# Patient Record
Sex: MALE | Race: ASIAN | NOT HISPANIC OR LATINO | URBAN - METROPOLITAN AREA
[De-identification: names, ages, dates, MRNs, and addresses within clinical notes are randomized per-mention and may not be internally consistent; named-entity substitution may affect disease eponyms.]

---

## 2019-03-08 ENCOUNTER — EMERGENCY (EMERGENCY)
Facility: HOSPITAL | Age: 64
LOS: 1 days | Discharge: HOME | End: 2019-03-08
Attending: EMERGENCY MEDICINE
Payer: COMMERCIAL

## 2019-03-08 VITALS
HEART RATE: 66 BPM | DIASTOLIC BLOOD PRESSURE: 78 MMHG | TEMPERATURE: 98 F | SYSTOLIC BLOOD PRESSURE: 131 MMHG | RESPIRATION RATE: 18 BRPM | OXYGEN SATURATION: 99 %

## 2019-03-08 VITALS
SYSTOLIC BLOOD PRESSURE: 128 MMHG | DIASTOLIC BLOOD PRESSURE: 84 MMHG | HEART RATE: 61 BPM | WEIGHT: 132.06 LBS | RESPIRATION RATE: 20 BRPM | OXYGEN SATURATION: 99 % | HEIGHT: 68 IN | TEMPERATURE: 98 F

## 2019-03-08 PROCEDURE — 99284 EMERGENCY DEPT VISIT MOD MDM: CPT | Mod: 25

## 2019-03-08 PROCEDURE — 99283 EMERGENCY DEPT VISIT LOW MDM: CPT

## 2019-03-08 PROCEDURE — 71046 X-RAY EXAM CHEST 2 VIEWS: CPT | Mod: 26

## 2019-03-08 PROCEDURE — 71046 X-RAY EXAM CHEST 2 VIEWS: CPT

## 2019-03-08 RX ORDER — LIDOCAINE 4 G/100G
1 CREAM TOPICAL ONCE
Qty: 0 | Refills: 0 | Status: COMPLETED | OUTPATIENT
Start: 2019-03-08 | End: 2019-03-08

## 2019-03-08 RX ORDER — ACETAMINOPHEN 500 MG
975 TABLET ORAL ONCE
Qty: 0 | Refills: 0 | Status: COMPLETED | OUTPATIENT
Start: 2019-03-08 | End: 2019-03-08

## 2019-03-08 RX ORDER — IBUPROFEN 200 MG
600 TABLET ORAL ONCE
Qty: 0 | Refills: 0 | Status: COMPLETED | OUTPATIENT
Start: 2019-03-08 | End: 2019-03-08

## 2019-03-08 RX ADMIN — Medication 600 MILLIGRAM(S): at 11:20

## 2019-03-08 RX ADMIN — Medication 600 MILLIGRAM(S): at 11:19

## 2019-03-08 RX ADMIN — LIDOCAINE 1 PATCH: 4 CREAM TOPICAL at 11:19

## 2019-03-08 RX ADMIN — Medication 975 MILLIGRAM(S): at 11:19

## 2019-03-08 RX ADMIN — Medication 975 MILLIGRAM(S): at 11:20

## 2019-03-08 NOTE — ED ADULT NURSE REASSESSMENT NOTE - NS ED NURSE REASSESS COMMENT FT1
Pt received from JAMAL Huddleston in [place]. Pt AOx3 breathing even unlabored spontaneously, NAD ambulating without difficulty.

## 2019-03-08 NOTE — ED PROVIDER NOTE - OBJECTIVE STATEMENT
Seen in FT5, accompanied by son, declined telephone      Attending MD Valencia: 63M with PMH/PSH including HTN presents to the ED with   back pain after falling last night.  Rosemarie was home last night around 8pm.  Rosemarie was walking down steps carrying a pen and paper because was going to record the gas meter.  Rosemarie was wearing slippers, slipped and missed the last three steps.  Rosemarie slipped and fell hitting the R side of his chest on the steps.  Denies preceding dizziness, weakness, sensory changes.  Denies LOC, hitting head.  Denies chest pain, shortness of breath, palpitations. Denies abdominal pain, nausea, vomiting, diarrhea, blood in stools. Denies dysuria, hematuria.  Reports last night took 2 Tylenol, not improved.  On exam, NAD, head NCAT, PERRL, FROM at neck, no tenderness to midline palpation, no stepoffs along length of spine, lungs CTAB with good inspiratory effort, +tenderness to palpation at around T6 extending from R midaxillary line to the lateral aspect of the back, no overlying ecchymosis, no crepitus, no breaks in skin, +S1S2, no m/r/g, abdomen soft with +BS, NT, ND, no CVAT, moving all extremities with 5/5 strength bilateral upper and lower extremities, good and equal  strength bilaterally; A/P: 63M with R back pain s/p fall, will give pain control, CXR to rule out fractures

## 2019-03-08 NOTE — ED PROVIDER NOTE - IMAGING STUDIES ADDITIONAL INFORMATION FREE TEXT
Discussed with first year resident, understand they cannot give prelim read, on my prelim, no acute pathology

## 2019-03-08 NOTE — ED PROVIDER NOTE - NSFOLLOWUPINSTRUCTIONS_ED_ALL_ED_FT
- You were given 975mg of Tylenol and 600mg of Motrin in the ED as well as a lidocaine patch applied.  You may continue to take over the counter pain medication as per package instructions.  Do not take more than 3000mg of Tylenol in a 24hr period.  - Return to your primary care doctor in 1-3 days.  - Return to the ED for new or worsening symptoms.  - Your chest xray was preliminarily negative, if there is a finding when it is read by  an attending radiology physician, they will contact you at the number you provided.

## 2019-03-08 NOTE — ED PROVIDER NOTE - PROGRESS NOTE DETAILS
Attending MD Valencia: Patient re-evaluated and no acute issues at  this time.  Radiology test reviewed with patient and family.  Patient stable for discharge. Follow up instructions given, importance of follow up emphasized, return to ED parameters reviewed and patient verbalized understanding.  All questions answered, all concerns addressed.

## 2024-09-19 ENCOUNTER — INPATIENT (INPATIENT)
Facility: HOSPITAL | Age: 69
LOS: 3 days | Discharge: NURSING, REHAB OR LONG-TERM CARE | DRG: 66 | End: 2024-09-23
Attending: PSYCHIATRY & NEUROLOGY | Admitting: PSYCHIATRY & NEUROLOGY
Payer: MEDICARE

## 2024-09-19 VITALS
DIASTOLIC BLOOD PRESSURE: 80 MMHG | HEART RATE: 57 BPM | WEIGHT: 131.18 LBS | SYSTOLIC BLOOD PRESSURE: 160 MMHG | OXYGEN SATURATION: 97 % | RESPIRATION RATE: 20 BRPM

## 2024-09-19 DIAGNOSIS — I63.9 CEREBRAL INFARCTION, UNSPECIFIED: ICD-10-CM

## 2024-09-19 LAB
ALBUMIN SERPL ELPH-MCNC: 4.3 G/DL — SIGNIFICANT CHANGE UP (ref 3.3–5)
ALP SERPL-CCNC: 95 U/L — SIGNIFICANT CHANGE UP (ref 40–120)
ALT FLD-CCNC: 75 U/L — HIGH (ref 10–45)
ANION GAP SERPL CALC-SCNC: 18 MMOL/L — HIGH (ref 5–17)
APTT BLD: 35.4 SEC — SIGNIFICANT CHANGE UP (ref 24.5–35.6)
AST SERPL-CCNC: 38 U/L — SIGNIFICANT CHANGE UP (ref 10–40)
BASOPHILS # BLD AUTO: 0.04 K/UL — SIGNIFICANT CHANGE UP (ref 0–0.2)
BASOPHILS NFR BLD AUTO: 0.7 % — SIGNIFICANT CHANGE UP (ref 0–2)
BILIRUB SERPL-MCNC: 0.7 MG/DL — SIGNIFICANT CHANGE UP (ref 0.2–1.2)
BUN SERPL-MCNC: 9 MG/DL — SIGNIFICANT CHANGE UP (ref 7–23)
CALCIUM SERPL-MCNC: 8.9 MG/DL — SIGNIFICANT CHANGE UP (ref 8.4–10.5)
CHLORIDE SERPL-SCNC: 104 MMOL/L — SIGNIFICANT CHANGE UP (ref 96–108)
CO2 SERPL-SCNC: 21 MMOL/L — LOW (ref 22–31)
CREAT SERPL-MCNC: 0.87 MG/DL — SIGNIFICANT CHANGE UP (ref 0.5–1.3)
EGFR: 92 ML/MIN/1.73M2 — SIGNIFICANT CHANGE UP
EOSINOPHIL # BLD AUTO: 0.04 K/UL — SIGNIFICANT CHANGE UP (ref 0–0.5)
EOSINOPHIL NFR BLD AUTO: 0.7 % — SIGNIFICANT CHANGE UP (ref 0–6)
GLUCOSE SERPL-MCNC: 127 MG/DL — HIGH (ref 70–99)
HCT VFR BLD CALC: 49 % — SIGNIFICANT CHANGE UP (ref 39–50)
HGB BLD-MCNC: 15.4 G/DL — SIGNIFICANT CHANGE UP (ref 13–17)
IMM GRANULOCYTES NFR BLD AUTO: 0.2 % — SIGNIFICANT CHANGE UP (ref 0–0.9)
INR BLD: 1.04 RATIO — SIGNIFICANT CHANGE UP (ref 0.85–1.18)
LYMPHOCYTES # BLD AUTO: 1.61 K/UL — SIGNIFICANT CHANGE UP (ref 1–3.3)
LYMPHOCYTES # BLD AUTO: 27.3 % — SIGNIFICANT CHANGE UP (ref 13–44)
MCHC RBC-ENTMCNC: 28.3 PG — SIGNIFICANT CHANGE UP (ref 27–34)
MCHC RBC-ENTMCNC: 31.4 GM/DL — LOW (ref 32–36)
MCV RBC AUTO: 89.9 FL — SIGNIFICANT CHANGE UP (ref 80–100)
MONOCYTES # BLD AUTO: 0.33 K/UL — SIGNIFICANT CHANGE UP (ref 0–0.9)
MONOCYTES NFR BLD AUTO: 5.6 % — SIGNIFICANT CHANGE UP (ref 2–14)
NEUTROPHILS # BLD AUTO: 3.86 K/UL — SIGNIFICANT CHANGE UP (ref 1.8–7.4)
NEUTROPHILS NFR BLD AUTO: 65.5 % — SIGNIFICANT CHANGE UP (ref 43–77)
NRBC # BLD: 0 /100 WBCS — SIGNIFICANT CHANGE UP (ref 0–0)
PLATELET # BLD AUTO: 235 K/UL — SIGNIFICANT CHANGE UP (ref 150–400)
POTASSIUM SERPL-MCNC: 3.7 MMOL/L — SIGNIFICANT CHANGE UP (ref 3.5–5.3)
POTASSIUM SERPL-SCNC: 3.7 MMOL/L — SIGNIFICANT CHANGE UP (ref 3.5–5.3)
PROT SERPL-MCNC: 7.8 G/DL — SIGNIFICANT CHANGE UP (ref 6–8.3)
PROTHROM AB SERPL-ACNC: 11.4 SEC — SIGNIFICANT CHANGE UP (ref 9.5–13)
RBC # BLD: 5.45 M/UL — SIGNIFICANT CHANGE UP (ref 4.2–5.8)
RBC # FLD: 13.2 % — SIGNIFICANT CHANGE UP (ref 10.3–14.5)
SODIUM SERPL-SCNC: 143 MMOL/L — SIGNIFICANT CHANGE UP (ref 135–145)
TROPONIN T, HIGH SENSITIVITY RESULT: 6 NG/L — SIGNIFICANT CHANGE UP (ref 0–51)
WBC # BLD: 5.89 K/UL — SIGNIFICANT CHANGE UP (ref 3.8–10.5)
WBC # FLD AUTO: 5.89 K/UL — SIGNIFICANT CHANGE UP (ref 3.8–10.5)

## 2024-09-19 PROCEDURE — 99285 EMERGENCY DEPT VISIT HI MDM: CPT | Mod: GC

## 2024-09-19 PROCEDURE — 70498 CT ANGIOGRAPHY NECK: CPT | Mod: 26,MC

## 2024-09-19 PROCEDURE — 0042T: CPT | Mod: MC

## 2024-09-19 PROCEDURE — 70496 CT ANGIOGRAPHY HEAD: CPT | Mod: 26,MC

## 2024-09-19 PROCEDURE — 93010 ELECTROCARDIOGRAM REPORT: CPT

## 2024-09-19 RX ORDER — LOSARTAN POTASSIUM 100 MG/1
100 TABLET, FILM COATED ORAL DAILY
Refills: 0 | Status: DISCONTINUED | OUTPATIENT
Start: 2024-09-21 | End: 2024-09-23

## 2024-09-19 RX ORDER — ENOXAPARIN SODIUM 150 MG/ML
40 INJECTION SUBCUTANEOUS EVERY 24 HOURS
Refills: 0 | Status: DISCONTINUED | OUTPATIENT
Start: 2024-09-19 | End: 2024-09-23

## 2024-09-19 RX ORDER — ASPIRIN 325 MG
300 TABLET ORAL DAILY
Refills: 0 | Status: DISCONTINUED | OUTPATIENT
Start: 2024-09-19 | End: 2024-09-20

## 2024-09-19 RX ORDER — LOSARTAN POTASSIUM 100 MG/1
100 TABLET, FILM COATED ORAL
Refills: 0 | Status: DISCONTINUED | OUTPATIENT
Start: 2024-09-19 | End: 2024-09-19

## 2024-09-19 RX ORDER — PANTOPRAZOLE SODIUM 40 MG/1
40 TABLET, DELAYED RELEASE ORAL
Refills: 0 | Status: DISCONTINUED | OUTPATIENT
Start: 2024-09-19 | End: 2024-09-23

## 2024-09-19 RX ORDER — ATORVASTATIN CALCIUM 10 MG/1
80 TABLET, FILM COATED ORAL AT BEDTIME
Refills: 0 | Status: DISCONTINUED | OUTPATIENT
Start: 2024-09-19 | End: 2024-09-23

## 2024-09-19 RX ADMIN — ATORVASTATIN CALCIUM 80 MILLIGRAM(S): 10 TABLET, FILM COATED ORAL at 21:38

## 2024-09-19 NOTE — H&P ADULT - HISTORY OF PRESENT ILLNESS
Neurology - Consult Note    Spectra: [phone] ([facility]), [phone] ([facility])    HPI: 70 y/o male R handed with a PMH of HTN, HLD presents with acute onset gait difficulties, slurred speech, diplopia. LKN is 0700 am 9/18. Per patients family he was at his baseline yesterday when he dropped his daughter wo the train station in the morning but then later around noon time when he went to get l;unch with his wife he was experiencing some gait difficulties. Patient also reports intermittent dizziness withot       Review of Systems:  INCOMPLETE   CONSTITUTIONAL: No fevers or chills  EYES AND ENT: No visual changes or no throat pain   NECK: No pain or stiffness  RESPIRATORY: No shortness of breath  CARDIOVASCULAR: No chest pain or palpitations  GASTROINTESTINAL: No nausea or vomiting   GENITOURINARY: No dysuria  NEUROLOGICAL: +As stated in HPI above  SKIN: No itching, burning, rashes, or lesions   All other review of systems is negative unless indicated above.    Allergies:  dust (Sneezing)  No Known Drug Allergies  grass (Sneezing)      PMHx/PSHx/Family Hx: As above, otherwise see below   Hypertriglyceridemia    HTN (hypertension)    Fracture        Social Hx:  Never smoker; no current use of tobacco, alcohol, or illicit drugs  Lives with ***; occupation ***, baseline functional status is ***    Medications:  MEDICATIONS  (STANDING):    MEDICATIONS  (PRN):      Vitals:  T(C): 37.4 (09-19-24 @ 14:55), Max: 37.4 (09-19-24 @ 14:55)  HR: 64 (09-19-24 @ 14:55) (57 - 64)  BP: 163/86 (09-19-24 @ 14:55) (160/80 - 163/86)  RR: 18 (09-19-24 @ 14:55) (18 - 20)  SpO2: 99% (09-19-24 @ 14:55) (97% - 99%)    Physical Examination: INCOMPLETE  General - non-toxic appearing male/female in no acute distress  Cardiovascular - peripheral pulses palpable, no edema  Respiratory - breathing comfortably with no increased work of breathing    Neurologic Exam:  Mental status - Awake, Alert, Oriented to person, place, and time. Speech fluent, repetition and naming intact. Follows simple and complex commands. Attention/concentration, recent and remote memory (including registration 3/3 and recall 3/3), and fund of knowledge intact    Cranial nerves - PERRLA (4mm -> 3mm b/l), VFF, EOMI, face sensation (V1-V3) intact b/l, facial strength intact without asymmetry b/l, hearing intact b/l, palate with symmetric elevation, trapezius OR sternocleidomastiod 5/5 strength b/l, tongue midline on protrusion with full lateral movement    Motor - Normal bulk and tone throughout. No pronator drift.    Strength testing            Deltoid      Biceps      Triceps     Wrist Extension    Wrist Flexion     Interossei         R            5                 5               5                     5                              5                        5                 5  L             5                 5               5                     5                              5                        5                 5              Hip Flexion    Hip Extension    Knee Flexion    Knee Extension    Dorsiflexion    Plantar Flexion  R              5                         5                       5                           5                            5                          5  L              5                         5                        5                           5                            5                          5    Sensation - Light touch/temperature OR pain/vibration intact throughout    DTR's -             Biceps      Triceps     Brachioradialis      Patellar    Ankle    Toes/plantar response  R             2+             2+                  2+                       2+            2+                 Down  L              2+             2+                 2+                        2+           2+                 Down    Coordination - Finger to Nose intact b/l. No tremors appreciated    Gait and station - Normal casual gait. Romberg (-)    Labs:                        15.4   5.89  )-----------( 235      ( 19 Sep 2024 14:39 )             49.0     09-19    143  |  104  |  9   ----------------------------<  127[H]  3.7   |  21[L]  |  0.87    Ca    8.9      19 Sep 2024 14:39    TPro  7.8  /  Alb  4.3  /  TBili  0.7  /  DBili  x   /  AST  38  /  ALT  75[H]  /  AlkPhos  95  09-19    CAPILLARY BLOOD GLUCOSE      POCT Blood Glucose.: 99 mg/dL (19 Sep 2024 14:33)    LIVER FUNCTIONS - ( 19 Sep 2024 14:39 )  Alb: 4.3 g/dL / Pro: 7.8 g/dL / ALK PHOS: 95 U/L / ALT: 75 U/L / AST: 38 U/L / GGT: x             PT/INR - ( 19 Sep 2024 14:39 )   PT: 11.4 sec;   INR: 1.04 ratio         PTT - ( 19 Sep 2024 14:39 )  PTT:35.4 sec  CSF:                  Radiology:     Neurology - Consult Note    Spectra: [phone] ([facility]), [phone] ([facility])    HPI: 68 y/o male R handed with a PMH of HTN, HLD presents with acute onset gait difficulties, slurred speech, diplopia. LKN is 0700 am 9/18. Per patients family he was at his baseline yesterday when he dropped his daughter at the train station in the morning but then later around noon time when he went to get lunch with his wife he was experiencing some gait difficulties. She noticed him limping to the L side. During the day the patient stayed in bed mostly and did not notice gait difficulties getting worse as he was mostly resting or sleeping. At 2 AM 9/19 he was going to the bathroom and was feeling very imbalanced and unable to walk unassisted.  Patient also reports intermittent dizziness where he felt like he was spinning around. He did not have any nausea or vomiting. This morning his daughter noted that he was unable to use his phone with the L hand and was unable to walk unassisted, requiring daughter or EMS to help out. He is not on any AC/AP at home. Patient is able to normally walk by himself does not need a cane or a walker. 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 While in the ED patient initially had some sensory deficit LLE which later improved on subsequent exam. Denies a headache at this time.     Denies a prior history of stroke. Denies dysphagia. States he was a smoker for about 5 years 40 years ago but nothing recently. Does not drink alcohol. Denies illicit drug use.       Review of Systems:   CONSTITUTIONAL: No fevers or chills  EYES AND ENT: No visual changes or no throat pain   NECK: No pain or stiffness  RESPIRATORY: No shortness of breath  CARDIOVASCULAR: No chest pain or palpitations  GASTROINTESTINAL: No nausea or vomiting   GENITOURINARY: No dysuria  NEUROLOGICAL: +As stated in HPI above  SKIN: No itching, burning, rashes, or lesions   All other review of systems is negative unless indicated above.    Allergies:  dust (Sneezing)  No Known Drug Allergies  grass (Sneezing)      PMHx/PSHx/Family Hx: As above, otherwise see below   Hypertriglyceridemia    HTN (hypertension)    Fracture        Social Hx:  Never smoker; no current use of tobacco, alcohol, or illicit drugs      Medications:  MEDICATIONS  (STANDING):    MEDICATIONS  (PRN):

## 2024-09-19 NOTE — ED PROVIDER NOTE - PROGRESS NOTE DETAILS
Van Graham, PGY2    70 yo M w PMHx of HTN HLD not on AC presents as code stroke for L sided facial weakness, slurred speech, L sided arm/leg weakness. LKW 1pm yesterday, neuro resident at bedside evaluating.

## 2024-09-19 NOTE — H&P ADULT - NSHPPHYSICALEXAM_GEN_ALL_CORE
Vitals:  T(C): 37.4 (09-19-24 @ 14:55), Max: 37.4 (09-19-24 @ 14:55)  HR: 64 (09-19-24 @ 14:55) (57 - 64)  BP: 163/86 (09-19-24 @ 14:55) (160/80 - 163/86)  RR: 18 (09-19-24 @ 14:55) (18 - 20)  SpO2: 99% (09-19-24 @ 14:55) (97% - 99%)    Physical Examination:   General - non-toxic appearing male in no acute distress  Cardiovascular - peripheral pulses palpable, no edema  Respiratory - breathing comfortably with no increased work of breathing    Neurologic Exam:  Mental status - Awake, Alert, Oriented to person, place, and time. Speech fluent, repetition and naming intact. Follows simple and complex commands. Attention/concentration, recent and remote memory (including registration 3/3 and recall 3/3), and fund of knowledge intact. Mild to moderate dysarthria.     Cranial nerves - PERRLA (4mm -> 3mm b/l), VFF, EOMI, face sensation (V1-V3) intact b/l,LFacial droop hearing intact b/l, palate with symmetric elevation, tongue midline on protrusion with full lateral movement    Motor - Normal bulk and tone throughout. +drift LUE, LLE    Strength testing            Deltoid      Biceps      Triceps     Wrist Extension    Wrist Flexion     Interossei         R            5                 5               5                     5                              5                        5                 5  L             4                 4               4                     4                              4                        4                 4              Hip Flexion    Hip Extension    Knee Flexion    Knee Extension    Dorsiflexion    Plantar Flexion  R              5                         5                       5                           5                            5                          5  L              4                         4                        5                           5                            5                          5    Sensation - Light touch  intact throughout    DTR's -             Biceps      Triceps     Brachioradialis      Patellar    Ankle    Toes/plantar response  R             2+             2+                  2+                       2+            2+                 Down  L              2+             2+                 2+                        2+           2+                 Down    Coordination - Ataxic on L heel to shin, dystaxic on L FTN however not out of proportion to weakness    Gait and station - Not assessed

## 2024-09-19 NOTE — H&P ADULT - ATTENDING COMMENTS
I reviewed available diagnostic studies, and reviewed images personally. I agree with resident's history, exam, orders placed, and plan of care. Total care time spent, 75 min. This excludes any time spent on separate procedures or teaching. Medical issues needing to be addressed include: Cerebral infarction w/ cytotoxic cerebral edema, HTN, HLD, gait difficulty/imbalance, dysarthria, L hemiparesis, cerebral athero including L vert athero and stenosis. MRI shows L lateral CC infarct and R elba. ESUS, f up echo, check risk factors, will need ILR. Cont aspirin. Service provided on 9/20/2024.

## 2024-09-19 NOTE — H&P ADULT - NSHPLABSRESULTS_GEN_ALL_CORE
Labs:                        15.4   5.89  )-----------( 235      ( 19 Sep 2024 14:39 )             49.0     09-19    143  |  104  |  9   ----------------------------<  127[H]  3.7   |  21[L]  |  0.87    Ca    8.9      19 Sep 2024 14:39    TPro  7.8  /  Alb  4.3  /  TBili  0.7  /  DBili  x   /  AST  38  /  ALT  75[H]  /  AlkPhos  95  09-19    CAPILLARY BLOOD GLUCOSE      POCT Blood Glucose.: 99 mg/dL (19 Sep 2024 14:33)    LIVER FUNCTIONS - ( 19 Sep 2024 14:39 )  Alb: 4.3 g/dL / Pro: 7.8 g/dL / ALK PHOS: 95 U/L / ALT: 75 U/L / AST: 38 U/L / GGT: x             PT/INR - ( 19 Sep 2024 14:39 )   PT: 11.4 sec;   INR: 1.04 ratio         PTT - ( 19 Sep 2024 14:39 )  PTT:35.4 sec      Radiology:    IMPRESSION:    CT brain:  No hydrocephalus, acute intracranial hemorrhage, mass effect, or brain   edema.    CT brain perfusion:  No significant technical limitation.    Cerebral blood flow less than 30% = 0 mL.    Tmax greater than 6 seconds = 18 mL and involves the bilateral posterior   cerebellar hemispheres and posterior vermis.    Mismatch volume: 18 mL  Mismatch ratio: Infinite    CTA brain:  Left vertebral artery demonstrates calcified plaque and moderate short   segment stenosis at its mid segment. Normal flow-related enhancement   proximal and distal to this level.    Left PICA not well visualized.  No vascular aneurysm. No AVM.    Venous system is well opacified, no evidence for venous sinus or cortical   vein thrombosis.    CTA neck:  No flow-limiting stenosis, evidence for arterial dissection, or vascular   aneurysm.

## 2024-09-19 NOTE — ED PROVIDER NOTE - NSICDXPASTMEDICALHX_GEN_ALL_CORE_FT
PAST MEDICAL HISTORY:  Fracture olecrenon process    HTN (hypertension)     Hypertriglyceridemia

## 2024-09-19 NOTE — ED ADULT NURSE REASSESSMENT NOTE - NS ED NURSE REASSESS COMMENT FT1
Report received from JAMLA Ferreira. Pt received A&Ox4, vitals retaken and documented, NIHSS performed and documented, pt family member given MRI safety screening forms to complete . Bed locked and in lowest position, side rails raised, call bell within reach. Currently pending Neuro bed.

## 2024-09-19 NOTE — ED PROVIDER NOTE - PHYSICAL EXAMINATION
Gen: No acute distress  HEENT: Mucous membranes moist, pink conjunctivae, EOMI  CV: RRR, no clubbing/cyanosis/edema  Resp: CTAB  GI: Abdomen soft, NT, ND. Normal BS. No rebound, no guarding  : No CVAT  Neuro: A&O x 3, left facial droop, left upper extremity left lower extremity 4 out of 5 strength.  MSK: No spine or joint tenderness to palpation  Skin: No rashes

## 2024-09-19 NOTE — ED ADULT NURSE NOTE - NSFALLRISKINTERV_ED_ALL_ED

## 2024-09-19 NOTE — ED PROVIDER NOTE - OBJECTIVE STATEMENT
Pt seen immediately on arrival at 14:32 by Dr. Graham in ambulance bay. Pt seen immediately on arrival at 14:32 by Dr. Graham in ambulance bay.    Dr. Crespo: 69-year-old male history of hypertension, hyperlipidemia, possible diabetes, here as a code stroke.  After detailed discussion with daughter who arrived shortly after to the ED patient's left lower extremity weakness started yesterday at 1 PM, patient shurgged it off, continue to worsen, and this morning noticed left upper extremity weakness and left facial droop as well.  Code stroke initiated in triage, neuro at bedside upon arrival.  No fevers, chills, chest pain, shortness breath, nausea, vomiting, abdominal pain, head injury.

## 2024-09-19 NOTE — H&P ADULT - ASSESSMENT
68 y/o male R handed with a PMH of HTN, HLD presents with acute onset gait difficulties, slurred speech, diplopia. LKN is 0700 am 9/18. 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 Diffuse intracranial atherosclerotic disease seen.         LKN: 0700 9/18  PreMRS: 0  NIHSS: 5 (FD, LUE drift, LLE drift, dysarthria, LLE dystaxia)      Not a tenecteplase candidate as patient is outside of the appropriate window  Not a thrombectomy candidate as no LVO is seen        Recommendations:  [] Admit to stroke floor  [] MRI brain wo contrast  [] TTE without bubble study  [] Start ASA 81mg daily  []  Atorvastatin 80mg QHS, (goal LDL<70)  [] DVT prophylaxis with lovenox 40 mg daily  [] Check HbA1C and lipid panel  [] Telemonitoring; Neurochecks and vital signs Q4H  [] Permissive HTN up to 220/120 mmHg for first 24 hours after the symptom onset followed by gradual normotension.   [] BGM goals 140-180  [] PT/OT evaluation  [] NPO until clears dysphagia screen, otherwise swallow evaluation  [] Stroke education provided      Case d/w Stroke fellow Dr Mcneal under the supervision of Dr Jerry   68 y/o male R handed with a PMH of HTN, HLD presents with acute onset gait difficulties, slurred speech, diplopia. LKN is 0700 am 9/18. 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 Diffuse intracranial atherosclerotic disease seen.         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         Recommendations:  [] Admit to stroke floor  [] MRI brain wo contrast  [] TTE without bubble study  [] Start ASA 81mg daily  []  Atorvastatin 80mg QHS, (goal LDL<70)  [] DVT prophylaxis with lovenox 40 mg daily  [] Check HbA1C and lipid panel  [] Telemonitoring; Neurochecks and vital signs Q4H  [] Permissive HTN up to 220/120 mmHg for first 24 hours after the symptom onset followed by gradual normotension.   [] BGM goals 140-180  [] PT/OT evaluation  [] NPO until clears dysphagia screen, otherwise swallow evaluation  [] Stroke education provided      Case d/w Stroke fellow Dr Mcneal under the supervision of Dr Jerry

## 2024-09-19 NOTE — ED PROVIDER NOTE - CLINICAL SUMMARY MEDICAL DECISION MAKING FREE TEXT BOX
Dr. Crespo: Patient presenting with likely subacute CVA, out of the window for tenecteplase, likely just out of the window for thrombectomy, will discuss with neurology.

## 2024-09-19 NOTE — ED PROVIDER NOTE - ATTENDING CONTRIBUTION TO CARE
Dr. Crespo: I have personally performed a face to face bedside history and physical examination of this patient. I have discussed the history, examination, review of systems, assessment and plan of management with the resident. I have reviewed the electronic medical record and amended it to reflect my history, review of systems, physical exam, assessment and plan.    Dr. Crespo: This H&P has been written by myself in its entirety

## 2024-09-19 NOTE — ED ADULT NURSE NOTE - OBJECTIVE STATEMENT
69y M A&O x3 BIBEMS from home, wife and daughter bedside. Presenting to the ER with left sided upper and lower extremity weakness, left sided facial droop, dysarthria and decreased sensation on the left side. Pt left lower extremity weakness began yesterday at 1 PM; pt did not pa weakness any mind and it continued to worsen, and this morning noticed left upper extremity weakness and left facial droop as well. Code stroke initiated on arrival at 1428.

## 2024-09-20 ENCOUNTER — RESULT REVIEW (OUTPATIENT)
Age: 69
End: 2024-09-20

## 2024-09-20 DIAGNOSIS — I63.9 CEREBRAL INFARCTION, UNSPECIFIED: ICD-10-CM

## 2024-09-20 DIAGNOSIS — E78.5 HYPERLIPIDEMIA, UNSPECIFIED: ICD-10-CM

## 2024-09-20 DIAGNOSIS — I10 ESSENTIAL (PRIMARY) HYPERTENSION: ICD-10-CM

## 2024-09-20 LAB
A1C WITH ESTIMATED AVERAGE GLUCOSE RESULT: 5.8 % — HIGH (ref 4–5.6)
ANION GAP SERPL CALC-SCNC: 12 MMOL/L — SIGNIFICANT CHANGE UP (ref 5–17)
BUN SERPL-MCNC: 14 MG/DL — SIGNIFICANT CHANGE UP (ref 7–23)
CALCIUM SERPL-MCNC: 9 MG/DL — SIGNIFICANT CHANGE UP (ref 8.4–10.5)
CHLORIDE SERPL-SCNC: 107 MMOL/L — SIGNIFICANT CHANGE UP (ref 96–108)
CHOLEST SERPL-MCNC: 175 MG/DL — SIGNIFICANT CHANGE UP
CO2 SERPL-SCNC: 23 MMOL/L — SIGNIFICANT CHANGE UP (ref 22–31)
CREAT SERPL-MCNC: 0.96 MG/DL — SIGNIFICANT CHANGE UP (ref 0.5–1.3)
EGFR: 84 ML/MIN/1.73M2 — SIGNIFICANT CHANGE UP
ESTIMATED AVERAGE GLUCOSE: 120 MG/DL — HIGH (ref 68–114)
GLUCOSE SERPL-MCNC: 136 MG/DL — HIGH (ref 70–99)
HCT VFR BLD CALC: 45.4 % — SIGNIFICANT CHANGE UP (ref 39–50)
HDLC SERPL-MCNC: 33 MG/DL — LOW
HGB BLD-MCNC: 14.4 G/DL — SIGNIFICANT CHANGE UP (ref 13–17)
LIPID PNL WITH DIRECT LDL SERPL: 101 MG/DL — HIGH
MCHC RBC-ENTMCNC: 27.3 PG — SIGNIFICANT CHANGE UP (ref 27–34)
MCHC RBC-ENTMCNC: 31.7 GM/DL — LOW (ref 32–36)
MCV RBC AUTO: 86 FL — SIGNIFICANT CHANGE UP (ref 80–100)
NON HDL CHOLESTEROL: 142 MG/DL — HIGH
NRBC # BLD: 0 /100 WBCS — SIGNIFICANT CHANGE UP (ref 0–0)
PLATELET # BLD AUTO: 242 K/UL — SIGNIFICANT CHANGE UP (ref 150–400)
POTASSIUM SERPL-MCNC: 3.6 MMOL/L — SIGNIFICANT CHANGE UP (ref 3.5–5.3)
POTASSIUM SERPL-SCNC: 3.6 MMOL/L — SIGNIFICANT CHANGE UP (ref 3.5–5.3)
RBC # BLD: 5.28 M/UL — SIGNIFICANT CHANGE UP (ref 4.2–5.8)
RBC # FLD: 13.1 % — SIGNIFICANT CHANGE UP (ref 10.3–14.5)
SODIUM SERPL-SCNC: 142 MMOL/L — SIGNIFICANT CHANGE UP (ref 135–145)
TRIGL SERPL-MCNC: 240 MG/DL — HIGH
WBC # BLD: 7.33 K/UL — SIGNIFICANT CHANGE UP (ref 3.8–10.5)
WBC # FLD AUTO: 7.33 K/UL — SIGNIFICANT CHANGE UP (ref 3.8–10.5)

## 2024-09-20 PROCEDURE — 99223 1ST HOSP IP/OBS HIGH 75: CPT

## 2024-09-20 PROCEDURE — 93306 TTE W/DOPPLER COMPLETE: CPT | Mod: 26

## 2024-09-20 PROCEDURE — 99222 1ST HOSP IP/OBS MODERATE 55: CPT

## 2024-09-20 PROCEDURE — 70551 MRI BRAIN STEM W/O DYE: CPT | Mod: 26

## 2024-09-20 RX ORDER — ASPIRIN 325 MG
81 TABLET ORAL DAILY
Refills: 0 | Status: DISCONTINUED | OUTPATIENT
Start: 2024-09-20 | End: 2024-09-23

## 2024-09-20 RX ADMIN — Medication 81 MILLIGRAM(S): at 09:45

## 2024-09-20 RX ADMIN — ATORVASTATIN CALCIUM 80 MILLIGRAM(S): 10 TABLET, FILM COATED ORAL at 21:18

## 2024-09-20 RX ADMIN — PANTOPRAZOLE SODIUM 40 MILLIGRAM(S): 40 TABLET, DELAYED RELEASE ORAL at 05:03

## 2024-09-20 NOTE — SPEECH LANGUAGE PATHOLOGY EVALUATION - H & P REVIEW
68 y/o male R handed with a PMH of HTN, HLD presents with acute onset gait difficulties, slurred speech, diplopia. LKN is 0700 am 9/18. 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 Diffuse intracranial atherosclerotic disease seen./yes

## 2024-09-20 NOTE — OCCUPATIONAL THERAPY INITIAL EVALUATION ADULT - ADDITIONAL COMMENTS
Pt reports he lives with spouse in a Private house, [address] +HR, first floor set up. +stall shower, No DME/No AE Prior to admission pt independent  in all adl's and functional mobility

## 2024-09-20 NOTE — CONSULT NOTE ADULT - PROBLEM SELECTOR RECOMMENDATION 9
Acute/subacute infarction within the right side of the elba   as well as left lateral genu of the corpus callosum with associated   cytotoxic edema and without associated hemorrhage. Suspicious for embolic etiology  given different vascular distributions.  Check TTE   Monitor on Tele  A1c  Thyroid panel   ASA

## 2024-09-20 NOTE — CHART NOTE - NSCHARTNOTEFT_GEN_A_CORE
ASSESSMENT: 70 y/o male R handed with a PMH of HTN, HLD presents with acute onset gait difficulties, slurred speech, diplopia. LKN is 0700 am 9/18. 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 Diffuse intracranial atherosclerotic disease seen. On exam was found to have L ataxic hemiparesis and dysarthria. Exam remains stable.        NEURO: Continue close monitoring for neurologic deterioration, permissive HTN, titrate statin to LDL goal less than 70, MRI Brain w/o, MRA Head w/o and Neck w/contrast. Physical therapy/OT/Speech eval/treatment. MRI brain with R pontine and L callosal infarct. 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 Will need ILR before DC.     ANTITHROMBOTIC THERAPY: Aspirin 81 mg    PULMONARY: CXR clear, protecting airway, saturating well     CARDIOVASCULAR: check TTE, cardiac monitoring. ILR before DC. Can resume home losartan today.     SBP goal: <140/90    GASTROINTESTINAL:  dysphagia screen done, on pureed diet     Diet: Pureed    RENAL: BUN/Cr stable, good urine output      Na Goal: Greater than 135     [place]: No    HEMATOLOGY: H/H stable, Platelets normal      DVT ppx: Heparin s.c [X] LMWH []     ID: afebrile, no leukocytosis     OTHER:  SCDs and Lovenox 40 mg for DVT ppx    DISPOSITION: Rehab or home depending on PT eval once stable and workup is complete      CORE MEASURES:        Admission NIHSS:      TPA: [] YES [X] NO      LDL/HDL: 101     Depression Screen: Negative     Statin Therapy: Atorvastatin 80 mg     Dysphagia Screen: [] PASS [] FAIL     Smoking [] YES [X] NO      Afib [] YES [X] NO     Stroke Education [] YES [] NO

## 2024-09-20 NOTE — SPEECH LANGUAGE PATHOLOGY EVALUATION - SLP CRITERIA FOR SKILLED THERAPEUTIC INTERVENTIONS MET
skilled criteria for intervention met [Negative] : Respiratory [Fever] : no fever [Chills] : no chills

## 2024-09-20 NOTE — CONSULT NOTE ADULT - ASSESSMENT
68 y/o male R handed with a PMH of HTN, HLD presents with acute onset gait difficulties, slurred speech, diplopia. LKN is 0700 am 9/18. 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 Diffuse intracranial atherosclerotic disease seen.         LKN: 0700 9/18  PreMRS: 0  NIHSS: 5 (FD, LUE drift, LLE drift, dysarthria, LLE dystaxia)      Not a tenecteplase candidate as patient is outside of the appropriate window  Not a thrombectomy candidate as no LVO is seen

## 2024-09-20 NOTE — CONSULT NOTE ADULT - SUBJECTIVE AND OBJECTIVE BOX
CHIEF COMPLAINT:    HISTORY OF PRESENT ILLNESS:   68 y/o male R handed with a PMH of HTN, HLD presents with acute onset gait difficulties, slurred speech, diplopia. LKN is 0700 am 9/18. Per patients family he was at his baseline yesterday when he dropped his daughter at the train station in the morning but then later around noon time when he went to get lunch with his wife he was experiencing some gait difficulties. She noticed him limping to the L side. During the day the patient stayed in bed mostly and did not notice gait difficulties getting worse as he was mostly resting or sleeping. At 2 AM 9/19 he was going to the bathroom and was feeling very imbalanced and unable to walk unassisted.  Patient also reports intermittent dizziness where he felt like he was spinning around. He did not have any nausea or vomiting. This morning his daughter noted that he was unable to use his phone with the L hand and was unable to walk unassisted, requiring daughter or EMS to help out. He is not on any AC/AP at home. Patient is able to normally walk by himself does not need a cane or a walker. 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 While in the ED patient initially had some sensory deficit LLE which later improved on subsequent exam. Denies a headache at this time.     Denies a prior history of stroke. Denies dysphagia. States he was a smoker for about 5 years 40 years ago but nothing recently. Does not drink alcohol. Denies illicit drug use.             PAST MEDICAL & SURGICAL HISTORY:  Hypertriglyceridemia      HTN (hypertension)      Fracture  olecrenon process      S/P inguinal hernia repair  bilateral              MEDICATIONS:  aspirin  chewable 81 milliGRAM(s) Oral daily  enoxaparin Injectable 40 milliGRAM(s) SubCutaneous every 24 hours          pantoprazole    Tablet 40 milliGRAM(s) Oral before breakfast    atorvastatin 80 milliGRAM(s) Oral at bedtime        FAMILY HISTORY:      SOCIAL HISTORY:    [ ] Non-smoker  [ ] Smoker  [ ] Alcohol    Allergies    dust (Sneezing)  No Known Drug Allergies  grass (Sneezing)    Intolerances    	    REVIEW OF SYSTEMS:  CONSTITUTIONAL: No fever, weight loss, or fatigue  EYES: No eye pain, visual disturbances, or discharge  ENMT:  No difficulty hearing, tinnitus, vertigo; No sinus or throat pain  NECK: No pain or stiffness  RESPIRATORY: No cough, wheezing, chills or hemoptysis; No Shortness of Breath  CARDIOVASCULAR: No chest pain, palpitations, passing out, dizziness, or leg swelling  GASTROINTESTINAL: No abdominal or epigastric pain. No nausea, vomiting, or hematemesis; No diarrhea or constipation. No melena or hematochezia.  GENITOURINARY: No dysuria, frequency, hematuria, or incontinence  NEUROLOGICAL: No headaches, memory loss, loss of strength, numbness, or tremors  SKIN: No itching, burning, rashes, or lesions   LYMPH Nodes: No enlarged glands  ENDOCRINE: No heat or cold intolerance; No hair loss  MUSCULOSKELETAL: No joint pain or swelling; No muscle, back, or extremity pain  PSYCHIATRIC: No depression, anxiety, mood swings, or difficulty sleeping  HEME/LYMPH: No easy bruising, or bleeding gums  ALLERY AND IMMUNOLOGIC: No hives or eczema	    [ ] All others negative	  [ ] Unable to obtain    PHYSICAL EXAM:  T(C): 37.1 (09-20-24 @ 05:03), Max: 37.4 (09-19-24 @ 14:55)  HR: 70 (09-20-24 @ 05:03) (56 - 78)  BP: 127/76 (09-20-24 @ 05:03) (127/76 - 163/86)  RR: 18 (09-20-24 @ 05:03) (17 - 20)  SpO2: 98% (09-20-24 @ 05:03) (93% - 100%)  Wt(kg): --  I&O's Summary      Appearance: NAD  HEENT:   Dry  oral mucosa, PERRL, EOMI	  Lymphatic: No lymphadenopathy  Cardiovascular: Normal S1 S2, No JVD, No murmurs, No edema  Respiratory: Lungs clear to auscultation	  Psychiatry: A & O x 3, lethargic  Gastrointestinal:  Soft, Non-tender, + BS	  Skin: No rashes, No ecchymoses, No cyanosis	  Neurologic: Awake, Alert, Oriented to person, place, and time. Speech fluent, repetition and naming intact. Follows simple and complex commands. Attention/concentration, recent and remote memory (including registration 3/3 and recall 3/3), and fund of knowledge intact. Mild to moderate dysarthria.     Cranial nerves - PERRLA (4mm -> 3mm b/l), VFF, EOMI, face sensation (V1-V3) intact b/l,LFacial droop hearing intact b/l, palate with symmetric elevation, tongue midline on protrusion with full lateral movement    Motor - Normal bulk and tone throughout. +drift LUE, LLE    Strength testing            Deltoid      Biceps      Triceps     Wrist Extension    Wrist Flexion     Interossei         R            5                 5               5                     5                              5                        5                 5  L             4                 4               4                     4                              4                        4                 4              Hip Flexion    Hip Extension    Knee Flexion    Knee Extension    Dorsiflexion    Plantar Flexion  R              5                         5                       5                           5                            5                          5  L              4                         4                        5                           5                            5                          5    Sensation - Light touch  intact throughout    DTR's -             Biceps      Triceps     Brachioradialis      Patellar    Ankle    Toes/plantar response  R             2+             2+                  2+                       2+            2+                 Down  L              2+             2+                 2+                        2+           2+                 Down    Coordination - Ataxic on L heel to shin, dystaxic on L FTN however not out of proportion to weakness    Gait and station - Not assessed  Extremities: Normal range of motion, No clubbing, cyanosis or edema  Vascular: Peripheral pulses palpable 2+ bilaterally    TELEMETRY: 	 SR    ECG:  	   RADIOLOGY:  < from: CT Angio Neck Stroke Protocol w/ IV Cont (09.19.24 @ 14:58) >    ACC: [id number] EXAM:  CT BRAIN PERFUSION MAPS STROKE   ORDERED BY:  FRANSISCO GARCIA     ACC: [id number] EXAM:  CT ANGIO BRAIN STROKE PROTC IC   ORDERED BY:  FRANSISCO GARCIA     ACC: [id number] EXAM:  CT BRAIN STROKE PROTOCOL   ORDERED BY:  FRANSISCO GARCIA     ACC: [id number] EXAM:  CT ANGIO NECK STROKE PROTCL IC   ORDERED BY:  FRANSISCO GARCIA     PROCEDURE DATE:  09/19/2024          INTERPRETATION:  CT angiography of the brain and neck. CT brain perfusion.    CLINICAL INDICATION: Code stroke, dysarthria    TECHNIQUE: Direct axial CT scanning of the brain and neck was obtained   from the vertex to the level of the clavicular heads after the dynamic   intravenous injection of Omnipaque 300. Sagittal and coronal maximum   intensity projection reformats were provided.  Three-dimensional   reconstructions were performed by the radiologist using the [employer]   workstation.    CT perfusion was obtained, and post processed utilizing RAPID software.    Additionally, noncontrast axial CT scanning of the brain was obtained   from the skull base to the vertex. Sagittal and coronal reformats were   provided.    A total of 120 cc of Omnipaque 300 was intravenously administered for   this examination. 80 cc discarded.    COMPARISON: None available    FINDINGS:    CT BRAIN:    No hydrocephalus, mass effect, midline shift, acute intracranial   hemorrhage, or brain edema.    Mild white matter microvascular ischemic disease.    Visualized paranasal sinuses and mastoid air cells are clear.    CT BRAIN PERFUSION:    No significant technical limitation.    Cerebral blood flow less than 30% = 0 mL.    Tmax greater than 6 seconds = 18 mL and involves the bilateral posterior   cerebellar hemispheres and posterior vermis.    Mismatch volume: 18 mL  Mismatch ratio: Infinite    CTA BRAIN:    Normal flow-related enhancement of the skull base/intracranial internal   carotid arteries.    Normal flow-related enhancement of the bilateral anterior, middle, and   posterior cerebral arteries.    Normal flow-related enhancement of the right vertebral and basilar   arteries.    Left vertebral artery demonstrates calcified plaque and moderate short   segment stenosis at its mid segment. Normal flow-related enhancement   proximal and distal to this level.    The left PICA is not well visualized.    No vascular aneurysm. No AVM.    Venous system is well opacified, no evidence for venous sinus or cortical   vein thrombosis.    CTA NECK:    Normal flow-related enhancement of the bilateral common and internal   carotid arteries.    Normal flow-related enhancement of the bilateral vertebral arteries.    No flow-limiting stenosis, evidence for arterial dissection, or vascular   aneurysm.    Visualized lungs clear. Visualized soft tissues unremarkable.    IMPRESSION:    CT brain:  No hydrocephalus, acute intracranial hemorrhage, mass effect, or brain   edema.    CT brain perfusion:  No significant technical limitation.    Cerebral blood flow less than 30% = 0 mL.    Tmax greater than 6 seconds = 18 mL and involves the bilateral posterior   cerebellar hemispheres and posterior vermis.    Mismatch volume: 18 mL  Mismatch ratio: Infinite    CTA brain:  Left vertebral artery demonstrates calcified plaque and moderate short   segment stenosis at its mid segment. Normal flow-related enhancement   proximal and distal to this level.    Left PICA not well visualized.  No vascular aneurysm. No AVM.    Venous system is well opacified, no evidence for venous sinus or cortical   vein thrombosis.    CTA neck:  No flow-limiting stenosis, evidence for arterial dissection, or vascular   aneurysm.    Dr. Juarez discussed these findings with neurology consult team on   9/19/2024 2:45 PM with read back.    --- End of Report ---            RANDALL JUAREZ MD; Attending Radiologist  This document has been electronically signed. Sep 19 2024  3:45PM    < end of copied text >  < from: MR Head No Cont (09.20.24 @ 08:08) >    ACC: [id number] EXAM:  MR BRAIN   ORDERED BY:  ADALID CREWS     PROCEDURE DATE:  09/20/2024          INTERPRETATION:  .    CLINICAL INFORMATION: Left-sided weakness. Stroke.    TECHNIQUE: Multiplanar multisequential MRI of the brain was acquired   without the administration of IV gadolinium.    COMPARISON: Prior CT code stroke series dated 9/19/2024. No prior brain   MRI studies are available for comparison    FINDINGS: There is diffusion restriction as well as T2/FLAIR hyperintense   signal change within the right side of the elba. There is no associated   hemorrhage.    A small focus of diffusion resection as well as T2/FLAIR hyperintense   signal changes also seen within the left lateral genu of the corpus   callosum.    Multiple additional solid patchy confluent nonspecific T2/FLAIR   hyperintense signal changes are noted throughout the bihemispheric white   matter without associated mass effect or restricted diffusion.    Ventricular size and configuration is unremarkable. No abnormal extra   axial fluid collections are notable. Flow-voids are noted throughout the   major intracranial vessels, on the T2 weighted images, consistent with   their patency. The sellar location appears unremarkable.    The paranasal sinuses and tympanomastoid cavities are clear. The   calvarium appears intact. The orbits are unremarkable.    IMPRESSION: Acute/subacute infarction within the right side of the elba   as well as left lateral genu of the corpus callosum with associated   cytotoxic edema and without associated hemorrhage. Embolic etiology   should be considered given different vascular distributions.    --- End of Report ---    < end of copied text >    OTHER: 	  	  LABS:	 	    CARDIAC MARKERS:                                  14.4   7.33  )-----------( 242      ( 20 Sep 2024 06:28 )             45.4     09-20    142  |  107  |  14  ----------------------------<  136[H]  3.6   |  23  |  0.96    Ca    9.0      20 Sep 2024 06:28    TPro  7.8  /  Alb  4.3  /  TBili  0.7  /  DBili  x   /  AST  38  /  ALT  75[H]  /  AlkPhos  95  09-19    proBNP:   Lipid Profile:   HgA1c:   TSH:

## 2024-09-20 NOTE — PHYSICAL THERAPY INITIAL EVALUATION ADULT - FINE MOTOR COORDINATION, FINE MOTOR COORDINATION TESTS, OT EVAL
intact RUE finger to finger intact RUE finger to finger, RLE heel - shin intact, LLE heel-shin severe impairment

## 2024-09-20 NOTE — CONSULT NOTE ADULT - SUBJECTIVE AND OBJECTIVE BOX
Patient is a 69y old  Male who presents with a chief complaint of     Admission HPI: 68 y/o male R handed with a PMH of HTN, HLD presents with acute onset gait difficulties, slurred speech, diplopia. LKN is 0700 am 9/18. Per patients family he was at his baseline yesterday when he dropped his daughter at the train station in the morning but then later around noon time when he went to get lunch with his wife he was experiencing some gait difficulties. She noticed him limping to the L side. During the day the patient stayed in bed mostly and did not notice gait difficulties getting worse as he was mostly resting or sleeping. At 2 AM 9/19 he was going to the bathroom and was feeling very imbalanced and unable to walk unassisted.  Patient also reports intermittent dizziness where he felt like he was spinning around. He did not have any nausea or vomiting. This morning his daughter noted that he was unable to use his phone with the L hand and was unable to walk unassisted, requiring daughter or EMS to help out. He is not on any AC/AP at home. Patient is able to normally walk by himself does not need a cane or a walker. 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 While in the ED patient initially had some sensory deficit LLE which later improved on subsequent exam. Denies a headache at this time.     Denies a prior history of stroke. Denies dysphagia. States he was a smoker for about 5 years 40 years ago but nothing recently. Does not drink alcohol. Denies illicit drug use.     Interval History:  Patient had imaging:   MR Head No Cont (09.20.24 @ 08:08) >  Acute/subacute infarction within the right side of the elba   as well as left lateral genu of the corpus callosum with associated   cytotoxic edema and without associated hemorrhage. Embolic etiology   should be considered given different vascular distributions.    Patient with functional deficits.    REVIEW OF SYSTEMS: No chest pain, shortness of breath, nausea, vomiting or diarhea; other ROS neg     PAST MEDICAL & SURGICAL HISTORY  Hypertriglyceridemia  HTN (hypertension)  Fracture  S/P inguinal hernia repair    FUNCTIONAL HISTORY:   Lives w wife in home w MARILYNN and one flight  Independent    CURRENT FUNCTIONAL STATUS:  Min A transfers, mod A gait    FAMILY HISTORY   N/C    MEDICATIONS   aspirin  chewable 81 milliGRAM(s) Oral daily  atorvastatin 80 milliGRAM(s) Oral at bedtime  enoxaparin Injectable 40 milliGRAM(s) SubCutaneous every 24 hours  pantoprazole    Tablet 40 milliGRAM(s) Oral before breakfast    ALLERGIES  dust (Sneezing)  No Known Drug Allergies  grass (Sneezing)    VITALS  T(C): 36.7 (09-20-24 @ 08:30), Max: 37.4 (09-19-24 @ 14:55)  HR: 70 (09-20-24 @ 12:01) (56 - 78)  BP: 155/88 (09-20-24 @ 12:01) (127/76 - 163/86)  RR: 18 (09-20-24 @ 08:30) (17 - 20)  SpO2: 96% (09-20-24 @ 12:01) (93% - 100%)  Wt(kg): --    PHYSICAL EXAM  Constitutional - NAD, Comfortable  HEENT - NCAT, EOMI  Neck - Supple  Chest - No distress, no use of accessory muscles for respiration  Cardiovascular -Well perfused  Abdomen - BS+, Soft, NTND  Extremities - No C/C/E, No calf tenderness   Neurologic Exam -                    Cognitive - Awake, Alert, AAO to self, place, date, year, situation     Communication - Fluent, No dysarthria, no aphasia     Cranial Nerves - CN 2-12 intact     Motor - No focal deficits      Sensory - Intact to LT     Reflexes - DTR Intact, No primitive reflexive  Psychiatric - Mood stable, Affect WNL    RECENT LABS/IMAGING  CBC Full  -  ( 20 Sep 2024 06:28 )  WBC Count : 7.33 K/uL  RBC Count : 5.28 M/uL  Hemoglobin : 14.4 g/dL  Hematocrit : 45.4 %  Platelet Count - Automated : 242 K/uL  Mean Cell Volume : 86.0 fl  Mean Cell Hemoglobin : 27.3 pg  Mean Cell Hemoglobin Concentration : 31.7 gm/dL  Auto Neutrophil # : x  Auto Lymphocyte # : x  Auto Monocyte # : x  Auto Eosinophil # : x  Auto Basophil # : x  Auto Neutrophil % : x  Auto Lymphocyte % : x  Auto Monocyte % : x  Auto Eosinophil % : x  Auto Basophil % : x    09-20    142  |  107  |  14  ----------------------------<  136[H]  3.6   |  23  |  0.96    Ca    9.0      20 Sep 2024 06:28    TPro  7.8  /  Alb  4.3  /  TBili  0.7  /  DBili  x   /  AST  38  /  ALT  75[H]  /  AlkPhos  95  09-19    Urinalysis Basic - ( 20 Sep 2024 06:28 )    Color: x / Appearance: x / SG: x / pH: x  Gluc: 136 mg/dL / Ketone: x  / Bili: x / Urobili: x   Blood: x / Protein: x / Nitrite: x   Leuk Esterase: x / RBC: x / WBC x   Sq Epi: x / Non Sq Epi: x / Bacteria: x    Impression:  68 yo with functional deficits secondary to diagnosis of CVA    Plan:  PT- ROM, Bed Mob, Transfers, Amb w AD and bracing as needed  OT- ADLs, bracing  SLP- Dysphagia eval and treat  Prec- Falls, Cardiac  DVT Prophylaxis - Lovenox  Skin- Turn q2 h  Dispo-     Total time taken to review relevant records and imaging results, examine patient, write note, and, when applicable, discuss case with patient, family, , resident, medical student and other medical providers:     [  ] 40 minutes (09354)  [  ] 55 minutes (56364)  [  ] 75 minutes (41503)    [  ] 25 minutes (04948)  [  ] 35 minutes (79524)  [  ] 50 minutes (12703)           Patient is a 69y old  Male who presents with a chief complaint of     Admission HPI: 68 y/o male R handed with a PMH of HTN, HLD presents with acute onset gait difficulties, slurred speech, diplopia. LKN is 0700 am 9/18. Per patients family he was at his baseline yesterday when he dropped his daughter at the train station in the morning but then later around noon time when he went to get lunch with his wife he was experiencing some gait difficulties. She noticed him limping to the L side. During the day the patient stayed in bed mostly and did not notice gait difficulties getting worse as he was mostly resting or sleeping. At 2 AM 9/19 he was going to the bathroom and was feeling very imbalanced and unable to walk unassisted.  Patient also reports intermittent dizziness where he felt like he was spinning around. He did not have any nausea or vomiting. This morning his daughter noted that he was unable to use his phone with the L hand and was unable to walk unassisted, requiring daughter or EMS to help out. He is not on any AC/AP at home. Patient is able to normally walk by himself does not need a cane or a walker. 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 While in the ED patient initially had some sensory deficit LLE which later improved on subsequent exam. Denies a headache at this time.     Denies a prior history of stroke. Denies dysphagia. States he was a smoker for about 5 years 40 years ago but nothing recently. Does not drink alcohol. Denies illicit drug use.     Interval History:  Patient had imaging:   MR Head No Cont (09.20.24 @ 08:08) >  Acute/subacute infarction within the right side of the elba   as well as left lateral genu of the corpus callosum with associated   cytotoxic edema and without associated hemorrhage. Embolic etiology   should be considered given different vascular distributions.    Patient with functional deficits.    Patient's son at bedside.    REVIEW OF SYSTEMS: L sided weakness, poor balance, No chest pain, shortness of breath, nausea, vomiting or diarhea; other ROS neg     PAST MEDICAL & SURGICAL HISTORY  Hypertriglyceridemia  HTN (hypertension)  Fracture  S/P inguinal hernia repair    FUNCTIONAL HISTORY:   Lives w wife in home w MARILYNN and one flight  Independent    CURRENT FUNCTIONAL STATUS:  Min A transfers, mod A gait    FAMILY HISTORY   N/C    MEDICATIONS   aspirin  chewable 81 milliGRAM(s) Oral daily  atorvastatin 80 milliGRAM(s) Oral at bedtime  enoxaparin Injectable 40 milliGRAM(s) SubCutaneous every 24 hours  pantoprazole    Tablet 40 milliGRAM(s) Oral before breakfast    ALLERGIES  dust (Sneezing)  No Known Drug Allergies  grass (Sneezing)    VITALS  T(C): 36.7 (09-20-24 @ 08:30), Max: 37.4 (09-19-24 @ 14:55)  HR: 70 (09-20-24 @ 12:01) (56 - 78)  BP: 155/88 (09-20-24 @ 12:01) (127/76 - 163/86)  RR: 18 (09-20-24 @ 08:30) (17 - 20)  SpO2: 96% (09-20-24 @ 12:01) (93% - 100%)  Wt(kg): --    PHYSICAL EXAM  Constitutional - NAD, Comfortable  HEENT - NGT, NCAT, EOMI  Neck - Supple  Chest - No distress, no use of accessory muscles for respiration  Cardiovascular -Well perfused  Abdomen - BS+, Soft, NTND  Extremities - No C/C/E, No calf tenderness   Neurologic Exam -                 Alert  Speech intact  Motor LUE 1/5, LLE 4/5  No clonus  Sensation intact   Psychiatric - Mood stable, Affect WNL    RECENT LABS/IMAGING  CBC Full  -  ( 20 Sep 2024 06:28 )  WBC Count : 7.33 K/uL  RBC Count : 5.28 M/uL  Hemoglobin : 14.4 g/dL  Hematocrit : 45.4 %  Platelet Count - Automated : 242 K/uL  Mean Cell Volume : 86.0 fl  Mean Cell Hemoglobin : 27.3 pg  Mean Cell Hemoglobin Concentration : 31.7 gm/dL  Auto Neutrophil # : x  Auto Lymphocyte # : x  Auto Monocyte # : x  Auto Eosinophil # : x  Auto Basophil # : x  Auto Neutrophil % : x  Auto Lymphocyte % : x  Auto Monocyte % : x  Auto Eosinophil % : x  Auto Basophil % : x    09-20    142  |  107  |  14  ----------------------------<  136[H]  3.6   |  23  |  0.96    Ca    9.0      20 Sep 2024 06:28    TPro  7.8  /  Alb  4.3  /  TBili  0.7  /  DBili  x   /  AST  38  /  ALT  75[H]  /  AlkPhos  95  09-19    Urinalysis Basic - ( 20 Sep 2024 06:28 )    Color: x / Appearance: x / SG: x / pH: x  Gluc: 136 mg/dL / Ketone: x  / Bili: x / Urobili: x   Blood: x / Protein: x / Nitrite: x   Leuk Esterase: x / RBC: x / WBC x   Sq Epi: x / Non Sq Epi: x / Bacteria: x    Impression:  68 yo with functional deficits secondary to diagnosis of CVA    Plan:  PT- ROM, Bed Mob, Transfers, Amb w AD and bracing as needed  OT- ADLs, bracing  SLP- Dysphagia eval and treat  Prec- Falls, Cardiac  DVT Prophylaxis - Lovenox  Skin- Turn q2 h  Dispo- Acute Rehab- patient requires active and ongoing therapeutic interventions of multiple disciplines and can tolerate and benefit from 3 hours of intensive therapies x 2-4wks depending on progress at rehabilitation facility. Can actively participate and benefit from  an intensive rehabilitation program. Requires supervision by a rehabilitation physician and a coordinated interdisciplinary approach to providing rehabilitation.     Total time taken to review relevant records and imaging results, examine patient, write note, and, when applicable, discuss case with patient, family, , resident, medical student and other medical providers:     [  ] 40 minutes (02764)  [X] 55 minutes (16382)  [  ] 75 minutes (43128)    [  ] 25 minutes (14788)  [  ] 35 minutes (54731)  [  ] 50 minutes (64176)

## 2024-09-20 NOTE — SPEECH LANGUAGE PATHOLOGY EVALUATION - COMMENTS
Neurologic Exam:  Mental status - Awake, Alert, Oriented to person, place, and time. Speech fluent, repetition and naming intact. Follows simple and complex commands. Attention/concentration, recent and remote memory (including registration 3/3 and recall 3/3), and fund of knowledge intact. Mild to moderate dysarthria.    CTA brain:  Left vertebral artery demonstrates calcified plaque and moderate short   segment stenosis at its mid segment. Normal flow-related enhancement   proximal and distal to this level. Auditory comprehension skills grossly WFLs Verbal expression skills grossly WFLs Pt read short sentences with WFLs oral reading skills and demonstrated good comprehension by carrying out command in each sentence. Organization deficits marked by impairments in semantic fluency, indicating difficulties organizing words by category, and impairments on clock drawing task indicating difficulties with visuospatial skills given numbers written outside of clock [place]. Pt reported he generally writes in Mandarin only. Pt wrote his name, numbers, and single letters in English legibly. Per Mandarin-English , Amina [id number], pt does not p/w slurred speech and "sounds completely normal" during simple conversation. Neurologic Exam:  Mental status - Awake, Alert, Oriented to person, place, and time. Speech fluent, repetition and naming intact. Follows simple and complex commands. Attention/concentration, recent and remote memory (including registration 3/3 and recall 3/3), and fund of knowledge intact. Mild to moderate dysarthria.    CTA brain:  Left vertebral artery demonstrates calcified plaque and moderate short   segment stenosis at its mid segment. Normal flow-related enhancement   proximal and distal to this level.    MR Head No Cont (09.20.24 @ 08:08) >  Acute/subacute infarction within the right side of the elba   as well as left lateral genu of the corpus callosum with associated   cytotoxic edema and without associated hemorrhage. Embolic etiology   should be considered given different vascular distributions.

## 2024-09-20 NOTE — SPEECH LANGUAGE PATHOLOGY EVALUATION - SLP GENERAL OBSERVATIONS
Pt received in bed, AAOx3, family at bedside. On RA in no distress. Language Line  [employer] [id number] utilized for Mandarin-English translation throughout this visit. Pt and family reported pt's communication is at baseline. Per family member, pt's speech "sounds a little slurred," however, he believes this is due to pt not wearing his dentures at this time.

## 2024-09-20 NOTE — PHYSICAL THERAPY INITIAL EVALUATION ADULT - PERTINENT HX OF CURRENT PROBLEM, REHAB EVAL
PMHx:  HTN, HLD. presents with acute onset gait difficulties, slurred speech, diplopia. LKN is 0700am 9/18. Per pt's family he was at his baseline yesterday morning but then later around noon time was experiencing some gait difficulties. wife noticed him limping to the L side. During the day the pt stayed in bed mostly and did not notice gait difficulties getting worse as he was mostly resting or sleeping. At 2 AM 9/19 he was going to the bathroom and was feeling very imbalanced and unable to walk unassisted.  Pt also reports intermittent dizziness where he felt like he was spinning around. He did not have any nausea or vomiting. This morning his daughter noted that he was unable to use his phone with the L hand and was unable to walk unassisted, requiring daughter or EMS to help out. He is not on any AC/AP at home. While in the ED pt initially had some sensory deficit LLE which later improved on subsequent exam. CTH: without acute infarct, CTA head and neck with L PICA occlusion, L vertebral severe stenosis. Diffuse intracranial atherosclerotic disease seen. NIHSS: 5. Not a tenecteplase candidate as patient is outside of the appropriate window. Not a thrombectomy candidate as no LVO is seen. MRI: Acute/subacute infarction within the right side of the elba as well as left lateral genu of the corpus callosum with associated cytotoxic edema and without associated hemorrhage.

## 2024-09-20 NOTE — PHYSICAL THERAPY INITIAL EVALUATION ADULT - BALANCE TRAINING, PT EVAL
GOAL: (to be met in 4wks) increased sitting and standing balance to good to decreased risk of falls Use Therapeutic Ranged Or Therapeutic Target: please select Range or Target

## 2024-09-20 NOTE — SPEECH LANGUAGE PATHOLOGY EVALUATION - SLP DIAGNOSIS
69M found with L PICA occlusion, L vertebral severe stenosis, and diffuse intracranial atherosclerotic disease p/w speech impairment marked by reduced precision of articulation during SMR task, which pt and family reported they suspect is due to pt not wearing his dentures, and higher level cognitive-linguistic impairment marked by difficulties with semantic fluency and organization. Cardiac 69M found with L PICA occlusion, L vertebral severe stenosis, and diffuse intracranial atherosclerotic disease p/w speech impairment marked by reduced precision of articulation during SMR task and higher level cognitive-linguistic impairment marked by difficulties with semantic fluency and organization.

## 2024-09-20 NOTE — OCCUPATIONAL THERAPY INITIAL EVALUATION ADULT - PLANNED THERAPY INTERVENTIONS, OT EVAL
No ADL retraining/balance training/bed mobility training/fine motor coordination training/transfer training

## 2024-09-20 NOTE — SPEECH LANGUAGE PATHOLOGY EVALUATION - ARTICULATION
Reduced precision of articulation during SMR task and inconsistently during informal speech, which pt and family reported they believe is due to pt not wearing his dentures/imprecise

## 2024-09-20 NOTE — SPEECH LANGUAGE PATHOLOGY EVALUATION - SLP PERTINENT HISTORY OF CURRENT PROBLEM
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

## 2024-09-20 NOTE — PHYSICAL THERAPY INITIAL EVALUATION ADULT - ADDITIONAL COMMENTS
lives with wife in private house with a few steps to enter with bilateral rails (far apart), 1 flight inside with 1 rail, right hand dominant    Mandarin speaking lives with wife in private house with a few steps to enter with bilateral rails (far apart), 1 flight inside with 1 rail, right hand dominant    Mandarin speaking,  service utilized

## 2024-09-21 LAB
ALBUMIN SERPL ELPH-MCNC: 4.3 G/DL — SIGNIFICANT CHANGE UP (ref 3.3–5)
ALP SERPL-CCNC: 86 U/L — SIGNIFICANT CHANGE UP (ref 40–120)
ALT FLD-CCNC: 44 U/L — SIGNIFICANT CHANGE UP (ref 10–45)
ANION GAP SERPL CALC-SCNC: 17 MMOL/L — SIGNIFICANT CHANGE UP (ref 5–17)
AST SERPL-CCNC: 19 U/L — SIGNIFICANT CHANGE UP (ref 10–40)
BILIRUB SERPL-MCNC: 1 MG/DL — SIGNIFICANT CHANGE UP (ref 0.2–1.2)
BUN SERPL-MCNC: 11 MG/DL — SIGNIFICANT CHANGE UP (ref 7–23)
CALCIUM SERPL-MCNC: 9.5 MG/DL — SIGNIFICANT CHANGE UP (ref 8.4–10.5)
CHLORIDE SERPL-SCNC: 104 MMOL/L — SIGNIFICANT CHANGE UP (ref 96–108)
CO2 SERPL-SCNC: 20 MMOL/L — LOW (ref 22–31)
CREAT SERPL-MCNC: 0.93 MG/DL — SIGNIFICANT CHANGE UP (ref 0.5–1.3)
EGFR: 88 ML/MIN/1.73M2 — SIGNIFICANT CHANGE UP
GLUCOSE SERPL-MCNC: 161 MG/DL — HIGH (ref 70–99)
HCT VFR BLD CALC: 45.6 % — SIGNIFICANT CHANGE UP (ref 39–50)
HGB BLD-MCNC: 14.9 G/DL — SIGNIFICANT CHANGE UP (ref 13–17)
MAGNESIUM SERPL-MCNC: 2.3 MG/DL — SIGNIFICANT CHANGE UP (ref 1.6–2.6)
MCHC RBC-ENTMCNC: 27.4 PG — SIGNIFICANT CHANGE UP (ref 27–34)
MCHC RBC-ENTMCNC: 32.7 GM/DL — SIGNIFICANT CHANGE UP (ref 32–36)
MCV RBC AUTO: 84 FL — SIGNIFICANT CHANGE UP (ref 80–100)
NRBC # BLD: 0 /100 WBCS — SIGNIFICANT CHANGE UP (ref 0–0)
PHOSPHATE SERPL-MCNC: 3.5 MG/DL — SIGNIFICANT CHANGE UP (ref 2.5–4.5)
PLATELET # BLD AUTO: 265 K/UL — SIGNIFICANT CHANGE UP (ref 150–400)
POTASSIUM SERPL-MCNC: 4 MMOL/L — SIGNIFICANT CHANGE UP (ref 3.5–5.3)
POTASSIUM SERPL-SCNC: 4 MMOL/L — SIGNIFICANT CHANGE UP (ref 3.5–5.3)
PROT SERPL-MCNC: 7.7 G/DL — SIGNIFICANT CHANGE UP (ref 6–8.3)
RBC # BLD: 5.43 M/UL — SIGNIFICANT CHANGE UP (ref 4.2–5.8)
RBC # FLD: 13.2 % — SIGNIFICANT CHANGE UP (ref 10.3–14.5)
SODIUM SERPL-SCNC: 141 MMOL/L — SIGNIFICANT CHANGE UP (ref 135–145)
WBC # BLD: 10.77 K/UL — HIGH (ref 3.8–10.5)
WBC # FLD AUTO: 10.77 K/UL — HIGH (ref 3.8–10.5)

## 2024-09-21 PROCEDURE — 99233 SBSQ HOSP IP/OBS HIGH 50: CPT | Mod: FS

## 2024-09-21 RX ADMIN — ENOXAPARIN SODIUM 40 MILLIGRAM(S): 150 INJECTION SUBCUTANEOUS at 12:04

## 2024-09-21 RX ADMIN — LOSARTAN POTASSIUM 100 MILLIGRAM(S): 100 TABLET, FILM COATED ORAL at 05:04

## 2024-09-21 RX ADMIN — PANTOPRAZOLE SODIUM 40 MILLIGRAM(S): 40 TABLET, DELAYED RELEASE ORAL at 05:04

## 2024-09-21 RX ADMIN — ATORVASTATIN CALCIUM 80 MILLIGRAM(S): 10 TABLET, FILM COATED ORAL at 21:07

## 2024-09-21 RX ADMIN — Medication 81 MILLIGRAM(S): at 12:04

## 2024-09-21 NOTE — PROGRESS NOTE ADULT - SUBJECTIVE AND OBJECTIVE BOX
THE PATIENT WAS SEEN AND EXAMINED BY ME WITH THE HOUSESTAFF AND STROKE TEAM DURING MORNING ROUNDS.   HPI:  70 y/o male R handed with a PMH of HTN, HLD presents with acute onset gait difficulties, slurred speech, diplopia. LKN is 0700 am 9/18. Per patients family he was at his baseline on 09/18/24 when he dropped his daughter at the train station in the morning but then later around noon time when he went to get lunch with his wife he was experiencing some gait difficulties. She noticed him limping to the L side. During the day the patient stayed in bed mostly and did not notice gait difficulties getting worse as he was mostly resting or sleeping. At 2 AM 9/19 he was going to the bathroom and was feeling very imbalanced and unable to walk unassisted.  Patient also reports intermittent dizziness where he felt like he was spinning around. He did not have any nausea or vomiting. On 09/19/24 his daughter noted that he was unable to use his phone with the L hand and was unable to walk unassisted, requiring daughter or EMS to help out. He is not on any AC/AP at home. Patient is able to normally walk by himself does not need a cane or a walker. 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 While in the ED patient initially had some sensory deficit LLE which later improved on subsequent exam. Denies a headache at this time.     Denies a prior history of stroke. Denies dysphagia. States he was a smoker for about 5 years 40 years ago but nothing recently. Does not drink alcohol. Denies illicit drug use.       SUBJECTIVE: No events overnight.  No new neurologic complaints, ROS reported negative unless otherwise noted.      aspirin  chewable 81 milliGRAM(s) Oral daily  atorvastatin 80 milliGRAM(s) Oral at bedtime  enoxaparin Injectable 40 milliGRAM(s) SubCutaneous every 24 hours  losartan 100 milliGRAM(s) Oral daily  pantoprazole    Tablet 40 milliGRAM(s) Oral before breakfast      PHYSICAL EXAM:   Vital Signs Last 24 Hrs  T(C): 36.8 (21 Sep 2024 13:31), Max: 37.1 (20 Sep 2024 20:05)  T(F): 98.3 (21 Sep 2024 13:31), Max: 98.8 (20 Sep 2024 20:05)  HR: 65 (21 Sep 2024 13:31) (60 - 70)  BP: 118/75 (21 Sep 2024 13:31) (118/75 - 158/94)  BP(mean): --  RR: 18 (21 Sep 2024 13:31) (18 - 18)  SpO2: 95% (21 Sep 2024 13:31) (94% - 97%)    Parameters below as of 21 Sep 2024 13:31  Patient On (Oxygen Delivery Method): room air        General: No acute distress  HEENT: EOM intact, visual fields full  Abdomen: Soft, nontender, nondistended   Extremities: No edema    NEUROLOGICAL EXAM:  Mental status: Eyes open, awake, alert, oriented x3, fluent speech, no neglect, able to follow commands   Cranial Nerves: No facial asymmetry, no nystagmus, moderate dysarthria,   Motor exam: Normal tone, RUE 5/5, RLE 5/5, Left hemiparesis  LUE 3/5, LLE drift 4+/5  Sensation: Intact to light touch   Coordination/ Gait: No dysmetria,      LABS:                        14.9   10.77 )-----------( 265      ( 21 Sep 2024 09:56 )             45.6    09-21    141  |  104  |  11  ----------------------------<  161[H]  4.0   |  20[L]  |  0.93    Ca    9.5      21 Sep 2024 09:56  Phos  3.5     09-21  Mg     2.3     09-21    TPro  7.7  /  Alb  4.3  /  TBili  1.0  /  DBili  x   /  AST  19  /  ALT  44  /  AlkPhos  86  09-21        IMAGING: Reviewed by me.     Brain MRI 09/20/24: Acute/subacute infarction within the right side of the elba as well as left lateral genu of the corpus callosum with associated cytotoxic edema and without associated hemorrhage.     09/19/24;  CT brain: No hydrocephalus, acute intracranial hemorrhage, mass effect, or brain edema.    CT brain perfusion: No significant technical limitation. Cerebral blood flow less than 30% = 0 mL. Tmax greater than 6 seconds = 18 mL and involves the bilateral posterior cerebellar hemispheres and posterior vermis. Mismatch volume: 18 mL. Mismatch ratio: Infinite    CTA brain: Left vertebral artery demonstrates calcified plaque and moderate short  segment stenosis at its mid segment. Normal flow-related enhancement proximal and distal to this level. Left PICA not well visualized. No vascular aneurysm. No AVM. Venous system is well opacified, no evidence for venous sinus or cortical vein thrombosis.    CTA neck: No flow-limiting stenosis, evidence for arterial dissection, or vascular aneurysm.

## 2024-09-21 NOTE — PROGRESS NOTE ADULT - ASSESSMENT
ASSESSMENT: 70 y/o male R handed with a PMH of HTN, HLD, former smoker, presents with acute onset gait difficulties, slurred speech, diplopia. LKN is 0700 am 9/18. 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 Diffuse intracranial atherosclerotic disease seen. On exam was found to have L ataxic hemiparesis and dysarthria. 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    NEURO: Neuro exam unchanged, continue close monitoring for neurologic deterioration, permissive HTN with slow titration to normotensive, Pt started on high dose statin, titrate statin to LDL goal less than 70, MRI Brain CTA head/neck results as above. Physical therapy/OT/Speech eval with recommendations for AR     ANTITHROMBOTIC THERAPY: ASA for secondary stroke prevention    PULMONARY: protecting airway, saturating well     CARDIOVASCULAR: TTE: unremarkable, cardiac monitoring; no events. Plan to obtain LOOP to monitor for occult arrhythmias like Afib, Dr Neves (cardio) consult appreciated                              SBP goal: 110-180mmhg    GASTROINTESTINAL: Dysphagia screen  passed, tolerating diet     Diet: regular    RENAL: BUN/Cr stable, good urine output      Na Goal: Greater than 135     [place]: No    HEMATOLOGY: H/H stable, Platelets normal      DVT ppx: LMWH     ID: afebrile, leukocytosis: likely reactive, will continue to monitor     OTHER: Plan/goals of care discussed with pt and pt's daughter at bedside    DISPOSITION: Acute Rehab as per  PT/OT eval once stable and workup is complete    CORE MEASURES:        Admission NIHSS: 5     TPA:  NO      LDL/HDL: 101/33     Depression Screen: 0     Statin Therapy: Y     Dysphagia Screen: PASS      Smoking  NO (Former)     Afib  NO     Stroke Education YES    Obtain screening ultrasound in patients who meet 1 or more of the following criteria as patient is high risk for DVT/PE upon admission:   [] History of DVT/PE  []Hypercoagulable states (Factor V Leiden, Cancer, OCP, etc. )  []Prolonged immobility (hemiplegia/hemiparesis/post operative or any other extended immobilization)   [] Transferred from outside facility (Rehab or Long term care)  [] Age </= to 50

## 2024-09-21 NOTE — PROGRESS NOTE ADULT - SUBJECTIVE AND OBJECTIVE BOX
Subjective: Patient seen and examined. No new events except as noted.     REVIEW OF SYSTEMS:    CONSTITUTIONAL: + weakness, fevers or chills  EYES/ENT: No visual changes;  No vertigo or throat pain   NECK: No pain or stiffness  RESPIRATORY: No cough, wheezing, hemoptysis; No shortness of breath  CARDIOVASCULAR: No chest pain or palpitations  GASTROINTESTINAL: No abdominal or epigastric pain. No nausea, vomiting, or hematemesis; No diarrhea or constipation. No melena or hematochezia.  GENITOURINARY: No dysuria, frequency or hematuria  NEUROLOGICAL: + numbness or weakness  SKIN: No itching, burning, rashes, or lesions   All other review of systems is negative unless indicated above.    MEDICATIONS:  MEDICATIONS  (STANDING):  aspirin  chewable 81 milliGRAM(s) Oral daily  atorvastatin 80 milliGRAM(s) Oral at bedtime  enoxaparin Injectable 40 milliGRAM(s) SubCutaneous every 24 hours  losartan 100 milliGRAM(s) Oral daily  pantoprazole    Tablet 40 milliGRAM(s) Oral before breakfast      PHYSICAL EXAM:  T(C): 36.9 (09-21-24 @ 20:52), Max: 36.9 (09-20-24 @ 23:31)  HR: 53 (09-21-24 @ 20:52) (53 - 70)  BP: 117/69 (09-21-24 @ 20:52) (117/69 - 149/85)  RR: 18 (09-21-24 @ 20:52) (18 - 18)  SpO2: 95% (09-21-24 @ 20:52) (94% - 96%)  Wt(kg): --  I&O's Summary    21 Sep 2024 07:01  -  21 Sep 2024 21:25  --------------------------------------------------------  IN: 440 mL / OUT: 0 mL / NET: 440 mL            Appearance: NAD  HEENT:   Dry  oral mucosa, PERRL, EOMI	  Lymphatic: No lymphadenopathy  Cardiovascular: Normal S1 S2, No JVD, No murmurs, No edema  Respiratory: Lungs clear to auscultation	  Psychiatry: A & O x 3, lethargic  Gastrointestinal:  Soft, Non-tender, + BS	  Skin: No rashes, No ecchymoses, No cyanosis	  Neurologic: Awake, Alert, Oriented to person, place, and time. Speech fluent, repetition and naming intact. Follows simple and complex commands. Attention/concentration, recent and remote memory (including registration 3/3 and recall 3/3), and fund of knowledge intact. Mild to moderate dysarthria.     Cranial nerves - PERRLA (4mm -> 3mm b/l), VFF, EOMI, face sensation (V1-V3) intact b/l,LFacial droop hearing intact b/l, palate with symmetric elevation, tongue midline on protrusion with full lateral movement    Motor - Normal bulk and tone throughout. +drift LUE, LLE    Strength testing            Deltoid      Biceps      Triceps     Wrist Extension    Wrist Flexion     Interossei         R            5                 5               5                     5                              5                        5                 5  L             4                 4               4                     4                              4                        4                 4              Hip Flexion    Hip Extension    Knee Flexion    Knee Extension    Dorsiflexion    Plantar Flexion  R              5                         5                       5                           5                            5                          5  L              4                         4                        5                           5                            5                          5    Sensation - Light touch  intact throughout    DTR's -             Biceps      Triceps     Brachioradialis      Patellar    Ankle    Toes/plantar response  R             2+             2+                  2+                       2+            2+                 Down  L              2+             2+                 2+                        2+           2+                 Down    Coordination - Ataxic on L heel to shin, dystaxic on L FTN however not out of proportion to weakness    Gait and station - Not assessed  Extremities: Normal range of motion, No clubbing, cyanosis or edema  Vascular: Peripheral pulses palpable 2+ bilaterally      LABS:    CARDIAC MARKERS:                                14.9   10.77 )-----------( 265      ( 21 Sep 2024 09:56 )             45.6     09-21    141  |  104  |  11  ----------------------------<  161[H]  4.0   |  20[L]  |  0.93    Ca    9.5      21 Sep 2024 09:56  Phos  3.5     09-21  Mg     2.3     09-21    TPro  7.7  /  Alb  4.3  /  TBili  1.0  /  DBili  x   /  AST  19  /  ALT  44  /  AlkPhos  86  09-21    proBNP:   Lipid Profile:   HgA1c:   TSH:           S  TELEMETRY: 	  ESR  ECG:  	  RADIOLOGY:   DIAGNOSTIC TESTING:  [ ] Echocardiogram:  [ ]  Catheterization:  [ ] Stress Test:    OTHER:

## 2024-09-21 NOTE — PROGRESS NOTE ADULT - NS ATTEND AMEND GEN_ALL_CORE FT
I saw and examined the patient, reviewed diagnostic studies, and reviewed images personally. I agree with NP/PA’s history, exam, orders placed, and plan of care. Total care time spent, 35 minutes. Medical issues needing to be addressed include: Cerebral infarction w/ cytotoxic cerebral edema, HTN, HLD, gait difficulty/imbalance, dysarthria, L hemiparesis, cerebral athero including L vert athero and stenosis. MRI shows L lateral CC infarct and R elba. ESUS, f up echo, check risk factors, will need ILR. Cont aspirin. Pt has not gotten any age appropriate cancer screening. CT CAP.

## 2024-09-22 LAB — TSH SERPL-MCNC: 1.03 UIU/ML — SIGNIFICANT CHANGE UP (ref 0.27–4.2)

## 2024-09-22 PROCEDURE — 99233 SBSQ HOSP IP/OBS HIGH 50: CPT | Mod: FS

## 2024-09-22 RX ADMIN — LOSARTAN POTASSIUM 100 MILLIGRAM(S): 100 TABLET, FILM COATED ORAL at 06:10

## 2024-09-22 RX ADMIN — PANTOPRAZOLE SODIUM 40 MILLIGRAM(S): 40 TABLET, DELAYED RELEASE ORAL at 06:10

## 2024-09-22 RX ADMIN — ATORVASTATIN CALCIUM 80 MILLIGRAM(S): 10 TABLET, FILM COATED ORAL at 21:59

## 2024-09-22 RX ADMIN — ENOXAPARIN SODIUM 40 MILLIGRAM(S): 150 INJECTION SUBCUTANEOUS at 11:40

## 2024-09-22 RX ADMIN — Medication 81 MILLIGRAM(S): at 11:40

## 2024-09-22 NOTE — PROGRESS NOTE ADULT - SUBJECTIVE AND OBJECTIVE BOX
THE PATIENT WAS SEEN AND EXAMINED BY ME WITH THE HOUSESTAFF AND STROKE TEAM DURING MORNING ROUNDS.   HPI:    70 y/o male R handed with a PMH of HTN, HLD presents with acute onset gait difficulties, slurred speech, diplopia. LKN is 0700 am 9/18. Per patients family he was at his baseline on 09/18/24 when he dropped his daughter at the train station in the morning but then later around noon time when he went to get lunch with his wife he was experiencing some gait difficulties. She noticed him limping to the L side. During the day the patient stayed in bed mostly and did not notice gait difficulties getting worse as he was mostly resting or sleeping. At 2 AM 9/19 he was going to the bathroom and was feeling very imbalanced and unable to walk unassisted.  Patient also reports intermittent dizziness where he felt like he was spinning around. He did not have any nausea or vomiting. On 09/19/24 his daughter noted that he was unable to use his phone with the L hand and was unable to walk unassisted, requiring daughter or EMS to help out. He is not on any AC/AP at home. Patient is able to normally walk by himself does not need a cane or a walker. 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 While in the ED patient initially had some sensory deficit LLE which later improved on subsequent exam. Denies a headache at this time.     Denies a prior history of stroke. Denies dysphagia. States he was a smoker for about 5 years 40 years ago but nothing recently. Does not drink alcohol. Denies illicit drug use    SUBJECTIVE: No events overnight.  No new neurologic complaints, ROS reported negative unless otherwise noted.      aspirin  chewable 81 milliGRAM(s) Oral daily  atorvastatin 80 milliGRAM(s) Oral at bedtime  enoxaparin Injectable 40 milliGRAM(s) SubCutaneous every 24 hours  losartan 100 milliGRAM(s) Oral daily  pantoprazole    Tablet 40 milliGRAM(s) Oral before breakfast      PHYSICAL EXAM:   Vital Signs Last 24 Hrs  T(C): 36.9 (22 Sep 2024 12:40), Max: 36.9 (21 Sep 2024 15:36)  T(F): 98.4 (22 Sep 2024 12:40), Max: 98.5 (21 Sep 2024 20:52)  HR: 71 (22 Sep 2024 12:40) (53 - 71)  BP: 124/78 (22 Sep 2024 12:40) (103/60 - 128/77)  BP(mean): --  RR: 18 (22 Sep 2024 12:40) (18 - 18)  SpO2: 96% (22 Sep 2024 12:40) (95% - 97%)    Parameters below as of 22 Sep 2024 12:40  Patient On (Oxygen Delivery Method): room air        General: No acute distress  HEENT: EOM intact, visual fields full  Abdomen: Soft, nontender, nondistended   Extremities: No edema    NEUROLOGICAL EXAM:  Mental status: Eyes open, awake, alert, oriented x3, fluent speech, no neglect, able to follow commands   Cranial Nerves: No facial asymmetry, no nystagmus, moderate dysarthria,   Motor exam: Normal tone, RUE 5/5, RLE 5/5, Left hemiparesis  LUE prox 3/5, distally 2/5,  2/5, LLE drift 4+/5  Sensation: Intact to light touch   Coordination/ Gait: No dysmetria,          LABS:                        14.9   10.77 )-----------( 265      ( 21 Sep 2024 09:56 )             45.6    09-21    141  |  104  |  11  ----------------------------<  161[H]  4.0   |  20[L]  |  0.93    Ca    9.5      21 Sep 2024 09:56  Phos  3.5     09-21  Mg     2.3     09-21    TPro  7.7  /  Alb  4.3  /  TBili  1.0  /  DBili  x   /  AST  19  /  ALT  44  /  AlkPhos  86  09-21        IMAGING: Reviewed by me.     Brain MRI 09/20/24: Acute/subacute infarction within the right side of the elba as well as left lateral genu of the corpus callosum with associated cytotoxic edema and without associated hemorrhage.     09/19/24;  CT brain: No hydrocephalus, acute intracranial hemorrhage, mass effect, or brain edema.    CT brain perfusion: No significant technical limitation. Cerebral blood flow less than 30% = 0 mL. Tmax greater than 6 seconds = 18 mL and involves the bilateral posterior cerebellar hemispheres and posterior vermis. Mismatch volume: 18 mL. Mismatch ratio: Infinite    CTA brain: Left vertebral artery demonstrates calcified plaque and moderate short  segment stenosis at its mid segment. Normal flow-related enhancement proximal and distal to this level. Left PICA not well visualized. No vascular aneurysm. No AVM. Venous system is well opacified, no evidence for venous sinus or cortical vein thrombosis.    CTA neck: No flow-limiting stenosis, evidence for arterial dissection, or vascular aneurysm.

## 2024-09-22 NOTE — PROGRESS NOTE ADULT - NS ATTEND AMEND GEN_ALL_CORE FT
I saw and examined the patient, reviewed diagnostic studies, and reviewed images personally. I agree with NP/PA’s history, exam, orders placed, and plan of care. Total care time spent, 35 minutes. Medical issues needing to be addressed include: Cerebral infarction w/ cytotoxic cerebral edema, HTN, HLD, gait difficulty/imbalance, dysarthria, L hemiparesis, cerebral athero including L vert athero and stenosis. MRI shows L lateral CC infarct and R elba. ESUS, pending ILR.

## 2024-09-22 NOTE — PROGRESS NOTE ADULT - SUBJECTIVE AND OBJECTIVE BOX
Subjective: Patient seen and examined. No new events except as noted.     REVIEW OF SYSTEMS:    CONSTITUTIONAL: + weakness, fevers or chills  EYES/ENT: No visual changes;  No vertigo or throat pain   NECK: No pain or stiffness  RESPIRATORY: No cough, wheezing, hemoptysis; No shortness of breath  CARDIOVASCULAR: No chest pain or palpitations  GASTROINTESTINAL: No abdominal or epigastric pain. No nausea, vomiting, or hematemesis; No diarrhea or constipation. No melena or hematochezia.  GENITOURINARY: No dysuria, frequency or hematuria  NEUROLOGICAL: + numbness or weakness  SKIN: No itching, burning, rashes, or lesions   All other review of systems is negative unless indicated above.    MEDICATIONS:  MEDICATIONS  (STANDING):  aspirin  chewable 81 milliGRAM(s) Oral daily  atorvastatin 80 milliGRAM(s) Oral at bedtime  enoxaparin Injectable 40 milliGRAM(s) SubCutaneous every 24 hours  losartan 100 milliGRAM(s) Oral daily  pantoprazole    Tablet 40 milliGRAM(s) Oral before breakfast      PHYSICAL EXAM:  T(C): 36.4 (09-22-24 @ 00:08), Max: 36.9 (09-21-24 @ 15:36)  HR: 65 (09-22-24 @ 00:08) (53 - 65)  BP: 103/60 (09-22-24 @ 00:08) (103/60 - 135/85)  RR: 18 (09-22-24 @ 00:08) (18 - 18)  SpO2: 97% (09-22-24 @ 00:08) (95% - 97%)  Wt(kg): --  I&O's Summary    21 Sep 2024 07:01  -  22 Sep 2024 07:00  --------------------------------------------------------  IN: 440 mL / OUT: 0 mL / NET: 440 mL              Appearance: NAD  HEENT:   Dry  oral mucosa, PERRL, EOMI	  Lymphatic: No lymphadenopathy  Cardiovascular: Normal S1 S2, No JVD, No murmurs, No edema  Respiratory: Lungs clear to auscultation	  Psychiatry: A & O x 3, lethargic  Gastrointestinal:  Soft, Non-tender, + BS	  Skin: No rashes, No ecchymoses, No cyanosis	  Neurologic: Awake, Alert, Oriented to person, place, and time. Speech fluent, repetition and naming intact. Follows simple and complex commands. Attention/concentration, recent and remote memory (including registration 3/3 and recall 3/3), and fund of knowledge intact. Mild to moderate dysarthria.     Cranial nerves - PERRLA (4mm -> 3mm b/l), VFF, EOMI, face sensation (V1-V3) intact b/l,LFacial droop hearing intact b/l, palate with symmetric elevation, tongue midline on protrusion with full lateral movement    Motor - Normal bulk and tone throughout. +drift LUE, LLE    Strength testing            Deltoid      Biceps      Triceps     Wrist Extension    Wrist Flexion     Interossei         R            5                 5               5                     5                              5                        5                 5  L             4                 4               4                     4                              4                        4                 4              Hip Flexion    Hip Extension    Knee Flexion    Knee Extension    Dorsiflexion    Plantar Flexion  R              5                         5                       5                           5                            5                          5  L              4                         4                        5                           5                            5                          5    Sensation - Light touch  intact throughout    DTR's -             Biceps      Triceps     Brachioradialis      Patellar    Ankle    Toes/plantar response  R             2+             2+                  2+                       2+            2+                 Down  L              2+             2+                 2+                        2+           2+                 Down    Coordination - Ataxic on L heel to shin, dystaxic on L FTN however not out of proportion to weakness    Gait and station - Not assessed  Extremities: Normal range of motion, No clubbing, cyanosis or edema  Vascular: Peripheral pulses palpable 2+ bilaterally          LABS:    CARDIAC MARKERS:                                14.9   10.77 )-----------( 265      ( 21 Sep 2024 09:56 )             45.6     09-21    141  |  104  |  11  ----------------------------<  161[H]  4.0   |  20[L]  |  0.93    Ca    9.5      21 Sep 2024 09:56  Phos  3.5     09-21  Mg     2.3     09-21    TPro  7.7  /  Alb  4.3  /  TBili  1.0  /  DBili  x   /  AST  19  /  ALT  44  /  AlkPhos  86  09-21    proBNP:   Lipid Profile:   HgA1c:   TSH:             TELEMETRY: 	 SR    ECG:  	  RADIOLOGY:   DIAGNOSTIC TESTING:  [ ] Echocardiogram:  [ ]  Catheterization:  [ ] Stress Test:    OTHER:

## 2024-09-23 ENCOUNTER — TRANSCRIPTION ENCOUNTER (OUTPATIENT)
Age: 69
End: 2024-09-23

## 2024-09-23 ENCOUNTER — INPATIENT (INPATIENT)
Facility: HOSPITAL | Age: 69
LOS: 16 days | Discharge: HOME | DRG: 66 | End: 2024-10-10
Attending: PHYSICAL MEDICINE & REHABILITATION | Admitting: PHYSICAL MEDICINE & REHABILITATION
Payer: MEDICARE

## 2024-09-23 VITALS
RESPIRATION RATE: 15 BRPM | OXYGEN SATURATION: 94 % | SYSTOLIC BLOOD PRESSURE: 121 MMHG | TEMPERATURE: 98 F | WEIGHT: 145.28 LBS | HEART RATE: 61 BPM | HEIGHT: 68 IN | DIASTOLIC BLOOD PRESSURE: 76 MMHG

## 2024-09-23 VITALS
TEMPERATURE: 98 F | DIASTOLIC BLOOD PRESSURE: 78 MMHG | OXYGEN SATURATION: 96 % | RESPIRATION RATE: 18 BRPM | SYSTOLIC BLOOD PRESSURE: 130 MMHG | HEART RATE: 57 BPM

## 2024-09-23 DIAGNOSIS — I63.9 CEREBRAL INFARCTION, UNSPECIFIED: ICD-10-CM

## 2024-09-23 LAB
ALBUMIN SERPL ELPH-MCNC: 3.9 G/DL — SIGNIFICANT CHANGE UP (ref 3.3–5)
ALP SERPL-CCNC: 74 U/L — SIGNIFICANT CHANGE UP (ref 40–120)
ALT FLD-CCNC: 32 U/L — SIGNIFICANT CHANGE UP (ref 10–45)
ANION GAP SERPL CALC-SCNC: 16 MMOL/L — SIGNIFICANT CHANGE UP (ref 5–17)
AST SERPL-CCNC: 18 U/L — SIGNIFICANT CHANGE UP (ref 10–40)
BILIRUB SERPL-MCNC: 0.5 MG/DL — SIGNIFICANT CHANGE UP (ref 0.2–1.2)
BUN SERPL-MCNC: 15 MG/DL — SIGNIFICANT CHANGE UP (ref 7–23)
CALCIUM SERPL-MCNC: 9.3 MG/DL — SIGNIFICANT CHANGE UP (ref 8.4–10.5)
CHLORIDE SERPL-SCNC: 105 MMOL/L — SIGNIFICANT CHANGE UP (ref 96–108)
CO2 SERPL-SCNC: 20 MMOL/L — LOW (ref 22–31)
CREAT SERPL-MCNC: 1.17 MG/DL — SIGNIFICANT CHANGE UP (ref 0.5–1.3)
EGFR: 67 ML/MIN/1.73M2 — SIGNIFICANT CHANGE UP
GLUCOSE SERPL-MCNC: 103 MG/DL — HIGH (ref 70–99)
HCT VFR BLD CALC: 43 % — SIGNIFICANT CHANGE UP (ref 39–50)
HGB BLD-MCNC: 13.6 G/DL — SIGNIFICANT CHANGE UP (ref 13–17)
MCHC RBC-ENTMCNC: 27.6 PG — SIGNIFICANT CHANGE UP (ref 27–34)
MCHC RBC-ENTMCNC: 31.6 GM/DL — LOW (ref 32–36)
MCV RBC AUTO: 87.2 FL — SIGNIFICANT CHANGE UP (ref 80–100)
NRBC # BLD: 0 /100 WBCS — SIGNIFICANT CHANGE UP (ref 0–0)
PLATELET # BLD AUTO: 250 K/UL — SIGNIFICANT CHANGE UP (ref 150–400)
POTASSIUM SERPL-MCNC: 3.6 MMOL/L — SIGNIFICANT CHANGE UP (ref 3.5–5.3)
POTASSIUM SERPL-SCNC: 3.6 MMOL/L — SIGNIFICANT CHANGE UP (ref 3.5–5.3)
PROT SERPL-MCNC: 7 G/DL — SIGNIFICANT CHANGE UP (ref 6–8.3)
RBC # BLD: 4.93 M/UL — SIGNIFICANT CHANGE UP (ref 4.2–5.8)
RBC # FLD: 13.1 % — SIGNIFICANT CHANGE UP (ref 10.3–14.5)
SARS-COV-2 RNA SPEC QL NAA+PROBE: SIGNIFICANT CHANGE UP
SODIUM SERPL-SCNC: 141 MMOL/L — SIGNIFICANT CHANGE UP (ref 135–145)
WBC # BLD: 6.7 K/UL — SIGNIFICANT CHANGE UP (ref 3.8–10.5)
WBC # FLD AUTO: 6.7 K/UL — SIGNIFICANT CHANGE UP (ref 3.8–10.5)

## 2024-09-23 PROCEDURE — 84100 ASSAY OF PHOSPHORUS: CPT

## 2024-09-23 PROCEDURE — 80048 BASIC METABOLIC PNL TOTAL CA: CPT

## 2024-09-23 PROCEDURE — 82962 GLUCOSE BLOOD TEST: CPT

## 2024-09-23 PROCEDURE — 84295 ASSAY OF SERUM SODIUM: CPT

## 2024-09-23 PROCEDURE — 82435 ASSAY OF BLOOD CHLORIDE: CPT

## 2024-09-23 PROCEDURE — 70496 CT ANGIOGRAPHY HEAD: CPT | Mod: MC

## 2024-09-23 PROCEDURE — 33285 INSJ SUBQ CAR RHYTHM MNTR: CPT

## 2024-09-23 PROCEDURE — 85027 COMPLETE CBC AUTOMATED: CPT

## 2024-09-23 PROCEDURE — 85610 PROTHROMBIN TIME: CPT

## 2024-09-23 PROCEDURE — 70450 CT HEAD/BRAIN W/O DYE: CPT | Mod: MC

## 2024-09-23 PROCEDURE — 83735 ASSAY OF MAGNESIUM: CPT

## 2024-09-23 PROCEDURE — 74177 CT ABD & PELVIS W/CONTRAST: CPT | Mod: MC

## 2024-09-23 PROCEDURE — 84132 ASSAY OF SERUM POTASSIUM: CPT

## 2024-09-23 PROCEDURE — 84484 ASSAY OF TROPONIN QUANT: CPT

## 2024-09-23 PROCEDURE — 85730 THROMBOPLASTIN TIME PARTIAL: CPT

## 2024-09-23 PROCEDURE — 70498 CT ANGIOGRAPHY NECK: CPT | Mod: MC

## 2024-09-23 PROCEDURE — 36415 COLL VENOUS BLD VENIPUNCTURE: CPT

## 2024-09-23 PROCEDURE — 85018 HEMOGLOBIN: CPT

## 2024-09-23 PROCEDURE — 82803 BLOOD GASES ANY COMBINATION: CPT

## 2024-09-23 PROCEDURE — 97110 THERAPEUTIC EXERCISES: CPT

## 2024-09-23 PROCEDURE — 74177 CT ABD & PELVIS W/CONTRAST: CPT | Mod: 26

## 2024-09-23 PROCEDURE — 97116 GAIT TRAINING THERAPY: CPT

## 2024-09-23 PROCEDURE — 71260 CT THORAX DX C+: CPT | Mod: 26

## 2024-09-23 PROCEDURE — 70551 MRI BRAIN STEM W/O DYE: CPT | Mod: MC

## 2024-09-23 PROCEDURE — 99285 EMERGENCY DEPT VISIT HI MDM: CPT

## 2024-09-23 PROCEDURE — 85025 COMPLETE CBC W/AUTO DIFF WBC: CPT

## 2024-09-23 PROCEDURE — 92523 SPEECH SOUND LANG COMPREHEN: CPT

## 2024-09-23 PROCEDURE — 97530 THERAPEUTIC ACTIVITIES: CPT

## 2024-09-23 PROCEDURE — 93005 ELECTROCARDIOGRAM TRACING: CPT

## 2024-09-23 PROCEDURE — 97162 PT EVAL MOD COMPLEX 30 MIN: CPT

## 2024-09-23 PROCEDURE — 97166 OT EVAL MOD COMPLEX 45 MIN: CPT

## 2024-09-23 PROCEDURE — 84443 ASSAY THYROID STIM HORMONE: CPT

## 2024-09-23 PROCEDURE — 83036 HEMOGLOBIN GLYCOSYLATED A1C: CPT

## 2024-09-23 PROCEDURE — 80053 COMPREHEN METABOLIC PANEL: CPT

## 2024-09-23 PROCEDURE — 71260 CT THORAX DX C+: CPT | Mod: MC

## 2024-09-23 PROCEDURE — 80061 LIPID PANEL: CPT

## 2024-09-23 PROCEDURE — C1764: CPT

## 2024-09-23 PROCEDURE — 85014 HEMATOCRIT: CPT

## 2024-09-23 PROCEDURE — 0042T: CPT | Mod: MC

## 2024-09-23 PROCEDURE — 82947 ASSAY GLUCOSE BLOOD QUANT: CPT

## 2024-09-23 PROCEDURE — 93306 TTE W/DOPPLER COMPLETE: CPT

## 2024-09-23 PROCEDURE — 83605 ASSAY OF LACTIC ACID: CPT

## 2024-09-23 PROCEDURE — 82330 ASSAY OF CALCIUM: CPT

## 2024-09-23 RX ORDER — LOSARTAN POTASSIUM 100 MG/1
1 TABLET, FILM COATED ORAL
Qty: 0 | Refills: 0 | DISCHARGE
Start: 2024-09-23

## 2024-09-23 RX ORDER — PANTOPRAZOLE SODIUM 40 MG/1
40 TABLET, DELAYED RELEASE ORAL
Refills: 0 | Status: DISCONTINUED | OUTPATIENT
Start: 2024-09-23 | End: 2024-10-10

## 2024-09-23 RX ORDER — ATORVASTATIN CALCIUM 10 MG/1
80 TABLET, FILM COATED ORAL AT BEDTIME
Refills: 0 | Status: DISCONTINUED | OUTPATIENT
Start: 2024-09-23 | End: 2024-09-26

## 2024-09-23 RX ORDER — ASPIRIN 325 MG
81 TABLET ORAL DAILY
Refills: 0 | Status: DISCONTINUED | OUTPATIENT
Start: 2024-09-23 | End: 2024-10-10

## 2024-09-23 RX ORDER — SENNOSIDES 8.6 MG
2 TABLET ORAL AT BEDTIME
Refills: 0 | Status: DISCONTINUED | OUTPATIENT
Start: 2024-09-23 | End: 2024-10-03

## 2024-09-23 RX ORDER — ATORVASTATIN CALCIUM 10 MG/1
1 TABLET, FILM COATED ORAL
Qty: 0 | Refills: 0 | DISCHARGE
Start: 2024-09-23

## 2024-09-23 RX ORDER — ACETAMINOPHEN 325 MG
650 TABLET ORAL EVERY 6 HOURS
Refills: 0 | Status: DISCONTINUED | OUTPATIENT
Start: 2024-09-23 | End: 2024-09-26

## 2024-09-23 RX ORDER — PANTOPRAZOLE SODIUM 40 MG/1
1 TABLET, DELAYED RELEASE ORAL
Qty: 0 | Refills: 0 | DISCHARGE
Start: 2024-09-23

## 2024-09-23 RX ORDER — LOSARTAN POTASSIUM 100 MG/1
100 TABLET, FILM COATED ORAL DAILY
Refills: 0 | Status: DISCONTINUED | OUTPATIENT
Start: 2024-09-23 | End: 2024-10-10

## 2024-09-23 RX ORDER — ASPIRIN 325 MG
1 TABLET ORAL
Qty: 0 | Refills: 0 | DISCHARGE
Start: 2024-09-23

## 2024-09-23 RX ORDER — 5-HYDROXYTRYPTOPHAN (5-HTP) 100 MG
6 TABLET,DISINTEGRATING ORAL AT BEDTIME
Refills: 0 | Status: DISCONTINUED | OUTPATIENT
Start: 2024-09-23 | End: 2024-10-10

## 2024-09-23 RX ORDER — ENOXAPARIN SODIUM 150 MG/ML
40 INJECTION SUBCUTANEOUS EVERY 24 HOURS
Refills: 0 | Status: DISCONTINUED | OUTPATIENT
Start: 2024-09-23 | End: 2024-10-10

## 2024-09-23 RX ADMIN — Medication 2 TABLET(S): at 21:03

## 2024-09-23 RX ADMIN — ENOXAPARIN SODIUM 40 MILLIGRAM(S): 150 INJECTION SUBCUTANEOUS at 11:27

## 2024-09-23 RX ADMIN — Medication 650 MILLIGRAM(S): at 23:54

## 2024-09-23 RX ADMIN — LOSARTAN POTASSIUM 100 MILLIGRAM(S): 100 TABLET, FILM COATED ORAL at 05:44

## 2024-09-23 RX ADMIN — Medication 81 MILLIGRAM(S): at 11:26

## 2024-09-23 RX ADMIN — ATORVASTATIN CALCIUM 80 MILLIGRAM(S): 10 TABLET, FILM COATED ORAL at 21:03

## 2024-09-23 RX ADMIN — PANTOPRAZOLE SODIUM 40 MILLIGRAM(S): 40 TABLET, DELAYED RELEASE ORAL at 05:44

## 2024-09-23 NOTE — H&P ADULT - HISTORY OF PRESENT ILLNESS
68 y/o male R handed with a PMH of HTN, HLD presents to [facility] on 9/19/24 with acute onset gait difficulties, left side weakness, slurred speech, intermittent dizziness, and diplopia. LKN is 0700 am 9/18. 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 TTE showed normal LV function. Patient was not a candidate for TNK 2/2 outside of appropriate window, and no mechanical thrombectomy as there is no LVO. Aspirin and high dose statin was initiated for conservative treatment. Hospital course significant for elevated LFTs, CT C/A/P showed cholelithiasis, patient to f/u with outpatient GI as he is asymtpomatic. Patient optimized and was evaluated by PM&R and therapy for functional deficits, gait/ADL impairments and acute rehabilitation was recommended. Patient was cleared for discharge to [facility] IRU on 9/23/24. 70 y/o male RH-dominant, with a PMH of HTN, HLD who presents to [facility] on 9/19/24 with acute onset gait difficulties, left side weakness, slurred speech, intermittent dizziness, and diplopia. LKN is 0700 am 9/18. 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 TTE showed normal LV function. Patient was not a candidate for TNK as he was outside of appropriate window, nor mechanical thrombectomy as there was no LVO. Aspirin and high dose statin was initiated.    Hospital course significant for elevated LFTs, CT C/A/P showed cholelithiasis, patient is recommended to f/u with outpatient GI as he is asymptomatic.     Patient optimized and was evaluated by PM&R and therapy for functional deficits, gait/ADL impairments and acute rehabilitation was recommended. Patient was cleared for discharge to [facility] IRF on 9/23/24.

## 2024-09-23 NOTE — DISCHARGE NOTE PROVIDER - CARE PROVIDER_API CALL
Libman, Richard Benjamin  Neurology  [address], [address]  [place] [address]  Phone: [phone]  Fax: [phone]  Follow Up Time: 2 weeks    Edin Neves  Cardiology  [address] [place] [address], [address]  [place] [address]  Phone: [phone]  Fax: [phone]  Follow Up Time: 2 weeks    Felice Hazel  Cardiac Electrophysiology  [address] [place] [address], [id number]  [place] [address]  Phone: [phone]  Fax: [phone]  Follow Up Time: 2 weeks   Libman, Richard Benjamin  Neurology  [address], [address]  [place] [address]  Phone: [phone]  Fax: [phone]  Follow Up Time: 2 weeks    Edin Neves  Cardiology  [address], [address]  [place] [address]  Phone: [phone]  Fax: [phone]  Follow Up Time: 2 weeks    Felice Hazel  Cardiac Electrophysiology  [address] [place] [address], [id number]  [place] [address]  Phone: [phone]  Fax: [phone]  Follow Up Time: 2 weeks    Felice Herrera  Gastroenterology  [address] [place] [address]  Phone: [phone]  Fax: [phone]  Follow Up Time: 2 weeks

## 2024-09-23 NOTE — DISCHARGE NOTE PROVIDER - HOSPITAL COURSE
HPI: 70 y/o male R handed with a PMH of HTN, HLD presents with acute onset gait difficulties, slurred speech, diplopia. LKN is 0700 am 9/18. Per patients family he was at his baseline yesterday when he dropped his daughter at the train station in the morning but then later around noon time when he went to get lunch with his wife he was experiencing some gait difficulties. She noticed him limping to the L side. During the day the patient stayed in bed mostly and did not notice gait difficulties getting worse as he was mostly resting or sleeping. At 2 AM 9/19 he was going to the bathroom and was feeling very imbalanced and unable to walk unassisted.  Patient also reports intermittent dizziness where he felt like he was spinning around. He did not have any nausea or vomiting. This morning his daughter noted that he was unable to use his phone with the L hand and was unable to walk unassisted, requiring daughter or EMS to help out. He is not on any AC/AP at home. Patient is able to normally walk by himself does not need a cane or a walker. 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 While in the ED patient initially had some sensory deficit LLE which later improved on subsequent exam. Denies a headache at this time.     Denies a prior history of stroke. Denies dysphagia. States he was a smoker for about 5 years 40 years ago but nothing recently. Does not drink alcohol. Denies illicit drug use.     Hospital Course: Patient was admitted to stroke unit for workup. MRI brain shows acute/subacute infarction within the right side of the elba  as well as left lateral genu of the corpus callosum with associated cytotoxic edema. Patient is started on aspirin 81mg daily and atorvastatin 80mg qhs daily for elevated cholesterol () for secondary stroke prevention. Patient was evaluated by cardiology and his TTE shows normal LV function. He has a loop recorder placed by EP specialist for cardiac rhythm monitoring. PT,OT  and PMR evaluated patient and recommended acute rehab. Patient is medically and neurologically stable for discharge. Patient can follow up outpatient with a stroke neurologist, a cardiologist and a EP specialist.        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      Radiology imaging:  MR Head No Cont (09.20.24 @ 08:08)     IMPRESSION: Acute/subacute infarction within the right side of the elba   as well as left lateral genu of the corpus callosum with associated   cytotoxic edema and without associated hemorrhage. Embolic etiology   should be considered given different vascular distributions.      TTE 9/23:     CONCLUSIONS:     1. Left ventricular cavity is normal in size. Left ventricular wall thickness is normal. There are no regional wall motion abnormalities seen.   2. Normal left ventricular diastolic function, with normal left ventricular filling pressure.   3. Normal right ventricular cavity size, with normal wall thickness, and normal right ventricular systolic function.   4. No pericardial effusion seen.   5. Left ventricular endocardium is not well visualized; however, the left ventricular systolic function appears grossly normal.   6. No prior echocardiogram is available for comparison.   7. There is normal LV mass and concentric remodeling.   8. Trileaflet aortic valve with normal systolic excursion. There is calcification of the aortic valve leaflets. HPI: 68 y/o male R handed with a PMH of HTN, HLD presents with acute onset gait difficulties, slurred speech, diplopia. LKN is 0700 am 9/18. Per patients family he was at his baseline yesterday when he dropped his daughter at the train station in the morning but then later around noon time when he went to get lunch with his wife he was experiencing some gait difficulties. She noticed him limping to the L side. During the day the patient stayed in bed mostly and did not notice gait difficulties getting worse as he was mostly resting or sleeping. At 2 AM 9/19 he was going to the bathroom and was feeling very imbalanced and unable to walk unassisted.  Patient also reports intermittent dizziness where he felt like he was spinning around. He did not have any nausea or vomiting. This morning his daughter noted that he was unable to use his phone with the L hand and was unable to walk unassisted, requiring daughter or EMS to help out. He is not on any AC/AP at home. Patient is able to normally walk by himself does not need a cane or a walker. 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 While in the ED patient initially had some sensory deficit LLE which later improved on subsequent exam. Denies a headache at this time.     Denies a prior history of stroke. Denies dysphagia. States he was a smoker for about 5 years 40 years ago but nothing recently. Does not drink alcohol. Denies illicit drug use.     Hospital Course: Patient was admitted to stroke unit for workup. MRI brain shows acute/subacute infarction within the right side of the elba  as well as left lateral genu of the corpus callosum with associated cytotoxic edema. Patient is started on aspirin 81mg daily and atorvastatin 80mg qhs daily for elevated cholesterol () for secondary stroke prevention. Patient was evaluated by cardiology and his TTE shows normal LV function. He has a loop recorder placed by EP specialist for cardiac rhythm monitoring. He had a CT C/A/P which shows cholelithiasis. However, patient is asymptomatic and normal LFT, he can follow up with GI specialist outpatient. PT,OT  and PMR evaluated patient and recommended acute rehab. Patient is medically and neurologically stable for discharge. Patient can follow up outpatient with a stroke neurologist, a cardiologist, a EP specialist and  GI specialist.      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      Radiology imaging:  MR Head No Cont (09.20.24 @ 08:08)     IMPRESSION: Acute/subacute infarction within the right side of the elba   as well as left lateral genu of the corpus callosum with associated   cytotoxic edema and without associated hemorrhage. Embolic etiology   should be considered given different vascular distributions.      TTE 9/23:     CONCLUSIONS:     1. Left ventricular cavity is normal in size. Left ventricular wall thickness is normal. There are no regional wall motion abnormalities seen.   2. Normal left ventricular diastolic function, with normal left ventricular filling pressure.   3. Normal right ventricular cavity size, with normal wall thickness, and normal right ventricular systolic function.   4. No pericardial effusion seen.   5. Left ventricular endocardium is not well visualized; however, the left ventricular systolic function appears grossly normal.   6. No prior echocardiogram is available for comparison.   7. There is normal LV mass and concentric remodeling.   8. Trileaflet aortic valve with normal systolic excursion. There is calcification of the aortic valve leaflets.    CT Abdomen and Pelvis w/ IV Cont (09.23.24 @ 14:00): No evidence of suspicious mass or adenopathy in the chest, abdomen, or   pelvis. Cholelithiasis with nonspecific thickening of the gallbladder wall.   Correlate with LFTs.

## 2024-09-23 NOTE — H&P ADULT - NSHPPHYSICALEXAM_GEN_ALL_CORE
Constitutional - NAD, Comfortable  Chest - good chest expansion, good respiratory effort, CTAB  Cardio - warm and well perfused, RRR, no m/r/g  Abdomen -  Soft, NTND, bowel sounds normal  Extremities - No peripheral edema, No calf tenderness   Neurologic Exam:                    Cognitive -             Orientation: Awake, A&O to self, place, date, year, situation            Attention:  Able to state months of the year backwards            Memory: Recalls 2/3 objects without cuing but does not even attempt to remember a third item even initially            Naming: Able to name objects and describe their use (pen, phone)            Thought: process, content appropriate     Speech - Fluent, Comprehensible, No dysarthria, No aphasia      Cranial Nerves - PERRL, EOMI but eyes keep "resetting" to center during testing, visual field testing normal, facial sensation intact and symmetric, mild L-sided mouth droop, hearing intact b/l but greater on R than L, shoulder shrug weak on L side as compared to right, tongue midline     Motor -                      LEFT    UE - ShAB 3/5, EF 3/5, EE 3+/5, WE 2/5,  3/5                    RIGHT UE - ShAB 5/5, EF 5/5, EE 5/5, WE 5/5,  5/5                    LEFT    LE - HF 4/5, KE 4/5, DF 2/5, PF 3/5                    RIGHT LE - HF 5/5, KE 5/5, DF 5/5, PF 5/5        Sensory - Intact to LT bilateral UE and LE     Coordination - FTN intact right side; unable to perform L side 2/2 weakness     OculoVestibular -  Eyes keep "resetting" to center during EOMI testing  Psychiatric - Mood stable, Affect WNL  Skin on admission: Loop recorder site c/d/i

## 2024-09-23 NOTE — H&P ADULT - NSHPSOCIALHISTORY_GEN_ALL_CORE
SOCIAL HISTORY  Smoking -   EtOH -   Drugs -     FUNCTIONAL HISTORY  lives with wife in private house with a few steps to enter with bilateral rails (far apart), 1 flight inside with 1 rail, right hand dominant    Prior Level of Function: Independent in ADLs and ambulation    CURRENT FUNCTIONAL STATUS  - Bed Mobility: Min A; 1PA  - Transfers: Min A; 1PA; FWB; PT in front  - Gait: Mod -Max A; 1PA; FWB; PT in front; 2 steps bed to chair  - ADLs:  -Upper Body Dressing: Mod A; 1PA  -Lower Body Dressing: Mod A; 1PA  -Eating: Min A; set up  -Grooming: Min A

## 2024-09-23 NOTE — PROGRESS NOTE ADULT - ASSESSMENT
ASSESSMENT: 70 y/o male R handed with a PMH of HTN, HLD, former smoker, presents with acute onset gait difficulties, slurred speech, diplopia. LKN is 0700 am 9/18. 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 Diffuse intracranial atherosclerotic disease seen. On exam was found to have L ataxic hemiparesis and dysarthria. 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    NEURO: Neuro exam unchanged, continue close monitoring for neurologic deterioration, permissive HTN with slow titration to normotensive, Pt started on high dose statin, titrate statin to LDL goal less than 70, MRI Brain CTA head/neck results as above. Physical therapy/OT/Speech eval with recommendations for AR     ANTITHROMBOTIC THERAPY: ASA for secondary stroke prevention    PULMONARY: protecting airway, saturating well     CARDIOVASCULAR: TTE: unremarkable, cardiac monitoring; no events. Plan to obtain LOOP to monitor for occult arrhythmias like Afib, Dr Neves (cardio) consult appreciated                              SBP goal: 110-180mmhg    GASTROINTESTINAL: Dysphagia screen  passed, tolerating diet     Diet: regular    RENAL: BUN/Cr stable, good urine output      Na Goal: Greater than 135     Del Castillo: No    HEMATOLOGY: H/H stable, Platelets normal      DVT ppx: LMWH     ID: afebrile, leukocytosis: likely reactive, will continue to monitor     OTHER: Plan/goals of care discussed with pt and pt's daughter at bedside    DISPOSITION: Acute Rehab as per  PT/OT eval once stable and workup is complete    CORE MEASURES:        Admission NIHSS: 5     TPA:  NO      LDL/HDL: 101/33     Depression Screen: 0     Statin Therapy: Y     Dysphagia Screen: PASS      Smoking  NO (Former)     Afib  NO     Stroke Education YES    Obtain screening ultrasound in patients who meet 1 or more of the following criteria as patient is high risk for DVT/PE upon admission:   [] History of DVT/PE  []Hypercoagulable states (Factor V Leiden, Cancer, OCP, etc. )  []Prolonged immobility (hemiplegia/hemiparesis/post operative or any other extended immobilization)   [] Transferred from outside facility (Rehab or Long term care)  [] Age </= to 50    Patient seen and plans discussed with neurology attending, Dr. Thomas ASSESSMENT: 70 y/o male R handed with a PMH of HTN, HLD, former smoker, presents with acute onset gait difficulties, slurred speech, diplopia. LKN is 0700 am 9/18. 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 Diffuse intracranial atherosclerotic disease seen. On exam was found to have L ataxic hemiparesis and dysarthria. 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    NEURO: Neuro exam unchanged, continue close monitoring for neurologic deterioration, permissive HTN with slow titration to normotensive, continue atorvastatin 80mg, titrate statin to LDL goal less than 70, MRI Brain CTA head/neck results as above. Physical therapy/OT/Speech eval with recommendations for AR     ANTITHROMBOTIC THERAPY: ASA for secondary stroke prevention    PULMONARY: protecting airway, saturating well     CARDIOVASCULAR: TTE: unremarkable, cardiac monitoring; no events. Plan to obtain LOOP to monitor for occult arrhythmias like Afib, Dr Neves (cardio) consult appreciated                              SBP goal: 110-180mmhg    GASTROINTESTINAL: Dysphagia screen  passed, tolerating diet. CT CAP to rule out malignancy      Diet: regular    RENAL: BUN/Cr stable, good urine output      Na Goal: Greater than 135     Del Castillo: No    HEMATOLOGY: H/H stable, Platelets normal      DVT ppx: LMWH     ID: afebrile, leukocytosis: likely reactive, will continue to monitor     OTHER: Plan/goals of care discussed with pt and pt's daughter at bedside    DISPOSITION: Acute Rehab as per  PT/OT eval once stable and workup is complete    CORE MEASURES:        Admission NIHSS: 5     TPA:  NO      LDL/HDL: 101/33     Depression Screen: 0     Statin Therapy: Y     Dysphagia Screen: PASS      Smoking  NO (Former)     Afib  NO     Stroke Education YES    Obtain screening ultrasound in patients who meet 1 or more of the following criteria as patient is high risk for DVT/PE upon admission:   [] History of DVT/PE  []Hypercoagulable states (Factor V Leiden, Cancer, OCP, etc. )  []Prolonged immobility (hemiplegia/hemiparesis/post operative or any other extended immobilization)   [] Transferred from outside facility (Rehab or Long term care)  [] Age </= to 50    Patient seen and plans discussed with neurology attending, Dr. Thomas

## 2024-09-23 NOTE — H&P ADULT - ASSESSMENT
Assessment/Plan:  MICA LE is a 68 y/o male R handed with a PMH of HTN, HLD presents to [facility] on 9/19/24 with acute onset gait difficulties, left side weakness, slurred speech, intermittent dizziness, and diplopia. LKN is 0700 am 9/18. 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 Hospital course significant for elevated LFTs, C/A/P showed cholelithiasis, Patient is asymptomatic and will f/u with outpatient GI.  Now admitted to [facility] after for initiation of a multidisciplinary rehab program consisting focused on functional mobility, transfers and ADLs.    #R pontine and L callosal infarct  -CTH (9/19)- No hydrocephalus, acute intracranial hemorrhage, mass effect, or brain edema.  -MRH (9/20)- : Acute/subacute infarction within the right side of the elba as well as left lateral genu of the corpus callosum with associated cytotoxic edema and without associated hemorrhage.   -TTE (9/20)- normal LV function  - S/P ILR for afib surveillance ?  -Aspirin 81mg daily  -Atorvastatin 80mg HS    Deficits:  acute onset gait difficulties, left side weakness, slurred speech, intermittent dizziness, and diplopia.  Comprehensive Multidisciplinary Rehab Program:  - Start comprehensive rehab program, 3 hours a day, 5 days a week.  - PT 1hr/day: Focused on improving strength, endurance, coordination, balance, functional mobility, and transfers  - OT 1hr/day: Focused on improving strength, fine motor skills, coordination, posture and ADLs.    - Speech Therapy 1hr/day: to diagnose and treat deficits in swallowing, cognition and communication.   - Activity Limitations: Decreased social, vocational and leisure activities, decreased self care and ADLs, decreased mobility, decreased ability to manage household and finances.     -----------------------------------------------------------------------------------  Concurrent Medical Problems    #Elevated LFTs  - CT C/A/P (9/23)- Cholelithiasis with nonspecific thickening of the gallbladder wall. Correlate with LFTs.    #HTN/HLD  -Losartan 100mg daily  -Atorvastatin 80mg HS    #Mood/Cognition:  Neuropsychology consult PRN    #Sleep:   Maintain quiet hours and low stim environment.  Melatonin 6mg PRN to maximize participation in therapy during the day.     #Pain Management:  Analgesic: Tylenol PRN    #GI/Bowel:  Senna QHS, Miralax PRN Daily  GI ppx: Pantoprazole 40mg    #/Bladder:   - PVR x1 on admission (SC if > 400)    #Skin/Pressure Injury:   - Skin assessment on admission: ***      #Diet/Dysphagia:  Dysphagia: SLP consult for swallow function evaluation and treatment  Current Diet: Regular- DASH   [X] Aspiration Precautions  Nutrition consult     #Precautions / PROPHYLAXIS:   - Falls  - ortho: Weight bearing status: WBAT   - Lungs: Aspiration, Incentive Spirometer   - DVT ppx: Lovenox 40mg daily  - Pressure injury/Skin: OOB to Chair, PT/OT      ---------------  Code Status: FULL  Emergency Contact:    Outpatient Follow-up (Specialty/Name of physician):    Libman, Richard Benjamin  Neurology  [address] [facility] [address]  [place] [address]  Phone: [phone]  Fax: [phone]  Follow Up Time: 2 weeks    Edin Neves  Cardiology  [address], [address]  [place] [address]  Phone: [phone]  Fax: [phone]  Follow Up Time: 2 weeks    Felice Hazel  Cardiac Electrophysiology  [address] [place] [address], [address]  [place] [address]  Phone: [phone]  Fax: [phone]  Follow Up Time: 2 weeks    Felice Herrera  Gastroenterology  [address] [place] [address]  Phone: [phone]  Fax: [phone]  Follow Up Time: 2 weeks      --------------  Goals: Safe discharge to Home*****  Estimated Length of Stay: 10-14 days  Rehab Potential: Good  Medical Prognosis: Good  Estimated Disposition: Home with Home Care  ---------------    PRESCREEN COMPARISON:  I have reviewed the prescreen information and I have found no relevant changes between the preadmission screening and my post admission evaluation.    RATIONALE FOR INPATIENT ADMISSION: Patient demonstrates the following:  [X] Medically appropriate for rehabilitation admission  [X] Has attainable rehab goals with an appropriate initial discharge plan  [X]Has rehabilitation potential (expected to make a significant improvement within a reasonable period of time)  [X] Requires close medical management by a rehab physician, rehab nursing care, Hospitalist and comprehensive interdisciplinary team (including PT, OT and/or SLP, Prosthetics and Orthotics)       MICA LE is a 68 y/o male RHD with a PMH of HTN, HLD who presents to [facility] on 9/19/24 with acute onset gait difficulties, left side weakness, slurred speech, intermittent dizziness, and diplopia secondary to R pontine and L callosal infarct. Hospital course significant for elevated LFTs/ cholelithiasis (to be managed as outpt). He is now admitted to [facility] for a multidisciplinary rehab program consisting focused on functional mobility, transfers and ADLs.    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  - CTH (9/19)- No hydrocephalus, acute intracranial hemorrhage, mass effect, or brain edema.  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   - TTE (9/20)- normal LV function  - S/P ILR for afib surveillance  - Aspirin 81mg daily  - Atorvastatin 80mg HS  - Start comprehensive rehab program, 3 hours a day, 5 days a week. OT PT SLP  - Precautions: cardiac, fall, vision, ILR    # HTN  - Losartan 100mg daily  - monitor vitals, hospitalist consult    # HLD  - Atorvastatin 80mg HS    # cholelithiasis  # Elevated LFTs  - CT C/A/P (9/23)- Cholelithiasis with nonspecific thickening of the gallbladder wall. Correlate with LFTs.  - monitor LFTs, avoid hepatotoxic meds  - outpatient GI follow up    # Sleep:   - Maintain quiet hours and low stim environment.  - Melatonin 6mg PRN to maximize participation in therapy during the day.     # Pain Management:  - Tylenol PRN    # GI/Bowel:  - Senna QHS, Miralax PRN Daily  - GI ppx: Pantoprazole 40mg    # /Bladder:   - PVR x1 on admission (SC if > 400)    # Skin/Pressure Injury:   - Skin assessment on admission: ***    # Diet  - Regular- DASH    #  DVT ppx:  -  Lovenox 40mg daily    # LABS  CBC CMP 9/24     ---------------  Code Status: FULL  Emergency Contact:    Outpatient Follow-up (Specialty/Name of physician):    Libman, Richard Benjamin  Neurology  [address] [place] [address], [address]  [place] [address]  Phone: [phone]  Fax: [phone]  Follow Up Time: 2 weeks    Edin Nevse  Cardiology  [address] [facility], [address]  [place] [address]  Phone: [phone]  Fax: [phone]  Follow Up Time: 2 weeks    Felice Hazel  Cardiac Electrophysiology  [address] [place] [address], [address]  [place] [address]  Phone: [phone]  Fax: [phone]  Follow Up Time: 2 weeks    Felice Herrera  Gastroenterology  [address] [place] [address]  Phone: [phone]  Fax: [phone]  Follow Up Time: 2 weeks       MICA LE is a 70 y/o male RHD with a PMH of HTN, HLD who presents to [facility] on 9/19/24 with acute onset gait difficulties, left side weakness, slurred speech, intermittent dizziness, and diplopia secondary to R pontine and L callosal infarct. Hospital course significant for elevated LFTs/ cholelithiasis (to be managed as outpt). He is now admitted to [facility] for a multidisciplinary rehab program consisting focused on functional mobility, transfers and ADLs.    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  - CTH (9/19)- No hydrocephalus, acute intracranial hemorrhage, mass effect, or brain edema.  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   - TTE (9/20)- normal LV function  - S/P ILR for afib surveillance  - Aspirin 81mg daily  - Atorvastatin 80mg HS  - Start comprehensive rehab program, 3 hours a day, 5 days a week. OT PT SLP  - Precautions: cardiac, fall, vision, ILR    # HTN  - Losartan 100mg daily  - monitor vitals, hospitalist consult    # HLD  - Atorvastatin 80mg HS    # cholelithiasis  # Elevated LFTs  - CT C/A/P (9/23)- Cholelithiasis with nonspecific thickening of the gallbladder wall. Correlate with LFTs.  - monitor LFTs, avoid hepatotoxic meds  - outpatient GI follow up    # Sleep:   - Maintain quiet hours and low stim environment.  - Melatonin 6mg PRN to maximize participation in therapy during the day.     # Pain Management:  - Tylenol PRN    # GI/Bowel:  - Senna QHS, Miralax PRN Daily  - GI ppx: Pantoprazole 40mg    # /Bladder:   - PVR x1 on admission (SC if > 400)    # Skin/Pressure Injury:   - Skin assessment on admission: loop recorder site c/d/i    # Diet  - Regular- DASH    #  DVT ppx:  -  Lovenox 40mg daily    # LABS  CBC CMP 9/24     ---------------  Code Status: FULL  Emergency Contact:    Outpatient Follow-up (Specialty/Name of physician):    Libman, Richard Benjamin  Neurology  [address] [facility], [address]  [place] [address]  Phone: [phone]  Fax: [phone]  Follow Up Time: 2 weeks    Edin Neves  Cardiology  [address] [facility], [address]  [place] [address]  Phone: [phone]  Fax: [phone]  Follow Up Time: 2 weeks    Felice Hazel  Cardiac Electrophysiology  [address] [place] [address], [address]  [place] [address]  Phone: [phone]  Fax: [phone]  Follow Up Time: 2 weeks    Felice Herrera  Gastroenterology  [address] [place] [address]  Phone: [phone]  Fax: [phone]  Follow Up Time: 2 weeks MICA LE is a 68 y/o male RHD with a PMH of HTN, HLD who presents to [facility] on 9/19/24 with acute onset gait difficulties, left side weakness, slurred speech, intermittent dizziness, and diplopia secondary to R pontine and L callosal infarct. Hospital course significant for elevated LFTs/ cholelithiasis (to be managed as outpt). He is now admitted to [facility] for a multidisciplinary rehab program for functional mobility, transfers and ADLs.    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  - CTH (9/19)- No hydrocephalus, acute intracranial hemorrhage, mass effect, or brain edema.  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   - TTE (9/20)- normal LV function  - S/P ILR for afib surveillance  - Aspirin 81mg daily  - Atorvastatin 80mg HS  - Start comprehensive rehab program, 3 hours a day, 5 days a week. OT PT SLP  - recreation therapy and psychology consult  - Precautions: cardiac, fall, vision, ILR    # HTN  - Losartan 100mg daily  - monitor vitals, hospitalist consult    # HLD  - Atorvastatin 80mg HS    # cholelithiasis  # Elevated LFTs  - CT C/A/P (9/23)- Cholelithiasis with nonspecific thickening of the gallbladder wall. Correlate with LFTs.  - monitor LFTs, avoid hepatotoxic meds  - outpatient GI follow up    # Sleep:   - Maintain quiet hours and low stim environment.  - Melatonin 6mg PRN to maximize participation in therapy during the day.     # Pain Management:  - Tylenol PRN    # GI/Bowel:  - Senna QHS, Miralax PRN Daily  - GI ppx: Pantoprazole 40mg    # /Bladder:   - PVR x1 on admission (SC if > 400)    # Skin/Pressure Injury:   - Skin assessment on admission: loop recorder site c/d/i    # Diet  - Regular- DASH    #  DVT ppx:  -  Lovenox 40mg daily    # LABS  CBC CMP 9/24     ---------------  Code Status: FULL  Emergency Contact: SON: EDILMA [phone]    Outpatient Follow-up (Specialty/Name of physician):    Libman, Richard Benjamin  Neurology  [address] [facility], [address]  [place] [address]  Phone: [phone]  Fax: [phone]  Follow Up Time: 2 weeks    Edin Neves  Cardiology  [address] [facility], [address]  [place] [address]  Phone: [phone]  Fax: [phone]  Follow Up Time: 2 weeks    Felice Hazel  Cardiac Electrophysiology  [address] [place] [address], [address]  [place] [address]  Phone: [phone]  Fax: [phone]  Follow Up Time: 2 weeks    Felice Herrera  Gastroenterology  [address] [place] [address]  Phone: [phone]  Fax: [phone]  Follow Up Time: 2 weeks

## 2024-09-23 NOTE — PROGRESS NOTE ADULT - PROBLEM SELECTOR PLAN 1
Acute/subacute infarction within the right side of the elba   as well as left lateral genu of the corpus callosum with associated   cytotoxic edema and without associated hemorrhage. Suspicious for embolic etiology  given different vascular distributions.  Check TTE   Monitor on Tele  ASA
Acute/subacute infarction within the right side of the elba   as well as left lateral genu of the corpus callosum with associated   cytotoxic edema and without associated hemorrhage. Suspicious for embolic etiology  given different vascular distributions.  reviewed TTE   Monitor on Tele  ASA
Acute/subacute infarction within the right side of the elba   as well as left lateral genu of the corpus callosum with associated   cytotoxic edema and without associated hemorrhage. Suspicious for embolic etiology  given different vascular distributions.  Check TTE   Monitor on Tele  ASA

## 2024-09-23 NOTE — H&P ADULT - PATIENT'S PREFERRED PRONOUN
Jr Villeda, daughter, is requesting a referral to Dr. Treasure Villareal at [facility] in [place] for patient's spinal stenosis and pain. Please contact Jr Villeda when referral has been completed. Thanks. [address] [place] [address], [address] [place] [address]  Phone [phone]  Fax [phone]  [facility] PROVIDER ID [id number]    Jr Villeda: [phone]      FYI: Patient last seen by Dr. Neymar Cool. I have informed Jr Villeda of protocol in regards to Dr. Tashi Wolff' patients. Him/He

## 2024-09-23 NOTE — PROGRESS NOTE ADULT - SUBJECTIVE AND OBJECTIVE BOX
THE PATIENT WAS SEEN AND EXAMINED BY ME WITH THE HOUSESTAFF AND STROKE TEAM DURING MORNING ROUNDS.   HPI:    70 y/o male R handed with a PMH of HTN, HLD presents with acute onset gait difficulties, slurred speech, diplopia. LKN is 0700 am 9/18. Per patients family he was at his baseline on 09/18/24 when he [place] at the train station in the morning but then later around noon time when he went to get lunch with his wife he was experiencing some gait difficulties. She noticed him limping to the L side. During the day the patient stayed in bed mostly and did not notice gait difficulties getting worse as he was mostly resting or sleeping. At 2 AM 9/19 he was going to the bathroom and was feeling very imbalanced and unable to walk unassisted.  Patient also reports intermittent dizziness where he felt like he was spinning around. He did not have any nausea or vomiting. On 09/19/24 his daughter noted that he was unable to use his phone with the L hand and was unable to walk unassisted, requiring daughter or EMS to help out. He is not on any AC/AP at home. Patient is able to normally walk by himself does not need a cane or a walker. 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 While in the ED patient initially had some sensory deficit LLE which later improved on subsequent exam. Denies a headache at this time.     Denies a prior history of stroke. Denies dysphagia. States he was a smoker for about 5 years 40 years ago but nothing recently. Does not drink alcohol. Denies illicit drug use    SUBJECTIVE: No events overnight.  No new neurologic complaints, ROS reported negative unless otherwise noted.      MEDICATIONS  (STANDING):  aspirin  chewable 81 milliGRAM(s) Oral daily  atorvastatin 80 milliGRAM(s) Oral at bedtime  enoxaparin Injectable 40 milliGRAM(s) SubCutaneous every 24 hours  losartan 100 milliGRAM(s) Oral daily  pantoprazole    Tablet 40 milliGRAM(s) Oral before breakfast    MEDICATIONS  (PRN):        PHYSICAL EXAM:   Vital Signs Last 24 Hrs  T(C): 36.8 (09-23-24 @ 05:53), Max: 36.9 (09-22-24 @ 12:40)  T(F): 98.2 (09-23-24 @ 05:53), Max: 98.4 (09-22-24 @ 12:40)  HR: 60 (09-23-24 @ 05:53) (55 - 71)  BP: 105/65 (09-23-24 @ 05:53) (105/65 - 128/77)  RR: 18 (09-23-24 @ 05:53) (18 - 18)  SpO2: 97% (09-23-24 @ 05:53) (95% - 97%)  Wt(kg): --  Patient On (Oxygen Delivery Method): room air        General: No acute distress  HEENT: EOM intact, visual fields full  Abdomen: Soft, nontender, nondistended   Extremities: No edema    NEUROLOGICAL EXAM:  Mental status: Eyes open, awake, alert, oriented x3, fluent speech, no neglect, able to follow commands   Cranial Nerves: No facial asymmetry, no nystagmus, moderate dysarthria,   Motor exam: Normal tone, RUE 5/5, RLE 5/5, Left hemiparesis  LUE prox 3/5, distally 2/5,  2/5, LLE drift 4+/5  Sensation: Intact to light touch   Coordination/ Gait: No dysmetria,          LABS:  cret                        13.6   6.70  )-----------( 250      ( 23 Sep 2024 06:38 )             43.0     09-23    141  |  105  |  15  ----------------------------<  103[H]  3.6   |  20[L]  |  1.17    Ca    9.3      23 Sep 2024 06:43  Phos  3.5     09-21  Mg     2.3     09-21    TPro  7.0  /  Alb  3.9  /  TBili  0.5  /  DBili  x   /  AST  18  /  ALT  32  /  AlkPhos  74  09-23        IMAGING: Reviewed by me.     Brain MRI 09/20/24: Acute/subacute infarction within the right side of the elba as well as left lateral genu of the corpus callosum with associated cytotoxic edema and without associated hemorrhage.     09/19/24;  CT brain: No hydrocephalus, acute intracranial hemorrhage, mass effect, or brain edema.    CT brain perfusion: No significant technical limitation. Cerebral blood flow less than 30% = 0 mL. Tmax greater than 6 seconds = 18 mL and involves the bilateral posterior cerebellar hemispheres and posterior vermis. Mismatch volume: 18 mL. Mismatch ratio: Infinite    CTA brain: Left vertebral artery demonstrates calcified plaque and moderate short  segment stenosis at its mid segment. Normal flow-related enhancement proximal and distal to this level. Left PICA not well visualized. No vascular aneurysm. No AVM. Venous system is well opacified, no evidence for venous sinus or cortical vein thrombosis.    CTA neck: No flow-limiting stenosis, evidence for arterial dissection, or vascular aneurysm.   THE PATIENT WAS SEEN AND EXAMINED BY ME WITH THE HOUSESTAFF AND STROKE TEAM DURING MORNING ROUNDS.   HPI:    70 y/o male R handed with a PMH of HTN, HLD presents with acute onset gait difficulties, slurred speech, diplopia. LKN is 0700 am 9/18. Per patients family he was at his baseline on 09/18/24 when he dropped his daughter at the train station in the morning but then later around noon time when he went to get lunch with his wife he was experiencing some gait difficulties. She noticed him limping to the L side. During the day the patient stayed in bed mostly and did not notice gait difficulties getting worse as he was mostly resting or sleeping. At 2 AM 9/19 he was going to the bathroom and was feeling very imbalanced and unable to walk unassisted.  Patient also reports intermittent dizziness where he felt like he was spinning around. He did not have any nausea or vomiting. On 09/19/24 his daughter noted that he was unable to use his phone with the L hand and was unable to walk unassisted, requiring daughter or EMS to help out. He is not on any AC/AP at home. Patient is able to normally walk by himself does not need a cane or a walker. 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 While in the ED patient initially had some sensory deficit LLE which later improved on subsequent exam. Denies a headache at this time.     Denies a prior history of stroke. Denies dysphagia. States he was a smoker for about 5 years 40 years ago but nothing recently. Does not drink alcohol. Denies illicit drug use    SUBJECTIVE: No events overnight.  No new neurologic complaints, ROS reported negative unless otherwise noted.      MEDICATIONS  (STANDING):  aspirin  chewable 81 milliGRAM(s) Oral daily  atorvastatin 80 milliGRAM(s) Oral at bedtime  enoxaparin Injectable 40 milliGRAM(s) SubCutaneous every 24 hours  losartan 100 milliGRAM(s) Oral daily  pantoprazole    Tablet 40 milliGRAM(s) Oral before breakfast    MEDICATIONS  (PRN):        PHYSICAL EXAM:   Vital Signs Last 24 Hrs  T(C): 36.8 (09-23-24 @ 05:53), Max: 36.9 (09-22-24 @ 12:40)  T(F): 98.2 (09-23-24 @ 05:53), Max: 98.4 (09-22-24 @ 12:40)  HR: 60 (09-23-24 @ 05:53) (55 - 71)  BP: 105/65 (09-23-24 @ 05:53) (105/65 - 128/77)  RR: 18 (09-23-24 @ 05:53) (18 - 18)  SpO2: 97% (09-23-24 @ 05:53) (95% - 97%)  Wt(kg): --  Patient On (Oxygen Delivery Method): room air        General: No acute distress  HEENT: EOM intact, visual fields full  Abdomen: Soft, nontender, nondistended   Extremities: No edema    NEUROLOGICAL EXAM:  Mental status: Eyes open, awake, alert, oriented x3, fluent speech, no neglect, able to follow commands   Cranial Nerves: No facial asymmetry, no nystagmus, moderate dysarthria,   Motor exam: Normal tone, RUE 5/5, RLE 5/5, Left hemiparesis  LUE prox 3/5, distally 2/5,  2/5, LLE drift 4+/5  Sensation: Intact to light touch   Coordination/ Gait: No dysmetria        LABS:  cret                        13.6   6.70  )-----------( 250      ( 23 Sep 2024 06:38 )             43.0     09-23    141  |  105  |  15  ----------------------------<  103[H]  3.6   |  20[L]  |  1.17    Ca    9.3      23 Sep 2024 06:43  Phos  3.5     09-21  Mg     2.3     09-21    TPro  7.0  /  Alb  3.9  /  TBili  0.5  /  DBili  x   /  AST  18  /  ALT  32  /  AlkPhos  74  09-23        IMAGING: Reviewed by me.     Brain MRI 09/20/24: Acute/subacute infarction within the right side of the elba as well as left lateral genu of the corpus callosum with associated cytotoxic edema and without associated hemorrhage.     09/19/24;  CT brain: No hydrocephalus, acute intracranial hemorrhage, mass effect, or brain edema.    CT brain perfusion: No significant technical limitation. Cerebral blood flow less than 30% = 0 mL. Tmax greater than 6 seconds = 18 mL and involves the bilateral posterior cerebellar hemispheres and posterior vermis. Mismatch volume: 18 mL. Mismatch ratio: Infinite    CTA brain: Left vertebral artery demonstrates calcified plaque and moderate short  segment stenosis at its mid segment. Normal flow-related enhancement proximal and distal to this level. Left PICA not well visualized. No vascular aneurysm. No AVM. Venous system is well opacified, no evidence for venous sinus or cortical vein thrombosis.    CTA neck: No flow-limiting stenosis, evidence for arterial dissection, or vascular aneurysm.

## 2024-09-23 NOTE — DISCHARGE NOTE PROVIDER - NSDCCPCAREPLAN_GEN_ALL_CORE_FT
PRINCIPAL DISCHARGE DIAGNOSIS  Diagnosis: Stroke  Assessment and Plan of Treatment: You were admitted to stroke unit for workup. Your MRI brain shows acute/subacute infarction within the right side of the elba  as well as left lateral genu of the corpus callosum with associated cytotoxic edema. You were started on aspirin 81mg daily and atorvastatin 80mg qhs daily for elevated cholesterol () for secondary stroke prevention. You were evaluated by cardiology and his TTE shows normal LV function. You have a loop recorder placed by EP specialist for cardiac rhythm monitoring. PT,OT  and PMR evaluated patient and recommended acute rehab. You are medically and neurologically stable for discharge. Please continue to take aspirin and atorvastatin. You can follow up outpatient with a stroke neurologist, a cardiologist and a EP specialist.          PRINCIPAL DISCHARGE DIAGNOSIS  Diagnosis: Stroke  Assessment and Plan of Treatment: You were admitted to stroke unit for workup. Your MRI brain shows acute/subacute infarction within the right side of the elba  as well as left lateral genu of the corpus callosum with associated cytotoxic edema. You were started on aspirin 81mg daily and atorvastatin 80mg qhs daily for elevated cholesterol () for secondary stroke prevention. You were evaluated by cardiology and his TTE shows normal LV function. You have a loop recorder placed by EP specialist for cardiac rhythm monitoring. PT,OT  and PMR evaluated patient and recommended acute rehab. You are medically and neurologically stable for discharge. Please continue to take aspirin and atorvastatin. You can follow up outpatient with a stroke neurologist, a cardiologist and a EP specialist.           SECONDARY DISCHARGE DIAGNOSES  Diagnosis: Asymptomatic cholelithiasis  Assessment and Plan of Treatment: You CT chest/abdomen/pelvis shows cholelithiasis. However, you denies any abdominal discomfort/pain or symptoms. You LFT are within normal level. Please follow up with a GI specialist, Dr. Herrera outpatient for further management.

## 2024-09-23 NOTE — DISCHARGE NOTE NURSING/CASE MANAGEMENT/SOCIAL WORK - PATIENT PORTAL LINK FT
You can access the FollowMyHealth Patient Portal offered by [facility] by registering at the following website: [email or web address]. By joining [facility]’s FollowMyHealth portal, you will also be able to view your health information using other applications (apps) compatible with our system.

## 2024-09-23 NOTE — CONSULT NOTE ADULT - SUBJECTIVE AND OBJECTIVE BOX
EP Attending  HISTORY OF PRESENT ILLNESS: HPI:    Speaks only MANDARIN.  H+P and informed consent w/ assistance of Mandarin video translation.  HPI: 68 y/o male R handed with a PMH of HTN, HLD presents with acute onset gait difficulties, slurred speech, diplopia. LKN is 0700 am 9/18. Per patients family he was at his baseline yesterday when he dropped his daughter at the train station in the morning but then later around noon time when he went to get lunch with his wife he was experiencing some gait difficulties. She noticed him limping to the L side. During the day the patient stayed in bed mostly and did not notice gait difficulties getting worse as he was mostly resting or sleeping. At 2 AM 9/19 he was going to the bathroom and was feeling very imbalanced and unable to walk unassisted.  Patient also reports intermittent dizziness where he felt like he was spinning around. He did not have any nausea or vomiting. This morning his daughter noted that he was unable to use his phone with the L hand and was unable to walk unassisted, requiring daughter or EMS to help out. He is not on any AC/AP at home. Patient is able to normally walk by himself does not need a cane or a walker. 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 While in the ED patient initially had some sensory deficit LLE which later improved on subsequent exam. Denies a headache at this time.   Denies a prior history of stroke. Denies dysphagia. States he was a smoker for about 5 years 40 years ago but nothing recently. Does not drink alcohol. Denies illicit drug use.     No prior history of palpitations or fainting.  A 10 pt ROS is otherwise negative.    PAST MEDICAL & SURGICAL HISTORY:  Hypertriglyceridemia  HTN (hypertension)  Fracture  olecrenon process  S/P inguinal hernia repair  bilateral    MEDICATIONS  (STANDING):  aspirin  chewable 81 milliGRAM(s) Oral daily  atorvastatin 80 milliGRAM(s) Oral at bedtime  enoxaparin Injectable 40 milliGRAM(s) SubCutaneous every 24 hours  losartan 100 milliGRAM(s) Oral daily  pantoprazole    Tablet 40 milliGRAM(s) Oral before breakfast      Allergies    dust (Sneezing)  No Known Drug Allergies  grass (Sneezing)    Intolerances    FAMILY HISTORY:    Non-contributary for premature coronary disease or sudden cardiac death    [x] Non-smoker  [ ] Smoker  [ ] Alcohol    PHYSICAL EXAM:  T(C): 37.1 (09-23-24 @ 13:07), Max: 37.1 (09-23-24 @ 13:07)  HR: 72 (09-23-24 @ 13:07) (55 - 72)  BP: 121/75 (09-23-24 @ 13:07) (105/65 - 126/76)  RR: 18 (09-23-24 @ 13:07) (18 - 18)  SpO2: 97% (09-23-24 @ 13:07) (95% - 97%)  Wt(kg): --    Appearance: Normal appearing adult male in no acute distress	  HEENT:   Normal oral mucosa, PERRL, EOMI	  Lymphatic: No lymphadenopathy , no edema  Cardiovascular: Normal S1 S2, No JVD, No murmurs , Peripheral pulses palpable 2+ bilaterally  Respiratory: Lungs clear to auscultation, normal effort 	  Gastrointestinal:  Soft, Non-tender, + BS	  Skin: No rashes, No ecchymoses, No cyanosis, warm to touch  Musculoskeletal: Normal range of motion, normal strength  Psychiatry:  Mood & affect appropriate      TELEMETRY:NSR 	    ECG:  NSR  Echo:  < from: TTE Limited W or WO Ultrasound Enhancing Agent (09.20.24 @ 10:36) >  CONCLUSIONS:      1. Left ventricular cavity is normal in size. Left ventricular wall thickness is normal. There are no regional wall motion abnormalities seen.   2. Normal left ventricular diastolic function, with normal left ventricular filling pressure.   3. Normal right ventricular cavity size, with normal wall thickness, and normal right ventricular systolic function.   4. No pericardial effusion seen.   5. Left ventricular endocardium is not well visualized; however, the left ventricular systolic function appears grossly normal.   6. No prior echocardiogram is available for comparison.   7. There is normal LV mass and concentric remodeling.   8. Trileaflet aortic valve with normal systolic excursion. There is calcification of the aortic valve leaflets.    < end of copied text >    < from: MR Head No Cont (09.20.24 @ 08:08) >  IMPRESSION: Acute/subacute infarction within the right side of the elba   as well as left lateral genu of the corpus callosum with associated   cytotoxic edema and without associated hemorrhage. Embolic etiology   should be considered given different vascular distributions.      < end of copied text >    < from: CT Angio Neck Stroke Protocol w/ IV Cont (09.19.24 @ 14:58) >  CT brain:  No hydrocephalus, acute intracranial hemorrhage, mass effect, or brain   edema.    CT brain perfusion:  No significant technical limitation.    Cerebral blood flow less than 30% = 0 mL.    Tmax greater than 6 seconds = 18 mL and involves the bilateral posterior   cerebellar hemispheres and posterior vermis.    Mismatch volume: 18 mL  Mismatch ratio: Infinite    CTA brain:  Left vertebral artery demonstrates calcified plaque and moderate short   segment stenosis at its mid segment. Normal flow-related enhancement   proximal and distal to this level.    Left PICA not well visualized.  No vascular aneurysm. No AVM.    Venous system is well opacified, no evidence for venous sinus or cortical   vein thrombosis.    CTA neck:  No flow-limiting stenosis, evidence for arterial dissection, or vascular   aneurysm.    < end of copied text >    	  LABS:	 	                          13.6   6.70  )-----------( 250      ( 23 Sep 2024 06:38 )             43.0     09-23    141  |  105  |  15  ----------------------------<  103[H]  3.6   |  20[L]  |  1.17    Ca    9.3      23 Sep 2024 06:43    TPro  7.0  /  Alb  3.9  /  TBili  0.5  /  DBili  x   /  AST  18  /  ALT  32  /  AlkPhos  74  09-23    ASSESSMENT/PLAN: Mr Lopes is a very pleasant Mandarin-speaking 69y Male here with new stroke.  He has a history of HTN. No diabetes, no hyperlipidemia, and no prior strokes.  His echocardiogram is reassuring, normal EF, no patent foramen ovale.  Normal head and neck vasculature.  Neurology note suggests that his infarct may be embolic due to the multiple locations of infarcts, and contributory arrhythmias should be ruled out.  He is referred for a loop recorder specifically for surveillance for paroxysmal atrial fibrillation after an embolic stroke of unknown source.  A repeat stroke from AFib has a high likelihood of being disabling or fatal, and identifying this arrhythmia would change medical management by allowing us to start oral anticoagulation (which he is willing to take).  There is less than 1% risk of infection for this bedside minor surgery.  He prefers this pathway over an event monitor, which would require daily wear for ~30 days at a time.  We will return to bedside later to proceed with implant, see separate operative report.  Afterwards OK for discharge immediately.  Wound check with Dr Neves.  Continue aspirin and losartan for secondary prevention of stroke.    Felice Hazel M.D.  Cardiac Electrophysiology    office [phone]  pager [phone]

## 2024-09-23 NOTE — PROGRESS NOTE ADULT - ASSESSMENT
70 y/o male R handed with a PMH of HTN, HLD presents with acute onset gait difficulties, slurred speech, diplopia. LKN is 0700 am 9/18. 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 Diffuse intracranial atherosclerotic disease seen.         LKN: 0700 9/18  PreMRS: 0  NIHSS: 5 (FD, LUE drift, LLE drift, dysarthria, LLE dystaxia)      Not a tenecteplase candidate as patient is outside of the appropriate window  Not a thrombectomy candidate as no LVO is seen

## 2024-09-23 NOTE — DISCHARGE NOTE PROVIDER - CARE PROVIDERS DIRECT ADDRESSES
,[email or web address],DirectAddress_Unknown,DirectAddress_Unknown ,[email or web address],DirectAddress_Unknown,DirectAddress_Unknown,DirectAddress_Unknown

## 2024-09-23 NOTE — DISCHARGE NOTE PROVIDER - PROVIDER TOKENS
PROVIDER:[TOKEN:[[id number]],FOLLOWUP:[2 weeks]],PROVIDER:[TOKEN:[[id number]],FOLLOWUP:[2 weeks]],PROVIDER:[TOKEN:[[id number]],FOLLOWUP:[2 weeks]] PROVIDER:[TOKEN:[[id number]],FOLLOWUP:[2 weeks]],PROVIDER:[TOKEN:[[id number]],FOLLOWUP:[2 weeks]],PROVIDER:[TOKEN:[[id number]],FOLLOWUP:[2 weeks]],PROVIDER:[TOKEN:[[id number]],FOLLOWUP:[2 weeks]]

## 2024-09-23 NOTE — PATIENT PROFILE ADULT - FALL HARM RISK - HARM RISK INTERVENTIONS

## 2024-09-23 NOTE — DISCHARGE NOTE PROVIDER - NSDCMRMEDTOKEN_GEN_ALL_CORE_FT
gemfibrozil 600 mg oral tablet: 1 tab(s) orally 2 times a day  losartan: 1 tab(s) orally 2 times a day  omeprazole 20 mg oral delayed release capsule: 1 cap(s) orally once a day   aspirin 81 mg oral tablet, chewable: 1 tab(s) orally once a day  atorvastatin 80 mg oral tablet: 1 tab(s) orally once a day (at bedtime)  losartan 100 mg oral tablet: 1 tab(s) orally once a day  pantoprazole 40 mg oral delayed release tablet: 1 tab(s) orally once a day (before a meal)

## 2024-09-23 NOTE — H&P ADULT - ATTENDING COMMENTS
Progress note amended to include my discussions with patient, resident, hospitalist, RN, SW, PT and my findings    Patient was seen with wife and SLP initially, and then another time at bedside. Mandarin language line  Matilde #[id number] was used for questions/exam. He understands some English, and at times will answer in English or answer a question without waiting for translation, although he appears not to always comprehend even mod complex information in English.    Patient is 70 y/o male RHD with a PMH of HTN, HLD who presents [facility]/19[facility]24 with acute onset gait difficulties, left side weakness, slurred speech, intermittent dizziness, and diplopia secondary to R pontine and L callosal infarct. Hospital course significant for elevated LFTs/ cholelithiasis (to be managed as outpt). He is aware that he has a stroke, and that his left side is not as strong. He also reports some hoarseness in his voice/change in voice, and difficulties with eating/his teeth, although denies coughing/difficulty swallowing. He feels when he's using his eyes he's sometimes "tired" although denies blurry or double vision when asked specifically (noted to have diplopia at transferring hospital). He denies difficulty reading. He confirms he's right hand dominant. He maintains arousal throughout exam and can follow one- and two- step commands, although benefits from cues for 2 step.     +left facial droop on exam with dysarthria. Word finding for simple conversational speech appears intact. EOMI, with no nystagmust. +PERRL. no tongue deviation. Mild hoarseness voice, no cough, no excess secretions noted. r    RIGHT UE and LE motor tone WNL, no tremors. Motor 5/right shoulder abduciton, elbow flexion and extension, gross grasp. right HF, quad and ankle PF and DF 5/5. left HF 3-/5 elbow flexion 3/5 gross grasp 3-/5. left HF 4-/5 quad 4/5 ham 4-/5 ankle PF 4-/5 DF 3.5. No calf swelling or pedal edema. +reduced sensation left UE to LT, but denies sensory loss either side face or LE. unable to perform finger to nose coordination due to weakness on left, but impaired left heel to shin    LAbs and vitals reviewed, stable. Will add recreation therapy and psychology eval. Therapy intensity adjusted following today's evaluation, 90 min PT, 30 SLP and 60 OT.      RECENT LABS    Vital Signs Last 24 Hrs  T(C): 36.4 (24 Sep 2024 07:44), Max: 36.7 (23 Sep 2024 16:46)  T(F): 97.6 (24 Sep 2024 07:44), Max: 98 (23 Sep 2024 16:46)  HR: 57 (24 Sep 2024 07:44) (55 - 61)  BP: 136/86 (24 Sep 2024 07:44) (119/70 - 136/86)  BP(mean): --  RR: 16 (24 Sep 2024 07:44) (15 - 18)  SpO2: 96% (24 Sep 2024 07:44) (94% - 97%)    Parameters below as of 24 Sep 2024 07:44  Patient On (Oxygen Delivery Method): room air                              14.0   6.78  )-----------( 254      ( 24 Sep 2024 06:22 )             42.6     09-24    140  |  103  |  14  ----------------------------<  113[H]  3.5   |  29  |  1.02    Ca    9.2      24 Sep 2024 06:22    TPro  7.2  /  Alb  3.6  /  TBili  1.0  /  DBili  x   /  AST  19  /  ALT  40  /  AlkPhos  81  09-24      Urinalysis Basic - ( 24 Sep 2024 06:22 )    Color: x / Appearance: x / SG: x / pH: x  Gluc: 113 mg/dL / Ketone: x  / Bili: x / Urobili: x   Blood: x / Protein: x / Nitrite: x   Leuk Esterase: x / RBC: x / WBC x   Sq Epi: x / Non Sq Epi: x / Bacteria: x      CAPILLARY BLOOD GLUCOSE

## 2024-09-23 NOTE — H&P ADULT - NSHPLABSRESULTS_GEN_ALL_CORE
Negative culture, no further action Labs             13.6   6.70  )-----------( 250      ( 23 Sep 2024 06:38 )             43.0     09-23    141  |  105  |  15  ----------------------------<  103[H]  3.6   |  20[L]  |  1.17    Ca    9.3      23 Sep 2024 06:43    TPro  7.0  /  Alb  3.9  /  TBili  0.5  /  DBili  x   /  AST  18  /  ALT  32  /  AlkPhos  74  09-23    LIVER FUNCTIONS - ( 23 Sep 2024 06:43 )  Alb: 3.9 g/dL / Pro: 7.0 g/dL / ALK PHOS: 74 U/L / ALT: 32 U/L / AST: 18 U/L / GGT: x           Urinalysis Basic - ( 23 Sep 2024 06:43 )    Color: x / Appearance: x / SG: x / pH: x  Gluc: 103 mg/dL / Ketone: x  / Bili: x / Urobili: x   Blood: x / Protein: x / Nitrite: x   Leuk Esterase: x / RBC: x / WBC x   Sq Epi: x / Non Sq Epi: x / Bacteria: x    < from: CT Brain Stroke Protocol (09.19.24 @ 14:57) >    IMPRESSION:    CT brain:  No hydrocephalus, acute intracranial hemorrhage, mass effect, or brain   edema.    CT brain perfusion:  No significant technical limitation.    Cerebral blood flow less than 30% = 0 mL.    Tmax greater than 6 seconds = 18 mL and involves the bilateral posterior   cerebellar hemispheres and posterior vermis.    Mismatch volume: 18 mL  Mismatch ratio: Infinite    CTA brain:  Left vertebral artery demonstrates calcified plaque and moderate short   segment stenosis at its mid segment. Normal flow-related enhancement   proximal and distal to this level.    Left PICA not well visualized.  No vascular aneurysm. No AVM.    Venous system is well opacified, no evidence for venous sinus or cortical   vein thrombosis.    CTA neck:  No flow-limiting stenosis, evidence for arterial dissection, or vascular   aneurysm.    < from: MR Head No Cont (09.20.24 @ 08:08) >    IMPRESSION: Acute/subacute infarction within the right side of the elba   as well as left lateral genu of the corpus callosum with associated   cytotoxic edema and without associated hemorrhage. Embolic etiology   should be considered given different vascular distributions.    < from: CT Chest w/ IV Cont (09.23.24 @ 13:52) >    IMPRESSION:  No evidence of suspicious mass or adenopathy in the chest, abdomen, or   pelvis.    Cholelithiasis with nonspecific thickening of the gallbladder wall.   Correlate with LFTs.    < from: CT Abdomen and Pelvis w/ IV Cont (09.23.24 @ 14:00) >    IMPRESSION:  No evidence of suspicious mass or adenopathy in the chest, abdomen, or   pelvis.    Cholelithiasis with nonspecific thickening of the gallbladder wall.   Correlate with LFTs.

## 2024-09-23 NOTE — PROGRESS NOTE ADULT - SUBJECTIVE AND OBJECTIVE BOX
Subjective: Patient seen and examined. No new events except as noted.     REVIEW OF SYSTEMS:    CONSTITUTIONAL: + weakness, fevers or chills  EYES/ENT: No visual changes;  No vertigo or throat pain   NECK: No pain or stiffness  RESPIRATORY: No cough, wheezing, hemoptysis; No shortness of breath  CARDIOVASCULAR: No chest pain or palpitations  GASTROINTESTINAL: No abdominal or epigastric pain. No nausea, vomiting, or hematemesis; No diarrhea or constipation. No melena or hematochezia.  GENITOURINARY: No dysuria, frequency or hematuria  NEUROLOGICAL: No numbness or weakness  SKIN: No itching, burning, rashes, or lesions   All other review of systems is negative unless indicated above.    MEDICATIONS:  MEDICATIONS  (STANDING):  aspirin  chewable 81 milliGRAM(s) Oral daily  atorvastatin 80 milliGRAM(s) Oral at bedtime  enoxaparin Injectable 40 milliGRAM(s) SubCutaneous every 24 hours  losartan 100 milliGRAM(s) Oral daily  pantoprazole    Tablet 40 milliGRAM(s) Oral before breakfast      PHYSICAL EXAM:  T(C): 36.4 (09-23-24 @ 09:17), Max: 36.9 (09-22-24 @ 12:40)  HR: 66 (09-23-24 @ 09:17) (55 - 71)  BP: 126/76 (09-23-24 @ 09:17) (105/65 - 126/76)  RR: 18 (09-23-24 @ 09:17) (18 - 18)  SpO2: 96% (09-23-24 @ 09:17) (95% - 97%)  Wt(kg): --  I&O's Summary    22 Sep 2024 07:01  -  23 Sep 2024 07:00  --------------------------------------------------------  IN: 620 mL / OUT: 0 mL / NET: 620 mL    23 Sep 2024 07:01  -  23 Sep 2024 09:49  --------------------------------------------------------  IN: 240 mL / OUT: 0 mL / NET: 240 mL            Appearance: NAD  HEENT:   Dry  oral mucosa, PERRL, EOMI	  Lymphatic: No lymphadenopathy  Cardiovascular: Normal S1 S2, No JVD, No murmurs, No edema  Respiratory: Lungs clear to auscultation	  Psychiatry: A & O x 3, lethargic  Gastrointestinal:  Soft, Non-tender, + BS	  Skin: No rashes, No ecchymoses, No cyanosis	  Neurologic: Awake, Alert, Oriented to person, place, and time. Speech fluent, repetition and naming intact. Follows simple and complex commands. Attention/concentration, recent and remote memory (including registration 3/3 and recall 3/3), and fund of knowledge intact. Mild to moderate dysarthria.     Cranial nerves - PERRLA (4mm -> 3mm b/l), VFF, EOMI, face sensation (V1-V3) intact b/l,LFacial droop hearing intact b/l, palate with symmetric elevation, tongue midline on protrusion with full lateral movement    Motor - Normal bulk and tone throughout. +drift LUE, LLE    Strength testing            Deltoid      Biceps      Triceps     Wrist Extension    Wrist Flexion     Interossei         R            5                 5               5                     5                              5                        5                 5  L             4                 4               4                     4                              4                        4                 4              Hip Flexion    Hip Extension    Knee Flexion    Knee Extension    Dorsiflexion    Plantar Flexion  R              5                         5                       5                           5                            5                          5  L              4                         4                        5                           5                            5                          5    Sensation - Light touch  intact throughout    DTR's -             Biceps      Triceps     Brachioradialis      Patellar    Ankle    Toes/plantar response  R             2+             2+                  2+                       2+            2+                 Down  L              2+             2+                 2+                        2+           2+                 Down    Coordination - Ataxic on L heel to shin, dystaxic on L FTN however not out of proportion to weakness    Gait and station - Not assessed  Extremities: Normal range of motion, No clubbing, cyanosis or edema  Vascular: Peripheral pulses palpable 2+ bilaterally        LABS:    CARDIAC MARKERS:                                13.6   6.70  )-----------( 250      ( 23 Sep 2024 06:38 )             43.0     09-23    141  |  105  |  15  ----------------------------<  103[H]  3.6   |  20[L]  |  1.17    Ca    9.3      23 Sep 2024 06:43  Phos  3.5     09-21  Mg     2.3     09-21    TPro  7.0  /  Alb  3.9  /  TBili  0.5  /  DBili  x   /  AST  18  /  ALT  32  /  AlkPhos  74  09-23    proBNP:   Lipid Profile:   HgA1c:   TSH:             TELEMETRY: 	SR    ECG:  	  RADIOLOGY:   DIAGNOSTIC TESTING:  [ ] Echocardiogram:  < from: [facility] [employer] W or WO Ultrasound Enhancing Agent (09.20.24 @ 10:36) >    TRANSTHORACIC ECHOCARDIOGRAM REPORT  ________________________________________________________________________________                                      _______       Pt. Name:       MICA LE Study Date:    9/20/2024  MRN:            [id number]   YOB: 1955  Accession #:    [id number]    Age:           69 years  Account#:       [id number] Gender:        M  Heart Rate:                  Height:        66.00 in (167.64 cm)  Rhythm:                      Weight:        130.00 lb(58.97 kg)  Blood Pressure: 127/76 mmHg  BSA/BMI:       1.67 m² / 20.98 kg/m²  ________________________________________________________________________________________  Referring Physician:    [id number] Nu Jerry  Interpreting Physician: Yovanny Morales M.D.  Primary Sonographer:    Miranda Hagen RDCS    CPT:               ECHO TTE WO CON COMP W DOPP - 81458.m  Indication(s):     Cerebral infarction, unspecified - I63.9  Procedure:         Transthoracic echocardiogram with 2-D, M-mode and complete                     spectral and color flow Doppler.  Ordering Location: [facility]  Admission Status:  Inpatient  Study Information: Image quality for this study is adequate.    _______________________________________________________________________________________     CONCLUSIONS:      1. Left ventricular cavity is normal in size. Left ventricular wall thickness is normal. There are no regional wall motion abnormalities seen.   2. Normal left ventricular diastolic function, with normal left ventricular filling pressure.   3. Normal right ventricular cavity size, with normal wall thickness, and normal right ventricular systolic function.   4. No pericardial effusion seen.   5. Left ventricular endocardium is not well visualized; however, the left ventricular systolic function appears grossly normal.   6. No prior echocardiogram is available for comparison.   7. There is normal LV mass and concentric remodeling.   8. Trileaflet aortic valve with normal systolic excursion. There is calcification of the aortic valve leaflets.    ________________________________________________________________________________________  FINDINGS:     Left Ventricle:  The left ventricular cavity is normal in size. Left ventricular wall thickness is normal. There are no regional wall motion abnormalities seen. There is normal LV mass and concentric remodeling. Left ventricular endocardium is not well visualized; however, the left ventricular systolic function appears grossly normal. There is normal left ventricular diastolic function, with normal left ventricular filling pressure.     Right Ventricle:  The right ventricle is not well visualized. The right ventricular cavity is normal in size, with normal wall thickness and right ventricular systolic function is normal. Tricuspid annular plane systolic excursion (TAPSE) is 1.6 cm (normal >=1.7 cm).     Left Atrium:  The left atrium is normal in size with an indexed volume of 15.97 ml/m².     Right Atrium:  The right atrium is normal in size.     Interatrial Septum:  The interatrial septum appears intact.     Aortic Valve:  The aortic valve was not well visualized. The aortic valve appears trileaflet with normal systolic excursion. There is calcification of the aortic valve leaflets. There isno aortic valve stenosis. There is trace aortic regurgitation.     Mitral Valve:  Structurally normal mitral valve with normal leaflet excursion. There is reduced leaflet mobility of the mitral valve. There is calcification of the mitral valve annulus. There is no mitral valve stenosis. There is trace mitral regurgitation.     Tricuspid Valve:  Structurally normal tricuspid valve with normal leaflet excursion.     Pulmonic Valve:  Structurally normal pulmonic valve with normal leaflet excursion.     Aorta:  The aortic root appears normal in size. The aortic root at the sinuses of Valsalva is dilated, measuring 3.68 cm (indexed 2.21 cm/m²).     Pericardium:  No pericardial effusion seen.     Systemic Veins:  The inferior vena cava is normal insize (normal <2.1cm) with normal inspiratory collapse (normal >50%) consistent with normal right atrial pressure (~3, range 0-5mmHg).    < end of copied text >    [ ]  Catheterization:  [ ] Stress Test:    OTHER:

## 2024-09-23 NOTE — PROGRESS NOTE ADULT - TIME BILLING
-review of the history and records  -general and neurological examination  -generation of assessment and treatment plan  -communication with the patient/family members  -coordination of care.
Advanced care planning was discussed with patient and family.  Advanced care planning forms were reviewed and discussed as appropriate.  Differential diagnosis and plan of care discussed with patient after the evaluation.   Pain assessed and judicious use of narcotics when appropriate was discussed.  Importance of Fall prevention discussed.  Counseling on Smoking and Alcohol cessation was offered when appropriate.  Counseling on Diet, exercise, and medication compliance was done.

## 2024-09-23 NOTE — H&P ADULT - NSHPREVIEWOFSYSTEMS_GEN_ALL_CORE
REVIEW OF SYSTEMS  Constitutional: No fever  HEENT: No difficulty hearing  Pulm: + mild dry cough,  No shortness of breath  Cardio: No chest pain, No palpitations  GI:  No abdominal pain, No nausea, No vomiting, No diarrhea, No constipation, LBM 9/23  : No dysuria, No hematuria  Neuro: No headaches, + L-sided weakness, No numbness  Skin: No rashes  MSK: No joint pain, No joint swelling, No muscle pain  Psych:  +mild anxiety

## 2024-09-24 LAB
ALBUMIN SERPL ELPH-MCNC: 3.6 G/DL — SIGNIFICANT CHANGE UP (ref 3.3–5)
ALP SERPL-CCNC: 81 U/L — SIGNIFICANT CHANGE UP (ref 40–120)
ALT FLD-CCNC: 40 U/L — SIGNIFICANT CHANGE UP (ref 10–45)
ANION GAP SERPL CALC-SCNC: 8 MMOL/L — SIGNIFICANT CHANGE UP (ref 5–17)
AST SERPL-CCNC: 19 U/L — SIGNIFICANT CHANGE UP (ref 10–40)
BASOPHILS # BLD AUTO: 0.04 K/UL — SIGNIFICANT CHANGE UP (ref 0–0.2)
BASOPHILS NFR BLD AUTO: 0.6 % — SIGNIFICANT CHANGE UP (ref 0–2)
BILIRUB SERPL-MCNC: 1 MG/DL — SIGNIFICANT CHANGE UP (ref 0.2–1.2)
BUN SERPL-MCNC: 14 MG/DL — SIGNIFICANT CHANGE UP (ref 7–23)
CALCIUM SERPL-MCNC: 9.2 MG/DL — SIGNIFICANT CHANGE UP (ref 8.4–10.5)
CHLORIDE SERPL-SCNC: 103 MMOL/L — SIGNIFICANT CHANGE UP (ref 96–108)
CO2 SERPL-SCNC: 29 MMOL/L — SIGNIFICANT CHANGE UP (ref 22–31)
CREAT SERPL-MCNC: 1.02 MG/DL — SIGNIFICANT CHANGE UP (ref 0.5–1.3)
EGFR: 78 ML/MIN/1.73M2 — SIGNIFICANT CHANGE UP
EOSINOPHIL # BLD AUTO: 0.09 K/UL — SIGNIFICANT CHANGE UP (ref 0–0.5)
EOSINOPHIL NFR BLD AUTO: 1.3 % — SIGNIFICANT CHANGE UP (ref 0–6)
GLUCOSE SERPL-MCNC: 113 MG/DL — HIGH (ref 70–99)
HCT VFR BLD CALC: 42.6 % — SIGNIFICANT CHANGE UP (ref 39–50)
HGB BLD-MCNC: 14 G/DL — SIGNIFICANT CHANGE UP (ref 13–17)
IMM GRANULOCYTES NFR BLD AUTO: 0.4 % — SIGNIFICANT CHANGE UP (ref 0–0.9)
LYMPHOCYTES # BLD AUTO: 1.37 K/UL — SIGNIFICANT CHANGE UP (ref 1–3.3)
LYMPHOCYTES # BLD AUTO: 20.2 % — SIGNIFICANT CHANGE UP (ref 13–44)
MCHC RBC-ENTMCNC: 28.3 PG — SIGNIFICANT CHANGE UP (ref 27–34)
MCHC RBC-ENTMCNC: 32.9 GM/DL — SIGNIFICANT CHANGE UP (ref 32–36)
MCV RBC AUTO: 86.2 FL — SIGNIFICANT CHANGE UP (ref 80–100)
MONOCYTES # BLD AUTO: 0.6 K/UL — SIGNIFICANT CHANGE UP (ref 0–0.9)
MONOCYTES NFR BLD AUTO: 8.8 % — SIGNIFICANT CHANGE UP (ref 2–14)
NEUTROPHILS # BLD AUTO: 4.65 K/UL — SIGNIFICANT CHANGE UP (ref 1.8–7.4)
NEUTROPHILS NFR BLD AUTO: 68.7 % — SIGNIFICANT CHANGE UP (ref 43–77)
NRBC # BLD: 0 /100 WBCS — SIGNIFICANT CHANGE UP (ref 0–0)
PLATELET # BLD AUTO: 254 K/UL — SIGNIFICANT CHANGE UP (ref 150–400)
POTASSIUM SERPL-MCNC: 3.5 MMOL/L — SIGNIFICANT CHANGE UP (ref 3.5–5.3)
POTASSIUM SERPL-SCNC: 3.5 MMOL/L — SIGNIFICANT CHANGE UP (ref 3.5–5.3)
PROT SERPL-MCNC: 7.2 G/DL — SIGNIFICANT CHANGE UP (ref 6–8.3)
RBC # BLD: 4.94 M/UL — SIGNIFICANT CHANGE UP (ref 4.2–5.8)
RBC # FLD: 13.2 % — SIGNIFICANT CHANGE UP (ref 10.3–14.5)
SODIUM SERPL-SCNC: 140 MMOL/L — SIGNIFICANT CHANGE UP (ref 135–145)
WBC # BLD: 6.78 K/UL — SIGNIFICANT CHANGE UP (ref 3.8–10.5)
WBC # FLD AUTO: 6.78 K/UL — SIGNIFICANT CHANGE UP (ref 3.8–10.5)

## 2024-09-24 PROCEDURE — 99233 SBSQ HOSP IP/OBS HIGH 50: CPT

## 2024-09-24 PROCEDURE — 99223 1ST HOSP IP/OBS HIGH 75: CPT

## 2024-09-24 RX ADMIN — LOSARTAN POTASSIUM 100 MILLIGRAM(S): 100 TABLET, FILM COATED ORAL at 05:50

## 2024-09-24 RX ADMIN — Medication 81 MILLIGRAM(S): at 11:32

## 2024-09-24 RX ADMIN — PANTOPRAZOLE SODIUM 40 MILLIGRAM(S): 40 TABLET, DELAYED RELEASE ORAL at 05:51

## 2024-09-24 RX ADMIN — ATORVASTATIN CALCIUM 80 MILLIGRAM(S): 10 TABLET, FILM COATED ORAL at 21:25

## 2024-09-24 RX ADMIN — Medication 650 MILLIGRAM(S): at 00:53

## 2024-09-24 RX ADMIN — ENOXAPARIN SODIUM 40 MILLIGRAM(S): 150 INJECTION SUBCUTANEOUS at 11:31

## 2024-09-24 NOTE — DIETITIAN INITIAL EVALUATION ADULT - PERTINENT MEDS FT
MEDICATIONS  (STANDING):  aspirin  chewable 81 milliGRAM(s) Oral daily  atorvastatin 80 milliGRAM(s) Oral at bedtime  enoxaparin Injectable 40 milliGRAM(s) SubCutaneous every 24 hours  losartan 100 milliGRAM(s) Oral daily  pantoprazole    Tablet 40 milliGRAM(s) Oral before breakfast  polyethylene glycol 3350 17 Gram(s) Oral daily  senna 2 Tablet(s) Oral at bedtime    MEDICATIONS  (PRN):  acetaminophen     Tablet .. 650 milliGRAM(s) Oral every 6 hours PRN Mild Pain (1 - 3)  melatonin 6 milliGRAM(s) Oral at bedtime PRN Insomnia

## 2024-09-24 NOTE — PROGRESS NOTE ADULT - SUBJECTIVE AND OBJECTIVE BOX
Patient is a 69y old  Male who presents with a chief complaint of R pontine and L callosal infarct (24 Sep 2024 07:07)    HPI:  68 y/o male RH-dominant, with a PMH of HTN, HLD who presents to [facility] on 9/19/24 with acute onset gait difficulties, left side weakness, slurred speech, intermittent dizziness, and diplopia. LKN is 0700 am 9/18. 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 TTE showed normal LV function. Patient was not a candidate for TNK as he was outside of appropriate window, nor mechanical thrombectomy as there was no LVO. Aspirin and high dose statin was initiated.    Hospital course significant for elevated LFTs, CT C/A/P showed cholelithiasis, patient is recommended to f/u with outpatient GI as he is asymptomatic.     Patient optimized and was evaluated by PM&R and therapy for functional deficits, gait/ADL impairments and acute rehabilitation was recommended. Patient was cleared for discharge to [facility]F on 9/23/24. (23 Sep 2024 15:20)      SUBJECTIVE  Interval History:      Allergies    grass (Sneezing)  No Known Drug Allergies  dust (Sneezing)    Intolerances        OBJECTIVE    69y  Vital Signs Last 24 Hrs  T(C): 36.4 (24 Sep 2024 07:44), Max: 37.1 (23 Sep 2024 13:07)  T(F): 97.6 (24 Sep 2024 07:44), Max: 98.8 (23 Sep 2024 13:07)  HR: 57 (24 Sep 2024 07:44) (55 - 72)  BP: 136/86 (24 Sep 2024 07:44) (119/70 - 136/86)  BP(mean): --  RR: 16 (24 Sep 2024 07:44) (15 - 18)  SpO2: 96% (24 Sep 2024 07:44) (94% - 97%)    Parameters below as of 24 Sep 2024 07:44  Patient On (Oxygen Delivery Method): room air      Daily Height in cm: 172.72 (23 Sep 2024 19:21)    Daily       MEDICATIONS  (STANDING):  aspirin  chewable 81 milliGRAM(s) Oral daily  atorvastatin 80 milliGRAM(s) Oral at bedtime  enoxaparin Injectable 40 milliGRAM(s) SubCutaneous every 24 hours  losartan 100 milliGRAM(s) Oral daily  pantoprazole    Tablet 40 milliGRAM(s) Oral before breakfast  polyethylene glycol 3350 17 Gram(s) Oral daily  senna 2 Tablet(s) Oral at bedtime    MEDICATIONS  (PRN):  acetaminophen     Tablet .. 650 milliGRAM(s) Oral every 6 hours PRN Mild Pain (1 - 3)  melatonin 6 milliGRAM(s) Oral at bedtime PRN Insomnia      RECENT LABS:                          14.0   6.78  )-----------( 254      ( 24 Sep 2024 06:22 )             42.6     09-23    141  |  105  |  15  ----------------------------<  103[H]  3.6   |  20[L]  |  1.17    Ca    9.3      23 Sep 2024 06:43    TPro  7.0  /  Alb  3.9  /  TBili  0.5  /  DBili  x   /  AST  18  /  ALT  32  /  AlkPhos  74  09-23    LIVER FUNCTIONS - ( 23 Sep 2024 06:43 )  Alb: 3.9 g/dL / Pro: 7.0 g/dL / ALK PHOS: 74 U/L / ALT: 32 U/L / AST: 18 U/L / GGT: x             Urinalysis Basic - ( 23 Sep 2024 06:43 )    Color: x / Appearance: x / SG: x / pH: x  Gluc: 103 mg/dL / Ketone: x  / Bili: x / Urobili: x   Blood: x / Protein: x / Nitrite: x   Leuk Esterase: x / RBC: x / WBC x   Sq Epi: x / Non Sq Epi: x / Bacteria: x        CAPILLARY BLOOD GLUCOSE

## 2024-09-24 NOTE — DIETITIAN INITIAL EVALUATION ADULT - NSFNSGIIOFT_GEN_A_CORE
Pt with no reported N/V/D/C at this time - receives bowel regimen if constipated. Encouraged fluids and fiber as well.

## 2024-09-24 NOTE — CONSULT NOTE ADULT - SUBJECTIVE AND OBJECTIVE BOX
70 y/o male RH-dominant, with a PMH of HTN, HLD who presents to [facility] on 9/19/24 with acute onset gait difficulties, left side weakness, slurred speech, intermittent dizziness, and diplopia. LKN is 0700 am 9/18. 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 TTE showed normal LV function. Patient was not a candidate for TNK as he was outside of appropriate window, nor mechanical thrombectomy as there was no LVO. Aspirin and high dose statin was initiated.    Hospital course significant for elevated LFTs, CT C/A/P showed cholelithiasis, patient is recommended to f/u with outpatient GI as he is asymptomatic.       PAST MEDICAL & SURGICAL HISTORY:  Hypertriglyceridemia  HTN (hypertension)  Fracture  olecrenon process  S/P inguinal hernia repair  bilateral    FAMILY HISTORY  Father: - at age - with history of   Mother: - at age - with history of     SOCIAL HISTORY  Substance Use (street drugs): (  ) never used  (  ) other:  Tobacco Usage:  (   ) never smoked   (   ) former smoker   (   ) current smoker  (     ) pack year  Alcohol Usage:  Sexual History:   Recent Travel:    Allergies  grass (Sneezing)  No Known Drug Allergies  dust (Sneezing)    Intolerances    REVIEW OF SYSTEMS:  CONSTITUTIONAL: No fever, weight loss, or fatigue  EYES: No eye pain, visual disturbances, or discharge  ENMT:  No difficulty hearing, tinnitus, vertigo; No sinus or throat pain  NECK: No pain or stiffness  BREASTS: No pain, masses, or nipple discharge  RESPIRATORY: No cough, wheezing, chills or hemoptysis; No shortness of breath  CARDIOVASCULAR: No chest pain, palpitations, dizziness, or leg swelling  GASTROINTESTINAL: No abdominal or epigastric pain. No nausea, vomiting, or hematemesis; No diarrhea or constipation. No melena or hematochezia.  GENITOURINARY: No dysuria, frequency, hematuria, or incontinence  NEUROLOGICAL: No headaches, memory loss, loss of strength, numbness, or tremors  SKIN: No itching, burning, rashes, or lesions   LYMPH NODES: No enlarged glands  ENDOCRINE: No heat or cold intolerance; No hair loss  MUSCULOSKELETAL: No joint pain or swelling; No muscle, back, or extremity pain  PSYCHIATRIC: No depression, anxiety, mood swings, or difficulty sleeping  HEME/LYMPH: No easy bruising, or bleeding gums  ALLERY AND IMMUNOLOGIC: No hives or eczema    ALL ROS REVIEWED AND NORMAL EXCEPT AS STATED ABOVE    T(C): 36.6 (09-23-24 @ 19:21), Max: 37.1 (09-23-24 @ 13:07)  HR: 60 (09-24-24 @ 05:50) (55 - 72)  BP: 119/70 (09-24-24 @ 05:50) (119/70 - 133/81)  RR: 15 (09-23-24 @ 23:59) (15 - 18)  SpO2: 97% (09-23-24 @ 23:59) (94% - 97%)  Wt(kg): --Vital Signs Last 24 Hrs  T(C): 36.6 (23 Sep 2024 19:21), Max: 37.1 (23 Sep 2024 13:07)  T(F): 97.9 (23 Sep 2024 19:21), Max: 98.8 (23 Sep 2024 13:07)  HR: 60 (24 Sep 2024 05:50) (55 - 72)  BP: 119/70 (24 Sep 2024 05:50) (119/70 - 133/81)  BP(mean): --  RR: 15 (23 Sep 2024 23:59) (15 - 18)  SpO2: 97% (23 Sep 2024 23:59) (94% - 97%)    Parameters below as of 23 Sep 2024 23:59  Patient On (Oxygen Delivery Method): room air    PHYSICAL EXAM:  GENERAL: NAD, well-groomed, well-developed  HEAD:  Atraumatic, Normocephalic  EYES: EOMI, PERRLA, conjunctiva and sclera clear  ENMT: No tonsillar erythema, exudates, or enlargement; Moist mucous membranes, Good dentition, No lesions  NECK: Supple, No JVD, Normal thyroid  NERVOUS SYSTEM:  Alert & Oriented X3, Good concentration; Motor Strength 5/5 B/L upper and lower extremities; DTRs 2+ intact and symmetric  CHEST/LUNG: Clear to percussion bilaterally; No rales, rhonchi, wheezing, or rubs  HEART: Regular rate and rhythm; No murmurs, rubs, or gallops  ABDOMEN: Soft, Nontender, Nondistended; Bowel sounds present  EXTREMITIES:  2+ Peripheral Pulses, No clubbing, cyanosis, or edema  LYMPH: No lymphadenopathy noted  SKIN: No rashes or lesions    LABS:                        13.6   6.70  )-----------( 250      ( 23 Sep 2024 06:38 )             43.0     09-23    141  |  105  |  15  ----------------------------<  103[H]  3.6   |  20[L]  |  1.17    Ca    9.3      23 Sep 2024 06:43    TPro  7.0  /  Alb  3.9  /  TBili  0.5  /  DBili  x   /  AST  18  /  ALT  32  /  AlkPhos  74  09-23      Urinalysis Basic - ( 23 Sep 2024 06:43 )    Color: x / Appearance: x / SG: x / pH: x  Gluc: 103 mg/dL / Ketone: x  / Bili: x / Urobili: x   Blood: x / Protein: x / Nitrite: x   Leuk Esterase: x / RBC: x / WBC x   Sq Epi: x / Non Sq Epi: x / Bacteria: x    CAPILLARY BLOOD GLUCOSE    Urinalysis Basic - ( 23 Sep 2024 06:43 )    Color: x / Appearance: x / SG: x / pH: x  Gluc: 103 mg/dL / Ketone: x  / Bili: x / Urobili: x   Blood: x / Protein: x / Nitrite: x   Leuk Esterase: x / RBC: x / WBC x   Sq Epi: x / Non Sq Epi: x / Bacteria: x    RADIOLOGY & ADDITIONAL TESTS:  CT Abdomen and Pelvis w/ IV Cont (09.23.24 @ 14:00) >    IMPRESSION:  No evidence of suspicious mass or adenopathy in the chest, abdomen, or   pelvis.    Cholelithiasis with nonspecific thickening of the gallbladder wall.   Correlate with LFTs.    Consultant(s) Notes Reviewed:  [x ] YES  [ ] NO  Care Discussed with Consultants/Other Providers [ x] YES  [ ] NO  Imaging Personally Reviewed:  [ ] YES  [x ] NO 70 y/o male RH-dominant, with a PMH of HTN, HLD who presents to [facility] on 9/19/24 with acute onset gait difficulties, left side weakness, slurred speech, intermittent dizziness, and diplopia. LKN is 0700 am 9/18. 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 TTE showed normal LV function. Patient was not a candidate for TNK as he was outside of appropriate window, nor mechanical thrombectomy as there was no LVO. Aspirin and high dose statin was initiated.    Hospital course significant for elevated LFTs, CT C/A/P showed cholelithiasis, patient is recommended to f/u with outpatient GI as he is asymptomatic.     Denies chest pain, SOB  Tolerating diet, Last BM yesterday        PAST MEDICAL & SURGICAL HISTORY:  Hypertriglyceridemia  HTN (hypertension)  Fracture  olecrenon process  S/P inguinal hernia repair  bilateral    FAMILY HISTORY  Both mother and father had HTN    SOCIAL HISTORY  Substance Use (street drugs): ( x ) never used  (  ) other:  Tobacco Usage:  (   x) never smoked   (   ) former smoker   (   ) current smoker  (     ) pack year  Alcohol Usage:Denies  Sexual History: Denies  Recent Travel:Denies    Allergies  grass (Sneezing)  No Known Drug Allergies  dust (Sneezing)    Intolerances    REVIEW OF SYSTEMS:  CONSTITUTIONAL: No fever, weight loss, or fatigue  EYES: No eye pain, visual disturbances, or discharge  ENMT:  No difficulty hearing, tinnitus, vertigo; No sinus or throat pain  NECK: No pain or stiffness  BREASTS: No pain, masses, or nipple discharge  RESPIRATORY: No cough, wheezing, chills or hemoptysis; No shortness of breath  CARDIOVASCULAR: No chest pain, palpitations, dizziness, or leg swelling  GASTROINTESTINAL: No abdominal or epigastric pain. No nausea, vomiting, or hematemesis; No diarrhea or constipation. No melena or hematochezia.  GENITOURINARY: No dysuria, frequency, hematuria, or incontinence  NEUROLOGICAL: No headaches, memory loss, loss of strength, numbness, or tremors  SKIN: No itching, burning, rashes, or lesions   LYMPH NODES: No enlarged glands  ENDOCRINE: No heat or cold intolerance; No hair loss  MUSCULOSKELETAL: No joint pain or swelling; No muscle, back, or extremity pain  PSYCHIATRIC: No depression, anxiety, mood swings, or difficulty sleeping  HEME/LYMPH: No easy bruising, or bleeding gums  ALLERY AND IMMUNOLOGIC: No hives or eczema    ALL ROS REVIEWED AND NORMAL EXCEPT AS STATED ABOVE    T(C): 36.6 (09-23-24 @ 19:21), Max: 37.1 (09-23-24 @ 13:07)  HR: 60 (09-24-24 @ 05:50) (55 - 72)  BP: 119/70 (09-24-24 @ 05:50) (119/70 - 133/81)  RR: 15 (09-23-24 @ 23:59) (15 - 18)  SpO2: 97% (09-23-24 @ 23:59) (94% - 97%)  Wt(kg): --Vital Signs Last 24 Hrs  T(C): 36.6 (23 Sep 2024 19:21), Max: 37.1 (23 Sep 2024 13:07)  T(F): 97.9 (23 Sep 2024 19:21), Max: 98.8 (23 Sep 2024 13:07)  HR: 60 (24 Sep 2024 05:50) (55 - 72)  BP: 119/70 (24 Sep 2024 05:50) (119/70 - 133/81)  BP(mean): --  RR: 15 (23 Sep 2024 23:59) (15 - 18)  SpO2: 97% (23 Sep 2024 23:59) (94% - 97%)    Parameters below as of 23 Sep 2024 23:59  Patient On (Oxygen Delivery Method): room air    PHYSICAL EXAM:  GENERAL: NAD, well-groomed, well-developed  HEAD:  Atraumatic, Normocephalic  EYES: EOMI, PERRLA, conjunctiva and sclera clear  ENMT: No tonsillar erythema, exudates, or enlargement; Moist mucous membranes, Good dentition, No lesions  NECK: Supple, No JVD, Normal thyroid  NERVOUS SYSTEM:  Alert & Oriented   CHEST/LUNG: Clear to percussion bilaterally; No rales, rhonchi, wheezing, or rubs  HEART: Regular rate and rhythm; No murmurs, rubs, or gallops  ABDOMEN: Soft, Nontender, Nondistended; Bowel sounds present  EXTREMITIES:  2+ Peripheral Pulses, No clubbing, cyanosis, or edema  LYMPH: No lymphadenopathy noted  SKIN: No rashes or lesions    LABS:                        13.6   6.70  )-----------( 250      ( 23 Sep 2024 06:38 )             43.0     09-23    141  |  105  |  15  ----------------------------<  103[H]  3.6   |  20[L]  |  1.17    Ca    9.3      23 Sep 2024 06:43    TPro  7.0  /  Alb  3.9  /  TBili  0.5  /  DBili  x   /  AST  18  /  ALT  32  /  AlkPhos  74  09-23      Urinalysis Basic - ( 23 Sep 2024 06:43 )    Color: x / Appearance: x / SG: x / pH: x  Gluc: 103 mg/dL / Ketone: x  / Bili: x / Urobili: x   Blood: x / Protein: x / Nitrite: x   Leuk Esterase: x / RBC: x / WBC x   Sq Epi: x / Non Sq Epi: x / Bacteria: x    CAPILLARY BLOOD GLUCOSE    Urinalysis Basic - ( 23 Sep 2024 06:43 )    Color: x / Appearance: x / SG: x / pH: x  Gluc: 103 mg/dL / Ketone: x  / Bili: x / Urobili: x   Blood: x / Protein: x / Nitrite: x   Leuk Esterase: x / RBC: x / WBC x   Sq Epi: x / Non Sq Epi: x / Bacteria: x    RADIOLOGY & ADDITIONAL TESTS:  CT Abdomen and Pelvis w/ IV Cont (09.23.24 @ 14:00) >    IMPRESSION:  No evidence of suspicious mass or adenopathy in the chest, abdomen, or   pelvis.    Cholelithiasis with nonspecific thickening of the gallbladder wall.   Correlate with LFTs.    Consultant(s) Notes Reviewed:  [x ] YES  [ ] NO  Care Discussed with Consultants/Other Providers [ x] YES  [ ] NO  Imaging Personally Reviewed:  [ ] YES  [x ] NO

## 2024-09-24 NOTE — CONSULT NOTE ADULT - ASSESSMENT
70 y/o male RHD with a PMH of HTN, HLD who presents to [facility] on 9/19/24 with acute onset gait difficulties, left side weakness, slurred speech, intermittent dizziness, and diplopia secondary to R pontine and L callosal infarct. Hospital course significant for elevated LFTs/ cholelithiasis (to be managed as outpt).    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  - TTE (9/20)- normal LV function  - S/P ILR for afib surveillance  - Aspirin 81mg daily  - Atorvastatin 80mg HS    # HTN  - Losartan 100mg daily    # HLD  - Atorvastatin 80mg HS    # cholelithiasis  # Elevated LFTs  - CT C/A/P (9/23)- Cholelithiasis with nonspecific thickening of the gallbladder wall. Correlate with LFTs.  - monitor LFTs, avoid hepatotoxic meds  - outpatient GI follow up    DVT ppx- Lovenox

## 2024-09-24 NOTE — DIETITIAN INITIAL EVALUATION ADULT - OTHER INFO
Pt with possible significant wt loss. Pt reports UBW ~154lbs & CBW ~145lbs. No intentional wt loss reported by pt most likely attributed to current hospitalization.

## 2024-09-24 NOTE — PROGRESS NOTE ADULT - ASSESSMENT
MICA LE is a 70 y/o male RHD with a PMH of HTN, HLD who presents to [facility] on 9/19/24 with acute onset gait difficulties, left side weakness, slurred speech, intermittent dizziness, and diplopia secondary to R pontine and L callosal infarct. Hospital course significant for elevated LFTs/ cholelithiasis (to be managed as outpt). He is now admitted to [facility] for a multidisciplinary rehab program consisting focused on functional mobility, transfers and ADLs.    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  - CTH (9/19)- No hydrocephalus, acute intracranial hemorrhage, mass effect, or brain edema.  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   - TTE (9/20)- normal LV function  - S/P ILR for afib surveillance  - Aspirin 81mg daily  - Atorvastatin 80mg HS  - Start comprehensive rehab program, 3 hours a day, 5 days a week. OT PT SLP  - Precautions: cardiac, fall, vision, ILR    # HTN  - Losartan 100mg daily  - monitor vitals, hospitalist consult    # HLD  - Atorvastatin 80mg HS    # cholelithiasis  # Elevated LFTs  - CT C/A/P (9/23)- Cholelithiasis with nonspecific thickening of the gallbladder wall. Correlate with LFTs.  - monitor LFTs, avoid hepatotoxic meds  - outpatient GI follow up    # Sleep:   - Maintain quiet hours and low stim environment.  - Melatonin 6mg PRN to maximize participation in therapy during the day.     # Pain Management:  - Tylenol PRN    # GI/Bowel:  - Senna QHS, Miralax PRN Daily  - GI ppx: Pantoprazole 40mg    # /Bladder:   - PVR x1 on admission (SC if > 400)    # Skin/Pressure Injury:   - Skin assessment on admission: loop recorder site c/d/i    # Diet  - Regular- DASH    #  DVT ppx:  -  Lovenox 40mg daily    # LABS  CBC CMP 9/24     ---------------  Code Status: FULL  Emergency Contact:    Outpatient Follow-up (Specialty/Name of physician):    Libman, Richard Benjamin  Neurology  [address], [address]  [place] [address]  Phone: [phone]  Fax: [phone]  Follow Up Time: 2 weeks    Edin Neves  Cardiology  [address], [address]  [place] [address]  Phone: [phone]  Fax: [phone]  Follow Up Time: 2 weeks    Felice Hazel  Cardiac Electrophysiology  [address] [place] [address], [id number]  [place] [address]  Phone: [phone]  Fax: [phone]  Follow Up Time: 2 weeks    Felice Herrera  Gastroenterology  [address] [place] [address]  Phone: [phone]  Fax: [phone]  Follow Up Time: 2 weeks

## 2024-09-24 NOTE — DIETITIAN INITIAL EVALUATION ADULT - PERTINENT LABORATORY DATA
09-24    140  |  103  |  14  ----------------------------<  113[H]  3.5   |  29  |  1.02    Ca    9.2      24 Sep 2024 06:22    TPro  7.2  /  Alb  3.6  /  TBili  1.0  /  DBili  x   /  AST  19  /  ALT  40  /  AlkPhos  81  09-24  A1C with Estimated Average Glucose Result: 5.8 % (09-20-24 @ 06:28)

## 2024-09-25 PROCEDURE — 99232 SBSQ HOSP IP/OBS MODERATE 35: CPT

## 2024-09-25 RX ADMIN — ATORVASTATIN CALCIUM 80 MILLIGRAM(S): 10 TABLET, FILM COATED ORAL at 21:30

## 2024-09-25 RX ADMIN — ENOXAPARIN SODIUM 40 MILLIGRAM(S): 150 INJECTION SUBCUTANEOUS at 12:16

## 2024-09-25 RX ADMIN — LOSARTAN POTASSIUM 100 MILLIGRAM(S): 100 TABLET, FILM COATED ORAL at 05:40

## 2024-09-25 RX ADMIN — Medication 2 TABLET(S): at 21:30

## 2024-09-25 RX ADMIN — Medication 81 MILLIGRAM(S): at 12:16

## 2024-09-25 RX ADMIN — PANTOPRAZOLE SODIUM 40 MILLIGRAM(S): 40 TABLET, DELAYED RELEASE ORAL at 05:40

## 2024-09-25 NOTE — PROGRESS NOTE ADULT - SUBJECTIVE AND OBJECTIVE BOX
Patient is a 69y old  Male who presents with a chief complaint of Cerebral infarction     (24 Sep 2024 11:48)      HPI:  68 y/o male RH-dominant, with a PMH of HTN, HLD who presents to [facility] on 9/19/24 with acute onset gait difficulties, left side weakness, slurred speech, intermittent dizziness, and diplopia. LKN is 0700 am 9/18. 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 TTE showed normal LV function. Patient was not a candidate for TNK as he was outside of appropriate window, nor mechanical thrombectomy as there was no LVO. Aspirin and high dose statin was initiated.    Hospital course significant for elevated LFTs, CT C/A/P showed cholelithiasis, patient is recommended to f/u with outpatient GI as he is asymptomatic.     Patient optimized and was evaluated by PM&R and therapy for functional deficits, gait/ADL impairments and acute rehabilitation was recommended. Patient was cleared for discharge to [facility] IRF on 9/23/24. (23 Sep 2024 15:20)      PAST MEDICAL & SURGICAL HISTORY:  Hypertriglyceridemia      HTN (hypertension)      Fracture  olecrenon process      S/P inguinal hernia repair  bilateral          MEDICATIONS  (STANDING):  aspirin  chewable 81 milliGRAM(s) Oral daily  atorvastatin 80 milliGRAM(s) Oral at bedtime  enoxaparin Injectable 40 milliGRAM(s) SubCutaneous every 24 hours  losartan 100 milliGRAM(s) Oral daily  pantoprazole    Tablet 40 milliGRAM(s) Oral before breakfast  polyethylene glycol 3350 17 Gram(s) Oral daily  senna 2 Tablet(s) Oral at bedtime    MEDICATIONS  (PRN):  acetaminophen     Tablet .. 650 milliGRAM(s) Oral every 6 hours PRN Mild Pain (1 - 3)  melatonin 6 milliGRAM(s) Oral at bedtime PRN Insomnia      Allergies    grass (Sneezing)  No Known Drug Allergies  dust (Sneezing)    Intolerances          VITALS  69y  Vital Signs Last 24 Hrs  T(C): 36.7 (25 Sep 2024 08:22), Max: 36.9 (24 Sep 2024 19:36)  T(F): 98 (25 Sep 2024 08:22), Max: 98.4 (24 Sep 2024 19:36)  HR: 63 (25 Sep 2024 08:22) (61 - 63)  BP: 116/73 (25 Sep 2024 08:22) (115/73 - 116/73)  BP(mean): --  RR: 15 (25 Sep 2024 08:22) (15 - 15)  SpO2: 95% (25 Sep 2024 08:22) (95% - 95%)    Parameters below as of 25 Sep 2024 08:22  Patient On (Oxygen Delivery Method): room air      Daily     Daily         RECENT LABS:                          14.0   6.78  )-----------( 254      ( 24 Sep 2024 06:22 )             42.6     09-24    140  |  103  |  14  ----------------------------<  113[H]  3.5   |  29  |  1.02    Ca    9.2      24 Sep 2024 06:22    TPro  7.2  /  Alb  3.6  /  TBili  1.0  /  DBili  x   /  AST  19  /  ALT  40  /  AlkPhos  81  09-24    LIVER FUNCTIONS - ( 24 Sep 2024 06:22 )  Alb: 3.6 g/dL / Pro: 7.2 g/dL / ALK PHOS: 81 U/L / ALT: 40 U/L / AST: 19 U/L / GGT: x             Urinalysis Basic - ( 24 Sep 2024 06:22 )    Color: x / Appearance: x / SG: x / pH: x  Gluc: 113 mg/dL / Ketone: x  / Bili: x / Urobili: x   Blood: x / Protein: x / Nitrite: x   Leuk Esterase: x / RBC: x / WBC x   Sq Epi: x / Non Sq Epi: x / Bacteria: x          CAPILLARY BLOOD GLUCOSE                   Patient is a 69y old  Male who presents with a chief complaint of Cerebral infarction     (24 Sep 2024 11:48)      HPI:  68 y/o male RH-dominant, with a PMH of HTN, HLD who presents to [facility] on 9/19/24 with acute onset gait difficulties, left side weakness, slurred speech, intermittent dizziness, and diplopia. LKN is 0700 am 9/18. 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 TTE showed normal LV function. Patient was not a candidate for TNK as he was outside of appropriate window, nor mechanical thrombectomy as there was no LVO. Aspirin and high dose statin was initiated.    Hospital course significant for elevated LFTs, CT C/A/P showed cholelithiasis, patient is recommended to f/u with outpatient GI as he is asymptomatic.     Patient optimized and was evaluated by PM&R and therapy for functional deficits, gait/ADL impairments and acute rehabilitation was recommended. Patient was cleared for discharge to [facility] IRF on 9/23/24. (23 Sep 2024 15:20)      Patient seen and examined at bedside this morning. States he was experiencing some headache and dizziness ruing therapy today, no indication from therapists any change in vitals. Will monitor in afternoon PT session. No other complaints. No other acute events.    PAST MEDICAL & SURGICAL HISTORY:  Hypertriglyceridemia      HTN (hypertension)      Fracture  olecrenon process      S/P inguinal hernia repair  bilateral          MEDICATIONS  (STANDING):  aspirin  chewable 81 milliGRAM(s) Oral daily  atorvastatin 80 milliGRAM(s) Oral at bedtime  enoxaparin Injectable 40 milliGRAM(s) SubCutaneous every 24 hours  losartan 100 milliGRAM(s) Oral daily  pantoprazole    Tablet 40 milliGRAM(s) Oral before breakfast  polyethylene glycol 3350 17 Gram(s) Oral daily  senna 2 Tablet(s) Oral at bedtime    MEDICATIONS  (PRN):  acetaminophen     Tablet .. 650 milliGRAM(s) Oral every 6 hours PRN Mild Pain (1 - 3)  melatonin 6 milliGRAM(s) Oral at bedtime PRN Insomnia      Allergies    grass (Sneezing)  No Known Drug Allergies  dust (Sneezing)    Intolerances          VITALS  69y  Vital Signs Last 24 Hrs  T(C): 36.7 (25 Sep 2024 08:22), Max: 36.9 (24 Sep 2024 19:36)  T(F): 98 (25 Sep 2024 08:22), Max: 98.4 (24 Sep 2024 19:36)  HR: 63 (25 Sep 2024 08:22) (61 - 63)  BP: 116/73 (25 Sep 2024 08:22) (115/73 - 116/73)  BP(mean): --  RR: 15 (25 Sep 2024 08:22) (15 - 15)  SpO2: 95% (25 Sep 2024 08:22) (95% - 95%)    Parameters below as of 25 Sep 2024 08:22  Patient On (Oxygen Delivery Method): room air      Daily     Daily         RECENT LABS:                          14.0   6.78  )-----------( 254      ( 24 Sep 2024 06:22 )             42.6     09-24    140  |  103  |  14  ----------------------------<  113[H]  3.5   |  29  |  1.02    Ca    9.2      24 Sep 2024 06:22    TPro  7.2  /  Alb  3.6  /  TBili  1.0  /  DBili  x   /  AST  19  /  ALT  40  /  AlkPhos  81  09-24    LIVER FUNCTIONS - ( 24 Sep 2024 06:22 )  Alb: 3.6 g/dL / Pro: 7.2 g/dL / ALK PHOS: 81 U/L / ALT: 40 U/L / AST: 19 U/L / GGT: x             Urinalysis Basic - ( 24 Sep 2024 06:22 )    Color: x / Appearance: x / SG: x / pH: x  Gluc: 113 mg/dL / Ketone: x  / Bili: x / Urobili: x   Blood: x / Protein: x / Nitrite: x   Leuk Esterase: x / RBC: x / WBC x   Sq Epi: x / Non Sq Epi: x / Bacteria: x          CAPILLARY BLOOD GLUCOSE

## 2024-09-25 NOTE — PROGRESS NOTE ADULT - ATTENDING COMMENTS
Progress note amended to include my discussions with patient, resident, hospitalist, RN, SW, PT and my findings    Patient seen with Mandarin language line interpeter Camille #[id number]. Patient was in wheelchair, prior to lunch. He seems to have improved attention from yesterday, He denies diplopia; he did experience some lightheadedness which was transitory during therapy, says it felt different from dizziness related to stroke. Denies associated N/V, no chest pain, SOB, palpitations.. No reports of orthostasis from team; BP and HR stable. Will continue to monitor    He has been doing well with SLP and anticipated to dc by end of week, with 90 min OT and 90 PT. We discussed team recommendations and anticipated dc date; he is unhappy with duration of time (wishes to go home earlier). We discussed that we will continue to assess, and if he can go home earlier/safely with family support we will work with him    Continue program. Labs ordered for AM        RECENT LABS    Vital Signs Last 24 Hrs  T(C): 36.7 (25 Sep 2024 08:22), Max: 36.9 (24 Sep 2024 19:36)  T(F): 98 (25 Sep 2024 08:22), Max: 98.4 (24 Sep 2024 19:36)  HR: 63 (25 Sep 2024 08:22) (61 - 63)  BP: 116/73 (25 Sep 2024 08:22) (115/73 - 116/73)  BP(mean): --  RR: 15 (25 Sep 2024 08:22) (15 - 15)  SpO2: 95% (25 Sep 2024 08:22) (95% - 95%)    Parameters below as of 25 Sep 2024 08:22  Patient On (Oxygen Delivery Method): room air                              14.0   6.78  )-----------( 254      ( 24 Sep 2024 06:22 )             42.6     09-24    140  |  103  |  14  ----------------------------<  113[H]  3.5   |  29  |  1.02    Ca    9.2      24 Sep 2024 06:22    TPro  7.2  /  Alb  3.6  /  TBili  1.0  /  DBili  x   /  AST  19  /  ALT  40  /  AlkPhos  81  09-24      Urinalysis Basic - ( 24 Sep 2024 06:22 )    Color: x / Appearance: x / SG: x / pH: x  Gluc: 113 mg/dL / Ketone: x  / Bili: x / Urobili: x   Blood: x / Protein: x / Nitrite: x   Leuk Esterase: x / RBC: x / WBC x   Sq Epi: x / Non Sq Epi: x / Bacteria: x      CAPILLARY BLOOD GLUCOSE

## 2024-09-25 NOTE — PROGRESS NOTE ADULT - ASSESSMENT
MICA LE is a 70 y/o male RHD with a PMH of HTN, HLD who presents to [facility] on 9/19/24 with acute onset gait difficulties, left side weakness, slurred speech, intermittent dizziness, and diplopia secondary to R pontine and L callosal infarct. Hospital course significant for elevated LFTs/ cholelithiasis (to be managed as outpt). He is now admitted to  for a multidisciplinary rehab program for functional mobility, transfers and ADLs.    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  - CTH (9/19)- No hydrocephalus, acute intracranial hemorrhage, mass effect, or brain edema.  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   - TTE (9/20)- normal LV function  - S/P ILR for afib surveillance  - Aspirin 81mg daily  - Atorvastatin 80mg HS  - Start comprehensive rehab program, 3 hours a day, 5 days a week. OT PT SLP  - recreation therapy and psychology consult  - Precautions: cardiac, fall, vision, ILR    # HTN  - Losartan 100mg daily  - monitor vitals, hospitalist consult    # HLD  - Atorvastatin 80mg HS    # cholelithiasis  # Elevated LFTs  - CT C/A/P (9/23)- Cholelithiasis with nonspecific thickening of the gallbladder wall. Correlate with LFTs.  - monitor LFTs, avoid hepatotoxic meds  - outpatient GI follow up    # Sleep:   - Maintain quiet hours and low stim environment.  - Melatonin 6mg PRN to maximize participation in therapy during the day.     # Pain Management:  - Tylenol PRN    # GI/Bowel:  - Senna QHS, Miralax PRN Daily  - GI ppx: Pantoprazole 40mg    # /Bladder:   - PVR x1 on admission (SC if > 400)    # Skin/Pressure Injury:   - Skin assessment on admission: loop recorder site c/d/i    # Diet  - Regular- DASH    #  DVT ppx:  -  Lovenox 40mg daily    # Case discussed in IDT rounds 9/25 (initial):  - regular solid thin liquids, mild deficits quantitaive reasoning, set up eating, supervision oral hygiene, mod assist toileting and toilet transfers, mod assist bathing and dressing, independent bed mobility, CG transfers, ambulates 150 feet with min assist, 8 steps with 1 HR and min assist. Interested in art for rec therapy, retired olesya  - barriers: LUE weakness, visual perceptual deficits, reduced balance, coordination, dizziness  - goals: ambulate to commode with supervision, perform toileting tasks with set up (patient prefers not to rely on family)  - target: supervision with iADLs, set up toileting and dressing, supervision transfers, supervision ambulation. 10/10 [place] home [facility] caregiver training     # LABS  CBC CMP 9/24     ---------------  Code Status: FULL  Emergency Contact: SON: EDILMA [phone]    Outpatient Follow-up (Specialty/Name of physician):    Libman, Richard Benjamin  Neurology  [address] [facility] [address]  [place] [address]  Phone: [phone]  Fax: [phone]  Follow Up Time: 2 weeks    Edin Neves  Cardiology  [address] [facility], [address]  [place] [address]  Phone: [phone]  Fax: [phone]  Follow Up Time: 2 weeks    Felice Hazel  Cardiac Electrophysiology  [address] [place] [address], [id number]  [place] [address]  Phone: [phone]  Fax: [phone]  Follow Up Time: 2 weeks    Felice Herrera  Gastroenterology  [address] [place] [address]  Phone: [phone]  Fax: [phone]  Follow Up Time: 2 weeks MICA LE is a 68 y/o male RHD with a PMH of HTN, HLD who presents to [facility] on 9/19/24 with acute onset gait difficulties, left side weakness, slurred speech, intermittent dizziness, and diplopia secondary to R pontine and L callosal infarct. Hospital course significant for elevated LFTs/ cholelithiasis (to be managed as outpt). He is now admitted to [facility] for a multidisciplinary rehab program for functional mobility, transfers and ADLs.    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  - CTH (9/19)- No hydrocephalus, acute intracranial hemorrhage, mass effect, or brain edema.  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   - TTE (9/20)- normal LV function  - S/P ILR for afib surveillance  - Aspirin 81mg daily  - Atorvastatin 80mg HS  - Start comprehensive rehab program, 3 hours a day, 5 days a week. OT PT SLP  - recreation therapy and psychology consult  - Precautions: cardiac, fall, vision, ILR    # HTN  - Losartan 100mg daily  - monitor vitals, hospitalist consult  - (115/73 - 136/86  9/25)    # HLD  - Atorvastatin 80mg HS    # cholelithiasis  # Elevated LFTs  - CT C/A/P (9/23)- Cholelithiasis with nonspecific thickening of the gallbladder wall. Correlate with LFTs.  - monitor LFTs, avoid hepatotoxic meds  - outpatient GI follow up    # Sleep:   - Maintain quiet hours and low stim environment.  - Melatonin 6mg PRN to maximize participation in therapy during the day.     # Pain Management:  - Tylenol PRN    # GI/Bowel:  - Senna QHS, Miralax PRN Daily  - GI ppx: Pantoprazole 40mg    # /Bladder:   - PVR x1 on admission (SC if > 400)  - No concerns    # Skin/Pressure Injury:   - Skin assessment on admission: loop recorder site c/d/i    # Diet  - Regular- DASH    #  DVT ppx:  -  Lovenox 40mg daily    # Case discussed in IDT rounds 9/25 (initial):  - regular solid thin liquids, mild deficits quantitaive reasoning, set up eating, supervision oral hygiene, mod assist toileting and toilet transfers, mod assist bathing and dressing, independent bed mobility, CG transfers, ambulates 150 feet with min assist, 8 steps with 1 HR and min assist. Interested in art for rec therapy, retired olesya  - barriers: LUE weakness, visual perceptual deficits, reduced balance, coordination, dizziness  - goals: ambulate to commode with supervision, perform toileting tasks with set up (patient prefers not to rely on family)  - target: supervision with iADLs, set up toileting and dressing, supervision transfers, supervision ambulation. 10/10 [facility] caregiver training     # LABS  CBC CMP 9/26     ---------------  Code Status: FULL  Emergency Contact: SON: EDILMA [phone]    Outpatient Follow-up (Specialty/Name of physician):    Libman, Richard Benjamin  Neurology  [address] [facility] [place], [address]  [place] [address]  Phone: [phone]  Fax: [phone]  Follow Up Time: 2 weeks    Edin Neves  Cardiology  [address] [facility], [address]  [place] [address]  Phone: [phone]  Fax: [phone]  Follow Up Time: 2 weeks    Felice Hazel  Cardiac Electrophysiology  [address] [place] [address], [id number]  [place] [address]  Phone: [phone]  Fax: [phone]  Follow Up Time: 2 weeks    Felice Herrera  Gastroenterology  [address] [place] [address]  Phone: [phone]  Fax: [phone]  Follow Up Time: 2 weeks

## 2024-09-26 LAB
ALBUMIN SERPL ELPH-MCNC: 3.8 G/DL — SIGNIFICANT CHANGE UP (ref 3.3–5)
ALP SERPL-CCNC: 102 U/L — SIGNIFICANT CHANGE UP (ref 40–120)
ALT FLD-CCNC: 177 U/L — HIGH (ref 10–45)
ANION GAP SERPL CALC-SCNC: 9 MMOL/L — SIGNIFICANT CHANGE UP (ref 5–17)
AST SERPL-CCNC: 104 U/L — HIGH (ref 10–40)
BASOPHILS # BLD AUTO: 0.05 K/UL — SIGNIFICANT CHANGE UP (ref 0–0.2)
BASOPHILS NFR BLD AUTO: 0.7 % — SIGNIFICANT CHANGE UP (ref 0–2)
BILIRUB SERPL-MCNC: 0.8 MG/DL — SIGNIFICANT CHANGE UP (ref 0.2–1.2)
BUN SERPL-MCNC: 18 MG/DL — SIGNIFICANT CHANGE UP (ref 7–23)
CALCIUM SERPL-MCNC: 8.9 MG/DL — SIGNIFICANT CHANGE UP (ref 8.4–10.5)
CHLORIDE SERPL-SCNC: 101 MMOL/L — SIGNIFICANT CHANGE UP (ref 96–108)
CO2 SERPL-SCNC: 26 MMOL/L — SIGNIFICANT CHANGE UP (ref 22–31)
CREAT SERPL-MCNC: 0.89 MG/DL — SIGNIFICANT CHANGE UP (ref 0.5–1.3)
EGFR: 92 ML/MIN/1.73M2 — SIGNIFICANT CHANGE UP
EOSINOPHIL # BLD AUTO: 0.06 K/UL — SIGNIFICANT CHANGE UP (ref 0–0.5)
EOSINOPHIL NFR BLD AUTO: 0.8 % — SIGNIFICANT CHANGE UP (ref 0–6)
GLUCOSE SERPL-MCNC: 111 MG/DL — HIGH (ref 70–99)
HCT VFR BLD CALC: 45.2 % — SIGNIFICANT CHANGE UP (ref 39–50)
HGB BLD-MCNC: 14.8 G/DL — SIGNIFICANT CHANGE UP (ref 13–17)
IMM GRANULOCYTES NFR BLD AUTO: 0.6 % — SIGNIFICANT CHANGE UP (ref 0–0.9)
LYMPHOCYTES # BLD AUTO: 1.67 K/UL — SIGNIFICANT CHANGE UP (ref 1–3.3)
LYMPHOCYTES # BLD AUTO: 23.3 % — SIGNIFICANT CHANGE UP (ref 13–44)
MCHC RBC-ENTMCNC: 28.3 PG — SIGNIFICANT CHANGE UP (ref 27–34)
MCHC RBC-ENTMCNC: 32.7 GM/DL — SIGNIFICANT CHANGE UP (ref 32–36)
MCV RBC AUTO: 86.4 FL — SIGNIFICANT CHANGE UP (ref 80–100)
MONOCYTES # BLD AUTO: 0.46 K/UL — SIGNIFICANT CHANGE UP (ref 0–0.9)
MONOCYTES NFR BLD AUTO: 6.4 % — SIGNIFICANT CHANGE UP (ref 2–14)
NEUTROPHILS # BLD AUTO: 4.88 K/UL — SIGNIFICANT CHANGE UP (ref 1.8–7.4)
NEUTROPHILS NFR BLD AUTO: 68.2 % — SIGNIFICANT CHANGE UP (ref 43–77)
NRBC # BLD: 0 /100 WBCS — SIGNIFICANT CHANGE UP (ref 0–0)
PLATELET # BLD AUTO: 249 K/UL — SIGNIFICANT CHANGE UP (ref 150–400)
POTASSIUM SERPL-MCNC: 3.7 MMOL/L — SIGNIFICANT CHANGE UP (ref 3.5–5.3)
POTASSIUM SERPL-SCNC: 3.7 MMOL/L — SIGNIFICANT CHANGE UP (ref 3.5–5.3)
PROT SERPL-MCNC: 7.4 G/DL — SIGNIFICANT CHANGE UP (ref 6–8.3)
RBC # BLD: 5.23 M/UL — SIGNIFICANT CHANGE UP (ref 4.2–5.8)
RBC # FLD: 13.2 % — SIGNIFICANT CHANGE UP (ref 10.3–14.5)
SODIUM SERPL-SCNC: 136 MMOL/L — SIGNIFICANT CHANGE UP (ref 135–145)
WBC # BLD: 7.16 K/UL — SIGNIFICANT CHANGE UP (ref 3.8–10.5)
WBC # FLD AUTO: 7.16 K/UL — SIGNIFICANT CHANGE UP (ref 3.8–10.5)

## 2024-09-26 PROCEDURE — 99232 SBSQ HOSP IP/OBS MODERATE 35: CPT

## 2024-09-26 PROCEDURE — 90832 PSYTX W PT 30 MINUTES: CPT

## 2024-09-26 RX ORDER — ATORVASTATIN CALCIUM 10 MG/1
40 TABLET, FILM COATED ORAL AT BEDTIME
Refills: 0 | Status: DISCONTINUED | OUTPATIENT
Start: 2024-09-26 | End: 2024-10-10

## 2024-09-26 RX ADMIN — ATORVASTATIN CALCIUM 40 MILLIGRAM(S): 10 TABLET, FILM COATED ORAL at 21:32

## 2024-09-26 RX ADMIN — PANTOPRAZOLE SODIUM 40 MILLIGRAM(S): 40 TABLET, DELAYED RELEASE ORAL at 05:49

## 2024-09-26 RX ADMIN — Medication 2 TABLET(S): at 21:32

## 2024-09-26 RX ADMIN — Medication 81 MILLIGRAM(S): at 12:14

## 2024-09-26 RX ADMIN — ENOXAPARIN SODIUM 40 MILLIGRAM(S): 150 INJECTION SUBCUTANEOUS at 12:15

## 2024-09-26 RX ADMIN — LOSARTAN POTASSIUM 100 MILLIGRAM(S): 100 TABLET, FILM COATED ORAL at 05:49

## 2024-09-26 NOTE — PROGRESS NOTE ADULT - ASSESSMENT
68 y/o male RHD with a PMH of HTN, HLD who presents to [facility] on 9/19/24 with acute onset gait difficulties, left side weakness, slurred speech, intermittent dizziness, and diplopia secondary to R pontine and L callosal infarct. Hospital course significant for elevated LFTs/ cholelithiasis (to be managed as outpt).    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  - TTE (9/20)- normal LV function  - S/P ILR for afib surveillance  - Aspirin 81mg daily  - Atorvastatin dose will be decreased to 40mg HS due to increasing LFT's    # Transaminitis  - with h/o cholelithiasis. Asymptomatic  - decrease the dose of Lipitor to 40 mg hs and monitor    # HTN  - Losartan 100mg daily    # HLD  - Atorvastatin 80mg HS    # cholelithiasis  # Elevated LFTs  - CT C/A/P (9/23)- Cholelithiasis with nonspecific thickening of the gallbladder wall. Correlate with LFTs.  - avoid hepatotoxic meds  -elevated LFT'a but clinically pt is asymptomatic. No c/o abd pain, N/V  - outpatient GI follow up    DVT ppx- Lovenox

## 2024-09-26 NOTE — PROGRESS NOTE ADULT - ATTENDING COMMENTS
Progress note amended to include my discussions with patient, resident, hospitalist, RN, SW, PT and my findings. Wife also present. Seen in SLP and with assistance of Mandarin language line  # [id number]    Patient doing well. Seems in better spirits than yesterday. Only complaint is weakness in left arm, although appears to be moving it better (increased left shoulder FF/improved gross motor control). He denies any H/A, visual complaints, abdominal discomfort, N/V. We discussed that his liver functions were elevated today, and that potential culprit is statin. Dose statin reduced from 80 to 40 and tylenol held. Will repeat LFTs in AM, if continues to uptrend, possible sono and additional workup. Patient agreeable. Abdominal exam benign, soft, no TTP, no HSM +BS.    Vitals and remaining labs stable    Wife would like patient's PCP to have copy of medical records on dc. Will call office for HIPPA release form to be sent over. Continue program      RECENT LABS    Vital Signs Last 24 Hrs  T(C): 36.5 (26 Sep 2024 07:54), Max: 36.5 (26 Sep 2024 07:54)  T(F): 97.7 (26 Sep 2024 07:54), Max: 97.7 (26 Sep 2024 07:54)  HR: 71 (26 Sep 2024 07:54) (61 - 71)  BP: 108/65 (26 Sep 2024 07:54) (108/65 - 131/72)  BP(mean): --  RR: 16 (26 Sep 2024 07:54) (16 - 16)  SpO2: 96% (26 Sep 2024 07:54) (96% - 96%)    Parameters below as of 26 Sep 2024 07:54  Patient On (Oxygen Delivery Method): room air                              14.8   7.16  )-----------( 249      ( 26 Sep 2024 06:20 )             45.2     09-26    136  |  101  |  18  ----------------------------<  111[H]  3.7   |  26  |  0.89    Ca    8.9      26 Sep 2024 06:20    TPro  7.4  /  Alb  3.8  /  TBili  0.8  /  DBili  x   /  AST  104[H]  /  ALT  177[H]  /  AlkPhos  102  09-26      Urinalysis Basic - ( 26 Sep 2024 06:20 )    Color: x / Appearance: x / SG: x / pH: x  Gluc: 111 mg/dL / Ketone: x  / Bili: x / Urobili: x   Blood: x / Protein: x / Nitrite: x   Leuk Esterase: x / RBC: x / WBC x   Sq Epi: x / Non Sq Epi: x / Bacteria: x      CAPILLARY BLOOD GLUCOSE Progress note amended to include my discussions with patient, resident, hospitalist, RN, SW, PT and my findings. Wife also present. Seen in SLP and with assistance of Mandarin language line  # [id number]    Patient doing well. Seems in better spirits than yesterday. Only complaint is weakness in left arm, although appears to be moving it better (increased left shoulder FF/improved gross motor control). He denies any H/A, visual complaints, abdominal discomfort, N/V. We discussed that his liver functions were elevated today, and that potential culprit is statin. Dose statin reduced from 80 to 40 and tylenol held. Will repeat LFTs in AM, if continues to uptrend, possible sono and additional workup. Patient agreeable. Abdominal exam benign, soft, no TTP, no HSM +BS.    Vitals and remaining labs stable    Wife would like patient's PCP to have copy of medical records on dc. Will ask patinet/family to complete HIPPA release form to be sent over. Continue program      RECENT LABS    Vital Signs Last 24 Hrs  T(C): 36.5 (26 Sep 2024 07:54), Max: 36.5 (26 Sep 2024 07:54)  T(F): 97.7 (26 Sep 2024 07:54), Max: 97.7 (26 Sep 2024 07:54)  HR: 71 (26 Sep 2024 07:54) (61 - 71)  BP: 108/65 (26 Sep 2024 07:54) (108/65 - 131/72)  BP(mean): --  RR: 16 (26 Sep 2024 07:54) (16 - 16)  SpO2: 96% (26 Sep 2024 07:54) (96% - 96%)    Parameters below as of 26 Sep 2024 07:54  Patient On (Oxygen Delivery Method): room air                              14.8   7.16  )-----------( 249      ( 26 Sep 2024 06:20 )             45.2     09-26    136  |  101  |  18  ----------------------------<  111[H]  3.7   |  26  |  0.89    Ca    8.9      26 Sep 2024 06:20    TPro  7.4  /  Alb  3.8  /  TBili  0.8  /  DBili  x   /  AST  104[H]  /  ALT  177[H]  /  AlkPhos  102  09-26      Urinalysis Basic - ( 26 Sep 2024 06:20 )    Color: x / Appearance: x / SG: x / pH: x  Gluc: 111 mg/dL / Ketone: x  / Bili: x / Urobili: x   Blood: x / Protein: x / Nitrite: x   Leuk Esterase: x / RBC: x / WBC x   Sq Epi: x / Non Sq Epi: x / Bacteria: x      CAPILLARY BLOOD GLUCOSE

## 2024-09-26 NOTE — PROGRESS NOTE ADULT - ASSESSMENT
MICA LE is a 70 y/o male RHD with a PMH of HTN, HLD who presents to [facility] on 9/19/24 with acute onset gait difficulties, left side weakness, slurred speech, intermittent dizziness, and diplopia secondary to R pontine and L callosal infarct. Hospital course significant for elevated LFTs/ cholelithiasis (to be managed as outpt). He is now admitted to [facility] for a multidisciplinary rehab program for functional mobility, transfers and ADLs.    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  - CTH (9/19)- No hydrocephalus, acute intracranial hemorrhage, mass effect, or brain edema.  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   - TTE (9/20)- normal LV function  - S/P ILR for afib surveillance  - Aspirin 81mg daily  - Decreased atorvastatin  to 40mg HS due to elevated LFTS  - Start comprehensive rehab program, 3 hours a day, 5 days a week. OT PT SLP  - recreation therapy and psychology consult  - Precautions: cardiac, fall, vision, ILR    # HTN  - Losartan 100mg daily  - monitor vitals, hospitalist consult  - (108/65 - 131/72  9/25)    # HLD  - Decreased to Atorvastatin 40mg HS due to elevated LFTS    # cholelithiasis  # Elevated LFTs  - CT C/A/P (9/23)- Cholelithiasis with nonspecific thickening of the gallbladder wall. Correlate with LFTs.  - acute elevated today, repeat CMP 9/27  - avoid hepatotoxic meds, decreased atorvastatin and DC tyelnol  - outpatient GI follow up    # Sleep:   - Maintain quiet hours and low stim environment.  - Melatonin 6mg PRN to maximize participation in therapy during the day.     # Pain Management:  - Tylenol stopped    # GI/Bowel:  - Senna QHS, Miralax PRN Daily  - GI ppx: Pantoprazole 40mg    # /Bladder:   - PVR x1 on admission (SC if > 400)  - No concerns    # Skin/Pressure Injury:   - Skin assessment on admission: loop recorder site c/d/i    # Diet  - Regular- DASH    #  DVT ppx:  -  Lovenox 40mg daily    # Case discussed in IDT rounds 9/25 (initial):  - regular solid thin liquids, mild deficits quantitaive reasoning, set up eating, supervision oral hygiene, mod assist toileting and toilet transfers, mod assist bathing and dressing, independent bed mobility, CG transfers, ambulates 150 feet with min assist, 8 steps with 1 HR and min assist. Interested in art for rec therapy, retired olesya  - barriers: LUE weakness, visual perceptual deficits, reduced balance, coordination, dizziness  - goals: ambulate to commode with supervision, perform toileting tasks with set up (patient prefers not to rely on family)  - target: supervision with iADLs, set up toileting and dressing, supervision transfers, supervision ambulation. 10/10 [place] home [facility] caregiver training     # LABS   CMP 9/27   CBC CMP on 9/30    ---------------  Code Status: FULL  Emergency Contact: SON: EDILMA [phone]    Outpatient Follow-up (Specialty/Name of physician):    Libman, Richard Benjamin  Neurology  [address] [facility] [address]  [place] [address]  Phone: [phone]  Fax: [phone]  Follow Up Time: 2 weeks    Edin Neves  Cardiology  [address] [facility], [address]  [place] [address]  Phone: [phone]  Fax: [phone]  Follow Up Time: 2 weeks    Felice aHzel  Cardiac Electrophysiology  [address] [place] [address], [address]  [place] [address]  Phone: [phone]  Fax: [phone]  Follow Up Time: 2 weeks    Felice Herrera  Gastroenterology  [address] [place] [address]  Phone: [phone]  Fax: [phone]  Follow Up Time: 2 weeks MICA LE is a 68 y/o male RHD with a PMH of HTN, HLD who presents to [facility] on 24 with acute onset gait difficulties, left side weakness, slurred speech, intermittent dizziness, and diplopia secondary to R pontine and L callosal infarct. Hospital course significant for elevated LFTs/ cholelithiasis (to be managed as outpt).    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  - CTH ()- No hydrocephalus, acute intracranial hemorrhage, mass effect, or brain edema.  - MR Head  ()- : Acute/subacute infarction within the right side of the elba as well as left lateral genu of the corpus callosum with associated cytotoxic edema and without associated hemorrhage.   - TTE ()- normal LV function  - S/P ILR for afib surveillance  - Aspirin 81mg daily  - Decreased atorvastatin  to 40mg HS due to elevated LFTS  - continue comprehensive rehab program, 3 hours a day, 5 days a week. OT PT SLP  - recreation therapy and psychology consult  - Precautions: cardiac, fall, vision, ILR    # HTN  - Losartan 100mg daily  - BP ) (108/65 - 131/72)     # HLD  - Decreased to Atorvastatin 40mg HS     # cholelithiasis  # Elevated LFTs  - CT C/A/P ()- Cholelithiasis with nonspecific thickening of the gallbladder wall. Correlate with LFTs.  - acute elevated today, AST  104[H]  /  ALT  177[H]  /  AlkPhos  102  -. repeat CMP   - avoid hepatotoxic meds, decreased atorvastatin and DC tylenol. Reviewed with patient  - outpatient GI follow up    # Sleep:   - Maintain quiet hours and low stim environment.  - Melatonin 6mg PRN to maximize participation in therapy during the day.     # Pain Management:  - Tylenol stopped    # GI/Bowel:  - Senna QHS, Miralax PRN Daily  - GI ppx: Pantoprazole 40mg    # /Bladder:   - PVR x1 on admission (SC if > 400)  - No concerns    # Skin/Pressure Injury:   - Skin assessment on admission: loop recorder site c/d/i    # Diet  - Regular- DASH    #  DVT ppx:  -  Lovenox 40mg daily    # Case discussed in IDT rounds  (initial):  - regular solid thin liquids, mild deficits quantitaive reasoning, set up eating, supervision oral hygiene, mod assist toileting and toilet transfers, mod assist bathing and dressing, independent bed mobility, CG transfers, ambulates 150 feet with min assist, 8 steps with 1 HR and min assist. Interested in art for rec therapy, retired olesya  - barriers: LUE weakness, visual perceptual deficits, reduced balance, coordination, dizziness  - goals: ambulate to commode with supervision, perform toileting tasks with set up (patient prefers not to rely on family)  - target: supervision with iADLs, set up toileting and dressing, supervision transfers, supervision ambulation. 10/10 dc home [facility] caregiver training     # LABS   CMP    CBC CMP on     ---------------  Code Status: FULL  Emergency Contact: SON: EDILMA [phone]    PCP Dr Lynn: [phone]    Outpatient Follow-up (Specialty/Name of physician):    Libman, Richard Benjamin  Neurology  [address] [facility] [address]  [place] [address]  Phone: [phone]  Fax: [phone]  Follow Up Time: 2 weeks    Edin Neves  Cardiology  [address] [facility], [address]  [place] [address]  Phone: [phone]  Fax: [phone]  Follow Up Time: 2 weeks    Felice Hazel  Cardiac Electrophysiology  [address] [place] [address], [address]  [place] [address]  Phone: [phone]  Fax: [phone]  Follow Up Time: 2 weeks    Felice Herrera  Gastroenterology  [address] [place] [address]  Phone: [phone]  Fax: [phone]  Follow Up Time: 2 weeks

## 2024-09-26 NOTE — PROGRESS NOTE ADULT - ASSESSMENT
Pt alert, attentive, relatively intact expressive language, thought processes - goal-directed, no abnormal thought contents noted, depressed affect, euthymic mood, denied AH/VH, denied SI/HI/I/P, calm behavior. Plan: Continue the assessment process.

## 2024-09-26 NOTE — PROGRESS NOTE ADULT - SUBJECTIVE AND OBJECTIVE BOX
CC: Patient is a 69y old  Male who presents with a chief complaint of R pontine and L callosal infarct (26 Sep 2024 10:53)      Interval History:  Patient seen and examined at bedside.  No overnight events  No complaints this morning    ALLERGIES:  grass (Sneezing)  No Known Drug Allergies  dust (Sneezing)    MEDICATIONS  (STANDING):  aspirin  chewable 81 milliGRAM(s) Oral daily  atorvastatin 40 milliGRAM(s) Oral at bedtime  enoxaparin Injectable 40 milliGRAM(s) SubCutaneous every 24 hours  losartan 100 milliGRAM(s) Oral daily  pantoprazole    Tablet 40 milliGRAM(s) Oral before breakfast  polyethylene glycol 3350 17 Gram(s) Oral daily  senna 2 Tablet(s) Oral at bedtime    MEDICATIONS  (PRN):  melatonin 6 milliGRAM(s) Oral at bedtime PRN Insomnia    Vital Signs Last 24 Hrs  T(F): 97.7 (26 Sep 2024 07:54), Max: 97.7 (26 Sep 2024 07:54)  HR: 71 (26 Sep 2024 07:54) (61 - 71)  BP: 108/65 (26 Sep 2024 07:54) (108/65 - 131/72)  RR: 16 (26 Sep 2024 07:54) (16 - 16)  SpO2: 96% (26 Sep 2024 07:54) (96% - 96%)  I&O's Summary    25 Sep 2024 07:01  -  26 Sep 2024 07:00  --------------------------------------------------------  IN: 0 mL / OUT: 250 mL / NET: -250 mL      BMI (kg/m2): 22.1 (09-23-24 @ 19:21)    PHYSICAL EXAM:  GENERAL: NAD  NERVOUS SYSTEM:  AAO x 3, follows commands  CHEST/LUNG: Clear to auscultation bilaterally; No rales, rhonchi, wheezing,   HEART: Regular rate and rhythm; No murmurs, rubs, or gallops; No pitting edema  ABDOMEN: Soft, Nontender, Nondistended; Bowel sounds present; No HSM or masses  MUSCULOSKELETAL/EXTREMITIES:  2+ Peripheral Pulses, No clubbing or digital cyanosis;  PSYCH: Appropriate affect, Alert & Oriented x 3, Good Memory; Good insight    LABS:                        14.8   7.16  )-----------( 249      ( 26 Sep 2024 06:20 )             45.2       09-26    136  |  101  |  18  ----------------------------<  111  3.7   |  26  |  0.89    Ca    8.9      26 Sep 2024 06:20    TPro  7.4  /  Alb  3.8  /  TBili  0.8  /  DBili  x   /  AST  104  /  ALT  177  /  AlkPhos  102  09-26                09-20 Chol 175 mg/dL LDL -- HDL 33 mg/dL Trig 240 mg/dL                  Urinalysis Basic - ( 26 Sep 2024 06:20 )    Color: x / Appearance: x / SG: x / pH: x  Gluc: 111 mg/dL / Ketone: x  / Bili: x / Urobili: x   Blood: x / Protein: x / Nitrite: x   Leuk Esterase: x / RBC: x / WBC x   Sq Epi: x / Non Sq Epi: x / Bacteria: x        COVID-19 PCR: NotDetec (09-23-24 @ 19:52)      Care Discussed with Consultants/Other Providers: Yes

## 2024-09-26 NOTE — PROGRESS NOTE ADULT - SUBJECTIVE AND OBJECTIVE BOX
Patient is a 69y old  Male who presents with a chief complaint of R pontine and L callosal infarct (25 Sep 2024 11:38)    HPI:  70 y/o male RH-dominant, with a PMH of HTN, HLD who presents to [facility] on 9/19/24 with acute onset gait difficulties, left side weakness, slurred speech, intermittent dizziness, and diplopia. LKN is 0700 am 9/18. 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 TTE showed normal LV function. Patient was not a candidate for TNK as he was outside of appropriate window, nor mechanical thrombectomy as there was no LVO. Aspirin and high dose statin was initiated.    Hospital course significant for elevated LFTs, CT C/A/P showed cholelithiasis, patient is recommended to f/u with outpatient GI as he is asymptomatic.     Patient optimized and was evaluated by PM&R and therapy for functional deficits, gait/ADL impairments and acute rehabilitation was recommended. Patient was cleared for discharge to [facility] IRF on 9/23/24. (23 Sep 2024 15:20)      SUBJECTIVE  Interval History:  Patient seen and examined with SLP this morning. He notes occasional headaches but no other complaints. No acute events overnight. Patient LFT elevated today and atorvastatin was decrease, no liver history of worry on clinical exam. Wife asks that we send hospital record to his primary.     Allergies    grass (Sneezing)  No Known Drug Allergies  dust (Sneezing)    Intolerances        OBJECTIVE    69y  Vital Signs Last 24 Hrs  T(C): 36.5 (26 Sep 2024 07:54), Max: 36.5 (26 Sep 2024 07:54)  T(F): 97.7 (26 Sep 2024 07:54), Max: 97.7 (26 Sep 2024 07:54)  HR: 71 (26 Sep 2024 07:54) (61 - 71)  BP: 108/65 (26 Sep 2024 07:54) (108/65 - 131/72)  BP(mean): --  RR: 16 (26 Sep 2024 07:54) (16 - 16)  SpO2: 96% (26 Sep 2024 07:54) (96% - 96%)    Parameters below as of 26 Sep 2024 07:54  Patient On (Oxygen Delivery Method): room air      Daily     Daily       MEDICATIONS  (STANDING):  aspirin  chewable 81 milliGRAM(s) Oral daily  atorvastatin 40 milliGRAM(s) Oral at bedtime  enoxaparin Injectable 40 milliGRAM(s) SubCutaneous every 24 hours  losartan 100 milliGRAM(s) Oral daily  pantoprazole    Tablet 40 milliGRAM(s) Oral before breakfast  polyethylene glycol 3350 17 Gram(s) Oral daily  senna 2 Tablet(s) Oral at bedtime    MEDICATIONS  (PRN):  melatonin 6 milliGRAM(s) Oral at bedtime PRN Insomnia      RECENT LABS:                          14.8   7.16  )-----------( 249      ( 26 Sep 2024 06:20 )             45.2     09-26    136  |  101  |  18  ----------------------------<  111[H]  3.7   |  26  |  0.89    Ca    8.9      26 Sep 2024 06:20    TPro  7.4  /  Alb  3.8  /  TBili  0.8  /  DBili  x   /  AST  104[H]  /  ALT  177[H]  /  AlkPhos  102  09-26    LIVER FUNCTIONS - ( 26 Sep 2024 06:20 )  Alb: 3.8 g/dL / Pro: 7.4 g/dL / ALK PHOS: 102 U/L / ALT: 177 U/L / AST: 104 U/L / GGT: x             Urinalysis Basic - ( 26 Sep 2024 06:20 )    Color: x / Appearance: x / SG: x / pH: x  Gluc: 111 mg/dL / Ketone: x  / Bili: x / Urobili: x   Blood: x / Protein: x / Nitrite: x   Leuk Esterase: x / RBC: x / WBC x   Sq Epi: x / Non Sq Epi: x / Bacteria: x        CAPILLARY BLOOD GLUCOSE               Patient is a 69y old  Male who presents with a chief complaint of R pontine and L callosal infarct (25 Sep 2024 11:38)    HPI:  70 y/o male RH-dominant, with a PMH of HTN, HLD who presents to [facility] on 9/19/24 with acute onset gait difficulties, left side weakness, slurred speech, intermittent dizziness, and diplopia. LKN is 0700 am 9/18. 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 TTE showed normal LV function. Patient was not a candidate for TNK as he was outside of appropriate window, nor mechanical thrombectomy as there was no LVO. Aspirin and high dose statin was initiated.    Hospital course significant for elevated LFTs, CT C/A/P showed cholelithiasis, patient is recommended to f/u with outpatient GI as he is asymptomatic.     Patient optimized and was evaluated by PM&R and therapy for functional deficits, gait/ADL impairments and acute rehabilitation was recommended. Patient was cleared for discharge to [facility] IRF on 9/23/24. (23 Sep 2024 15:20)      SUBJECTIVE  Interval History:  Patient seen and examined with SLP this morning. He notes occasional headaches but no other complaints. No acute events overnight. Patient LFT elevated today and atorvastatin was decrease, no liver history of worry on clinical exam. Wife asks that we send hospital record to his primary.     Allergies    grass (Sneezing)  No Known Drug Allergies  dust (Sneezing)    Intolerances        OBJECTIVE    69y  Vital Signs Last 24 Hrs  T(C): 36.5 (26 Sep 2024 07:54), Max: 36.5 (26 Sep 2024 07:54)  T(F): 97.7 (26 Sep 2024 07:54), Max: 97.7 (26 Sep 2024 07:54)  HR: 71 (26 Sep 2024 07:54) (61 - 71)  BP: 108/65 (26 Sep 2024 07:54) (108/65 - 131/72)  BP(mean): --  RR: 16 (26 Sep 2024 07:54) (16 - 16)  SpO2: 96% (26 Sep 2024 07:54) (96% - 96%)    Parameters below as of 26 Sep 2024 07:54  Patient On (Oxygen Delivery Method): room air      Daily     Daily       MEDICATIONS  (STANDING):  aspirin  chewable 81 milliGRAM(s) Oral daily  atorvastatin 40 milliGRAM(s) Oral at bedtime  enoxaparin Injectable 40 milliGRAM(s) SubCutaneous every 24 hours  losartan 100 milliGRAM(s) Oral daily  pantoprazole    Tablet 40 milliGRAM(s) Oral before breakfast  polyethylene glycol 3350 17 Gram(s) Oral daily  senna 2 Tablet(s) Oral at bedtime    MEDICATIONS  (PRN):  melatonin 6 milliGRAM(s) Oral at bedtime PRN Insomnia      RECENT LABS:                          14.8   7.16  )-----------( 249      ( 26 Sep 2024 06:20 )             45.2     09-26    136  |  101  |  18  ----------------------------<  111[H]  3.7   |  26  |  0.89    Ca    8.9      26 Sep 2024 06:20    TPro  7.4  /  Alb  3.8  /  TBili  0.8  /  DBili  x   /  AST  104[H]  /  ALT  177[H]  /  AlkPhos  102  09-26    LIVER FUNCTIONS - ( 26 Sep 2024 06:20 )  Alb: 3.8 g/dL / Pro: 7.4 g/dL / ALK PHOS: 102 U/L / ALT: 177 U/L / AST: 104 U/L / GGT: x             Urinalysis Basic - ( 26 Sep 2024 06:20 )    Color: x / Appearance: x / SG: x / pH: x  Gluc: 111 mg/dL / Ketone: x  / Bili: x / Urobili: x   Blood: x / Protein: x / Nitrite: x   Leuk Esterase: x / RBC: x / WBC x   Sq Epi: x / Non Sq Epi: x / Bacteria: x        CAPILLARY BLOOD GLUCOSE

## 2024-09-26 NOTE — PROGRESS NOTE ADULT - SUBJECTIVE AND OBJECTIVE BOX
Pt was seen for 30 minutes for supportive tx; his wife was present during the meeting. Pt c/o depressed mood, anxiety. Reviewed chart, approached Pt for an initial consult, introduced the role of neuropsychology in the rehab team, and explained the nature and purpose of the consult. Pt is a 70 y/o male with a PMHx of HTN, HLD who presents to [facility] on 9/19/24 with acute onset gait difficulties, left side weakness, slurred speech, intermittent dizziness, and diplopia secondary to R pontine and L callosal infarct. Hospital course significant for elevated LFTs/ cholelithiasis (to be managed as outpt). He is now admitted to [facility] for a multidisciplinary rehab program for functional mobility, transfers and ADLs. Pt agreed to participate, he was well-related, and candidly discussed his current medical condition and associated deficits. Session focused on establishing rapport with Pt, collecting pertinent biographical information, and assessing his current emotional functioning. Pt provided sufficient information about his medical hx and social hx. Pt additionally answered all questions during the clinical interview in order to elicit emotional symptoms. Pt reported experiencing depressed mood, anxiety, low energy and fatigue during the past week. Pt expressed concern about the length of his stay and availability of services from home; Pt was referred to  for further information. Support and encouragement were provided.

## 2024-09-27 LAB
ALBUMIN SERPL ELPH-MCNC: 3.7 G/DL — SIGNIFICANT CHANGE UP (ref 3.3–5)
ALP SERPL-CCNC: 96 U/L — SIGNIFICANT CHANGE UP (ref 40–120)
ALT FLD-CCNC: 125 U/L — HIGH (ref 10–45)
AST SERPL-CCNC: 58 U/L — HIGH (ref 10–40)
BILIRUB DIRECT SERPL-MCNC: 0.1 MG/DL — SIGNIFICANT CHANGE UP (ref 0–0.3)
BILIRUB INDIRECT FLD-MCNC: 0.5 MG/DL — SIGNIFICANT CHANGE UP (ref 0.2–1)
BILIRUB SERPL-MCNC: 0.6 MG/DL — SIGNIFICANT CHANGE UP (ref 0.2–1.2)
PROT SERPL-MCNC: 7.4 G/DL — SIGNIFICANT CHANGE UP (ref 6–8.3)

## 2024-09-27 PROCEDURE — 99232 SBSQ HOSP IP/OBS MODERATE 35: CPT

## 2024-09-27 RX ADMIN — Medication 2 TABLET(S): at 20:37

## 2024-09-27 RX ADMIN — Medication 17 GRAM(S): at 12:13

## 2024-09-27 RX ADMIN — Medication 81 MILLIGRAM(S): at 12:13

## 2024-09-27 RX ADMIN — ENOXAPARIN SODIUM 40 MILLIGRAM(S): 150 INJECTION SUBCUTANEOUS at 12:14

## 2024-09-27 RX ADMIN — PANTOPRAZOLE SODIUM 40 MILLIGRAM(S): 40 TABLET, DELAYED RELEASE ORAL at 05:46

## 2024-09-27 RX ADMIN — ATORVASTATIN CALCIUM 40 MILLIGRAM(S): 10 TABLET, FILM COATED ORAL at 20:37

## 2024-09-27 RX ADMIN — LOSARTAN POTASSIUM 100 MILLIGRAM(S): 100 TABLET, FILM COATED ORAL at 05:46

## 2024-09-27 NOTE — PROGRESS NOTE ADULT - ATTENDING COMMENTS
Progress note amended to include my discussions with patient, resident, hospitalist, RN, SW, PT and my findings    Patient seen with wife at bedside and Mandarin language line  Yasmeen #[id number]    Patient still frustrated over left arm weakness (endorses he doesn't feel it's improving although he seems to have better overall control and proximal movement, grasping with left hand). Although he is irritable, he is compliant and participatory in therapy, and no complaints of sleep disturbance, good appetite. Denies H/A, dizziness/vertigo, N/V, CP, abdominal pain, and states he has no questions. We reviewed lab work from this morning, including improving LFts. We discussed that next blood draw will be Monday, and if normalized, can talk to hospitalist about increasing statin dose. Patient states he prefers higher dose of medication .    He also reports some itching scalp, thomas left side, +sig dandruff. Wife asks for cream; will order ketoconazole shampoo x 2 doses, patient and family agreeable.    Continue current program.    RECENT LABS    Vital Signs Last 24 Hrs  T(C): 36.7 (27 Sep 2024 09:45), Max: 36.8 (26 Sep 2024 21:30)  T(F): 98 (27 Sep 2024 09:45), Max: 98.2 (26 Sep 2024 21:30)  HR: 81 (27 Sep 2024 09:45) (60 - 81)  BP: 129/82 (27 Sep 2024 09:45) (123/72 - 129/82)  BP(mean): --  RR: 16 (27 Sep 2024 09:45) (16 - 16)  SpO2: 97% (27 Sep 2024 09:45) (97% - 97%)    Parameters below as of 27 Sep 2024 09:45  Patient On (Oxygen Delivery Method): room air                              14.8   7.16  )-----------( 249      ( 26 Sep 2024 06:20 )             45.2     09-26    136  |  101  |  18  ----------------------------<  111[H]  3.7   |  26  |  0.89    Ca    8.9      26 Sep 2024 06:20    TPro  7.4  /  Alb  3.7  /  TBili  0.6  /  DBili  0.1  /  AST  58[H]  /  ALT  125[H]  /  AlkPhos  96  09-27      Urinalysis Basic - ( 26 Sep 2024 06:20 )    Color: x / Appearance: x / SG: x / pH: x  Gluc: 111 mg/dL / Ketone: x  / Bili: x / Urobili: x   Blood: x / Protein: x / Nitrite: x   Leuk Esterase: x / RBC: x / WBC x   Sq Epi: x / Non Sq Epi: x / Bacteria: x      CAPILLARY BLOOD GLUCOSE

## 2024-09-27 NOTE — PROGRESS NOTE ADULT - SUBJECTIVE AND OBJECTIVE BOX
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 on 9/19/24 with acute onset gait difficulties, left side weakness, slurred speech, intermittent dizziness, and diplopia. LKN is 0700 am 9/18. 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 TTE showed normal LV function. Patient was not a candidate for TNK as he was outside of appropriate window, nor mechanical thrombectomy as there was no LVO. Aspirin and high dose statin was initiated.    Hospital course significant for elevated LFTs, CT C/A/P showed cholelithiasis, patient is recommended to f/u with outpatient GI as he is asymptomatic.     Patient optimized and was evaluated by PM&R and therapy for functional deficits, gait/ADL impairments and acute rehabilitation was recommended. Patient was cleared for discharge to [facility] IRF on 9/23/24. (23 Sep 2024 15:20)      SUBJECTIVE  Interval History:      Allergies    grass (Sneezing)  No Known Drug Allergies  dust (Sneezing)    Intolerances        OBJECTIVE    69y  Vital Signs Last 24 Hrs  T(C): 36.8 (26 Sep 2024 21:30), Max: 36.8 (26 Sep 2024 21:30)  T(F): 98.2 (26 Sep 2024 21:30), Max: 98.2 (26 Sep 2024 21:30)  HR: 60 (27 Sep 2024 05:45) (60 - 63)  BP: 123/72 (27 Sep 2024 05:45) (123/72 - 127/78)  BP(mean): --  RR: 16 (26 Sep 2024 21:30) (16 - 16)  SpO2: 97% (26 Sep 2024 21:30) (97% - 97%)    Parameters below as of 26 Sep 2024 21:30  Patient On (Oxygen Delivery Method): room air      Daily     Daily       MEDICATIONS  (STANDING):  aspirin  chewable 81 milliGRAM(s) Oral daily  atorvastatin 40 milliGRAM(s) Oral at bedtime  enoxaparin Injectable 40 milliGRAM(s) SubCutaneous every 24 hours  losartan 100 milliGRAM(s) Oral daily  pantoprazole    Tablet 40 milliGRAM(s) Oral before breakfast  polyethylene glycol 3350 17 Gram(s) Oral daily  senna 2 Tablet(s) Oral at bedtime    MEDICATIONS  (PRN):  melatonin 6 milliGRAM(s) Oral at bedtime PRN Insomnia      RECENT LABS:                          14.8   7.16  )-----------( 249      ( 26 Sep 2024 06:20 )             45.2     09-26    136  |  101  |  18  ----------------------------<  111[H]  3.7   |  26  |  0.89    Ca    8.9      26 Sep 2024 06:20    TPro  7.4  /  Alb  3.7  /  TBili  0.6  /  DBili  0.1  /  AST  58[H]  /  ALT  125[H]  /  AlkPhos  96  09-27    LIVER FUNCTIONS - ( 27 Sep 2024 05:27 )  Alb: 3.7 g/dL / Pro: 7.4 g/dL / ALK PHOS: 96 U/L / ALT: 125 U/L / AST: 58 U/L / GGT: x             Urinalysis Basic - ( 26 Sep 2024 06:20 )    Color: x / Appearance: x / SG: x / pH: x  Gluc: 111 mg/dL / Ketone: x  / Bili: x / Urobili: x   Blood: x / Protein: x / Nitrite: x   Leuk Esterase: x / RBC: x / WBC x   Sq Epi: x / Non Sq Epi: x / Bacteria: x        CAPILLARY BLOOD GLUCOSE               Patient is a 69y old  Male who presents with a chief complaint of R pontine and L callosal infarct (26 Sep 2024 16:02)    HPI:  68 y/o male RH-dominant, with a PMH of HTN, HLD who presents to [facility] on 9/19/24 with acute onset gait difficulties, left side weakness, slurred speech, intermittent dizziness, and diplopia. LKN is 0700 am 9/18. 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 TTE showed normal LV function. Patient was not a candidate for TNK as he was outside of appropriate window, nor mechanical thrombectomy as there was no LVO. Aspirin and high dose statin was initiated.    Hospital course significant for elevated LFTs, CT C/A/P showed cholelithiasis, patient is recommended to f/u with outpatient GI as he is asymptomatic.     Patient optimized and was evaluated by PM&R and therapy for functional deficits, gait/ADL impairments and acute rehabilitation was recommended. Patient was cleared for discharge to [facility] IRF on 9/23/24. (23 Sep 2024 15:20)      SUBJECTIVE  Interval History:  Patient seen and examined at bedside this morning. He states he is feeling well with no new acute complaints except pruritic scalp. dry patch noted on scalp, he notes he used a special shampoo for issue in the past. No events overnight    Allergies    grass (Sneezing)  No Known Drug Allergies  dust (Sneezing)    Intolerances        OBJECTIVE    69y  Vital Signs Last 24 Hrs  T(C): 36.8 (26 Sep 2024 21:30), Max: 36.8 (26 Sep 2024 21:30)  T(F): 98.2 (26 Sep 2024 21:30), Max: 98.2 (26 Sep 2024 21:30)  HR: 60 (27 Sep 2024 05:45) (60 - 63)  BP: 123/72 (27 Sep 2024 05:45) (123/72 - 127/78)  BP(mean): --  RR: 16 (26 Sep 2024 21:30) (16 - 16)  SpO2: 97% (26 Sep 2024 21:30) (97% - 97%)    Parameters below as of 26 Sep 2024 21:30  Patient On (Oxygen Delivery Method): room air      Daily     Daily       MEDICATIONS  (STANDING):  aspirin  chewable 81 milliGRAM(s) Oral daily  atorvastatin 40 milliGRAM(s) Oral at bedtime  enoxaparin Injectable 40 milliGRAM(s) SubCutaneous every 24 hours  losartan 100 milliGRAM(s) Oral daily  pantoprazole    Tablet 40 milliGRAM(s) Oral before breakfast  polyethylene glycol 3350 17 Gram(s) Oral daily  senna 2 Tablet(s) Oral at bedtime    MEDICATIONS  (PRN):  melatonin 6 milliGRAM(s) Oral at bedtime PRN Insomnia      RECENT LABS:                          14.8   7.16  )-----------( 249      ( 26 Sep 2024 06:20 )             45.2     09-26    136  |  101  |  18  ----------------------------<  111[H]  3.7   |  26  |  0.89    Ca    8.9      26 Sep 2024 06:20    TPro  7.4  /  Alb  3.7  /  TBili  0.6  /  DBili  0.1  /  AST  58[H]  /  ALT  125[H]  /  AlkPhos  96  09-27    LIVER FUNCTIONS - ( 27 Sep 2024 05:27 )  Alb: 3.7 g/dL / Pro: 7.4 g/dL / ALK PHOS: 96 U/L / ALT: 125 U/L / AST: 58 U/L / GGT: x             Urinalysis Basic - ( 26 Sep 2024 06:20 )    Color: x / Appearance: x / SG: x / pH: x  Gluc: 111 mg/dL / Ketone: x  / Bili: x / Urobili: x   Blood: x / Protein: x / Nitrite: x   Leuk Esterase: x / RBC: x / WBC x   Sq Epi: x / Non Sq Epi: x / Bacteria: x        CAPILLARY BLOOD GLUCOSE

## 2024-09-27 NOTE — PROGRESS NOTE ADULT - ASSESSMENT
MICA LE is a 70 y/o male RHD with a PMH of HTN, HLD who presents to [facility] on 24 with acute onset gait difficulties, left side weakness, slurred speech, intermittent dizziness, and diplopia secondary to R pontine and L callosal infarct. Hospital course significant for elevated LFTs/ cholelithiasis (to be managed as outpt).    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  - CTH ()- No hydrocephalus, acute intracranial hemorrhage, mass effect, or brain edema.  - MR Head  ()- : Acute/subacute infarction within the right side of the elba as well as left lateral genu of the corpus callosum with associated cytotoxic edema and without associated hemorrhage.   - TTE ()- normal LV function  - S/P ILR for afib surveillance  - Aspirin 81mg daily  - Atorvastatin 40mg HS- LFTs improving with reduction  - continue comprehensive rehab program, 3 hours a day, 5 days a week. OT PT SLP  - recreation therapy and psychology consult  - Precautions: cardiac, fall, vision, ILR    # HTN  - Losartan 100mg daily  - BP (108/65 - 127/78)     # HLD  - Decreased to Atorvastatin 40mg HS     # cholelithiasis  # Elevated LFTs  - CT C/A/P ()- Cholelithiasis with nonspecific thickening of the gallbladder wall. Correlate with LFTs.  - AST  104[H]  /  ALT  177[H]  /  AlkPhos  102  . -> AST/ALT/ALK 96/58/125  - avoid hepatotoxic meds, decreased atorvastatin and DC tylenol.   - outpatient GI follow up    # Sleep:   - Maintain quiet hours and low stim environment.  - Melatonin 6mg PRN to maximize participation in therapy during the day.     # Pain Management:  - Tylenol stopped    # GI/Bowel:  - Senna QHS, Miralax PRN Daily  - GI ppx: Pantoprazole 40mg    # /Bladder:   - PVR x1 on admission (SC if > 400)  - No concerns    # Skin/Pressure Injury:   - Skin assessment on admission: loop recorder site c/d/i    # Diet  - Regular- DASH    #  DVT ppx:  -  Lovenox 40mg daily    # Case discussed in IDT rounds  (initial):  - regular solid thin liquids, mild deficits quantitaive reasoning, set up eating, supervision oral hygiene, mod assist toileting and toilet transfers, mod assist bathing and dressing, independent bed mobility, CG transfers, ambulates 150 feet with min assist, 8 steps with 1 HR and min assist. Interested in art for rec therapy, retired [employer]  - barriers: LUE weakness, visual perceptual deficits, reduced balance, coordination, dizziness  - goals: ambulate to commode with supervision, perform toileting tasks with set up (patient prefers not to rely on family)  - target: supervision with iADLs, set up toileting and dressing, supervision transfers, supervision ambulation. 10/10 [place] home [facility] caregiver training     # LABS   CBC CMP on     ---------------  Code Status: FULL  Emergency Contact: SON: EDILMA [phone]    PCP Dr Lynn: [phone]    Outpatient Follow-up (Specialty/Name of physician):    Libman, Richard Benjamin  Neurology  [address]  [place] [address]  Phone: [phone]  Fax: [phone]  Follow Up Time: 2 weeks    Edin Neves  Cardiology  [address]  [place] [address]  Phone: [phone]  Fax: [phone]  Follow Up Time: 2 weeks    Felice Hazel  Cardiac Electrophysiology  [address] [place] [address], [id number]  [place] [address]  Phone: [phone]  Fax: [phone]  Follow Up Time: 2 weeks    Felice Herrera  Gastroenterology  [address] [place] [address]  Phone: [phone]  Fax: [phone]  Follow Up Time: 2 weeks MICA LE is a 70 y/o male RHD with a PMH of HTN, HLD who presents to [facility] on 24 with acute onset gait difficulties, left side weakness, slurred speech, intermittent dizziness, and diplopia secondary to R pontine and L callosal infarct. Hospital course significant for elevated LFTs/ cholelithiasis (to be managed as outpt).    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  - CTH ()- No hydrocephalus, acute intracranial hemorrhage, mass effect, or brain edema.  - MR Head  ()- : Acute/subacute infarction within the right side of the elba as well as left lateral genu of the corpus callosum with associated cytotoxic edema and without associated hemorrhage.   - TTE ()- normal LV function  - S/P ILR for afib surveillance  - Aspirin 81mg daily  - Atorvastatin 40mg HS- LFTs improving with reduction  - continue comprehensive rehab program, 3 hours a day, 5 days a week. OT PT SLP  - recreation therapy and psychology consult  - Precautions: cardiac, fall, vision, ILR    # HTN  - Losartan 100mg daily  - BP (108/65 - 127/78)     # HLD  - Decreased to Atorvastatin 40mg HS     # cholelithiasis  # Elevated LFTs  - CT C/A/P ()- Cholelithiasis with nonspecific thickening of the gallbladder wall. Correlate with LFTs.  - AST  104[H]  /  ALT  177[H]  /  AlkPhos  102  . -> AST/ALT/ALK 96/58/125  - avoid hepatotoxic meds, decreased atorvastatin and DC tylenol.   - outpatient GI follow up    # Sleep:   - Maintain quiet hours and low stim environment.  - Melatonin 6mg PRN to maximize participation in therapy during the day.     # Pain Management:  - Tylenol stopped    # GI/Bowel:  - Senna QHS, Miralax PRN Daily  - GI ppx: Pantoprazole 40mg    # /Bladder:   - PVR x1 on admission (SC if > 400)  - No concerns    # Skin/Pressure Injury:   - Skin assessment on admission: loop recorder site c/d/i  - Ketoconazole shampoo ordered    # Diet  - Regular- DASH    #  DVT ppx:  -  Lovenox 40mg daily    # Case discussed in IDT rounds  (initial):  - regular solid thin liquids, mild deficits quantitaive reasoning, set up eating, supervision oral hygiene, mod assist toileting and toilet transfers, mod assist bathing and dressing, independent bed mobility, CG transfers, ambulates 150 feet with min assist, 8 steps with 1 HR and min assist. Interested in art for rec therapy, retired [employer]  - barriers: LUE weakness, visual perceptual deficits, reduced balance, coordination, dizziness  - goals: ambulate to commode with supervision, perform toileting tasks with set up (patient prefers not to rely on family)  - target: supervision with iADLs, set up toileting and dressing, supervision transfers, supervision ambulation. 10/10 [place] home [facility] caregiver training     # LABS   CBC CMP on     ---------------  Code Status: FULL  Emergency Contact: SON: EDILMA [phone]    PCP Dr Lynn: [phone]    Outpatient Follow-up (Specialty/Name of physician):    Libman, Richard Benjamin  Neurology  [address]  [place] [address]  Phone: [phone]  Fax: [phone]  Follow Up Time: 2 weeks    Edin Neves  Cardiology  [address], [address]  [place] [address]  Phone: [phone]  Fax: [phone]  Follow Up Time: 2 weeks    Felice Hazel  Cardiac Electrophysiology  [address] [place] [address], [address]  [place] [address]  Phone: [phone]  Fax: [phone]  Follow Up Time: 2 weeks    Felice Herrera  Gastroenterology  [address] [place] [address]  Phone: [phone]  Fax: [phone]  Follow Up Time: 2 weeks MICA LE is a 70 y/o male RHD with a PMH of HTN, HLD who presents to [facility] on 24 with acute onset gait difficulties, left side weakness, slurred speech, intermittent dizziness, and diplopia secondary to R pontine and L callosal infarct. Hospital course significant for elevated LFTs/ cholelithiasis (to be managed as outpt).    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  - CTH ()- No hydrocephalus, acute intracranial hemorrhage, mass effect, or brain edema.  - MR Head  ()- : Acute/subacute infarction within the right side of the elba as well as left lateral genu of the corpus callosum with associated cytotoxic edema and without associated hemorrhage.   - TTE ()- normal LV function  - S/P ILR for afib surveillance  - Aspirin 81mg daily  - Atorvastatin 40mg HS- LFTs improving with reduction, reviewed with patient and wife  - continue comprehensive rehab program, 3 hours a day, 5 days a week. OT PT SLP  - recreation therapy and psychology consult  - Precautions: cardiac, fall, vision, ILR    # HTN  - Losartan 100mg daily  - BP (108/65 - 127/78)     # HLD  - Decreased to Atorvastatin 40mg HS     # cholelithiasis  # transaminitis  - CT C/A/P ()- Cholelithiasis with nonspecific thickening of the gallbladder wall. Correlate with LFTs.  - AST  104[H]  /  ALT  177[H]  /  AlkPhos  102  . -> AST/ALT/ALK 96/58/125  - avoid hepatotoxic meds, decreased atorvastatin and DC tylenol.   - outpatient GI follow up    # Sleep:   - Maintain quiet hours and low stim environment.  - Melatonin 6mg PRN to maximize participation in therapy during the day.     # Pain Management:  - Tylenol stopped    # GI/Bowel:  - Senna QHS, Miralax PRN Daily  - GI ppx: Pantoprazole 40mg    # /Bladder:   - PVR x1 on admission (SC if > 400)  - No concerns    # Skin/Pressure Injury:   - Skin assessment on admission: loop recorder site c/d/i  - Itching scalp, resistant dandruff. Ketoconazole shampoo ordered, discussed with patient and wife    # Diet  - Regular- DASH    #  DVT ppx:  -  Lovenox 40mg daily    # Case discussed in IDT rounds  (initial):  - regular solid thin liquids, mild deficits quantitaive reasoning, set up eating, supervision oral hygiene, mod assist toileting and toilet transfers, mod assist bathing and dressing, independent bed mobility, CG transfers, ambulates 150 feet with min assist, 8 steps with 1 HR and min assist. Interested in art for rec therapy, retired [employer]  - barriers: LUE weakness, visual perceptual deficits, reduced balance, coordination, dizziness  - goals: ambulate to commode with supervision, perform toileting tasks with set up (patient prefers not to rely on family)  - target: supervision with iADLs, set up toileting and dressing, supervision transfers, supervision ambulation. 10/10 [place] home [facility] caregiver training     # LABS   CBC CMP on     ---------------  Code Status: FULL  Emergency Contact: SON: EDILMA [phone]    PCP Dr Lynn: [phone]    Outpatient Follow-up (Specialty/Name of physician):    Libman, Richard Benjamin  Neurology  [address]  [place] [address]  Phone: [phone]  Fax: [phone]  Follow Up Time: 2 weeks    Edin Neves  Cardiology  [address]  [place] [address]  Phone: [phone]  Fax: [phone]  Follow Up Time: 2 weeks    Felice Hazel  Cardiac Electrophysiology  [address] [place] [address], [address]  [place] [address]  Phone: [phone]  Fax: [phone]  Follow Up Time: 2 weeks    Felice Herrera  Gastroenterology  [address] [place] [address]  Phone: [phone]  Fax: [phone]  Follow Up Time: 2 weeks

## 2024-09-27 NOTE — PROGRESS NOTE ADULT - RESPIRATORY
normal/clear to auscultation bilaterally/no wheezes/no rales/no rhonchi no respiratory distress/no use of accessory muscles

## 2024-09-27 NOTE — PROGRESS NOTE ADULT - GASTROINTESTINAL
normal/soft/nontender/nondistended/normal active bowel sounds negative normal/soft/nontender/nondistended

## 2024-09-28 PROCEDURE — 99232 SBSQ HOSP IP/OBS MODERATE 35: CPT

## 2024-09-28 RX ADMIN — ATORVASTATIN CALCIUM 40 MILLIGRAM(S): 10 TABLET, FILM COATED ORAL at 21:00

## 2024-09-28 RX ADMIN — PANTOPRAZOLE SODIUM 40 MILLIGRAM(S): 40 TABLET, DELAYED RELEASE ORAL at 05:35

## 2024-09-28 RX ADMIN — Medication 81 MILLIGRAM(S): at 13:09

## 2024-09-28 RX ADMIN — Medication 2 TABLET(S): at 21:00

## 2024-09-28 RX ADMIN — ENOXAPARIN SODIUM 40 MILLIGRAM(S): 150 INJECTION SUBCUTANEOUS at 13:10

## 2024-09-28 RX ADMIN — LOSARTAN POTASSIUM 100 MILLIGRAM(S): 100 TABLET, FILM COATED ORAL at 05:36

## 2024-09-28 RX ADMIN — Medication 17 GRAM(S): at 13:09

## 2024-09-28 NOTE — PROGRESS NOTE ADULT - SUBJECTIVE AND OBJECTIVE BOX
Patient is a 69y old  Male who presents with a chief complaint of R pontine and L callosal infarct (28 Sep 2024 11:31)    Last BM last night  Did not sleep well last night; was on his phone all night    Patient seen and examined at bedside.    ALLERGIES:  grass (Sneezing)  No Known Drug Allergies  dust (Sneezing)    MEDICATIONS  (STANDING):  aspirin  chewable 81 milliGRAM(s) Oral daily  atorvastatin 40 milliGRAM(s) Oral at bedtime  enoxaparin Injectable 40 milliGRAM(s) SubCutaneous every 24 hours  ketoconazole 2% Shampoo 1 Application(s) Topical every 24 hours  losartan 100 milliGRAM(s) Oral daily  pantoprazole    Tablet 40 milliGRAM(s) Oral before breakfast  polyethylene glycol 3350 17 Gram(s) Oral daily  senna 2 Tablet(s) Oral at bedtime    MEDICATIONS  (PRN):  melatonin 6 milliGRAM(s) Oral at bedtime PRN Insomnia    Vital Signs Last 24 Hrs  T(F): 98.2 (28 Sep 2024 09:35), Max: 98.4 (27 Sep 2024 19:40)  HR: 60 (28 Sep 2024 09:35) (58 - 60)  BP: 123/79 (28 Sep 2024 09:35) (115/70 - 123/79)  RR: 16 (28 Sep 2024 09:35) (16 - 16)  SpO2: 97% (28 Sep 2024 09:35) (94% - 97%)  I&O's Summary    BMI (kg/m2): 22.1 (09-23-24 @ 19:21)  PHYSICAL EXAM:  General: NAD, A/O x 3  ENT: MMM, no scleral icterus  Neck: Supple, No JVD, no thyroidomegaly  Lungs: Clear to auscultation bilaterally, no wheezes, no rales, no rhonchi, good inspiratory effort  Cardio: RRR, S1/S2, No murmurs  Abdomen: Soft, Nontender, Nondistended; Bowel sounds present  Extremities: No calf tenderness, No pitting edema, no skin changes    LABS:                        14.8   7.16  )-----------( 249      ( 26 Sep 2024 06:20 )             45.2       09-26    136  |  101  |  18  ----------------------------<  111  3.7   |  26  |  0.89    Ca    8.9      26 Sep 2024 06:20    TPro  7.4  /  Alb  3.7  /  TBili  0.6  /  DBili  0.1  /  AST  58  /  ALT  125  /  AlkPhos  96  09-27 09-20 Chol 175 mg/dL LDL -- HDL 33 mg/dL Trig 240 mg/dL    Urinalysis Basic - ( 26 Sep 2024 06:20 )    Color: x / Appearance: x / SG: x / pH: x  Gluc: 111 mg/dL / Ketone: x  / Bili: x / Urobili: x   Blood: x / Protein: x / Nitrite: x   Leuk Esterase: x / RBC: x / WBC x   Sq Epi: x / Non Sq Epi: x / Bacteria: x    COVID-19 PCR: NotDetec (09-23-24 @ 19:52)  COVID-19 PCR: NotDetec (09-23-24 @ 19:52)

## 2024-09-28 NOTE — PROGRESS NOTE ADULT - SUBJECTIVE AND OBJECTIVE BOX
Pt. seen and examined at bedside.  No overnight events.      REVIEW OF SYSTEMS  Constitutional - No fever,  No fatigue  Neurological - No headaches, + loss of strength left arm  Musculoskeletal - No joint pain, No joint swelling, No muscle pain    VITALS  T(C): 36.8 (09-28-24 @ 09:35), Max: 36.9 (09-27-24 @ 19:40)  HR: 60 (09-28-24 @ 09:35) (58 - 60)  BP: 123/79 (09-28-24 @ 09:35) (115/70 - 123/79)  RR: 16 (09-28-24 @ 09:35) (16 - 16)  SpO2: 97% (09-28-24 @ 09:35) (94% - 97%)  Wt(kg): --       MEDICATIONS   aspirin  chewable 81 milliGRAM(s) daily  atorvastatin 40 milliGRAM(s) at bedtime  enoxaparin Injectable 40 milliGRAM(s) every 24 hours  ketoconazole 2% Shampoo 1 Application(s) every 24 hours  losartan 100 milliGRAM(s) daily  melatonin 6 milliGRAM(s) at bedtime PRN  pantoprazole    Tablet 40 milliGRAM(s) before breakfast  polyethylene glycol 3350 17 Gram(s) daily  senna 2 Tablet(s) at bedtime      RECENT LABS/IMAGING        TPro  7.4  /  Alb  3.7  /  TBili  0.6  /  DBili  0.1  /  AST  58[H]  /  ALT  125[H]  /  AlkPhos  96  09-27                  ---------  PHYSICAL EXAM  Constitutional - NAD, Comfortable  Pulm - Breathing comfortably, No wheezing  Abd - Soft, NTND  Extremities - No edema, No calf tenderness  Neurologic Exam -                    Cognitive - Awake, Alert     Communication - Fluent     Motor - LEFT ARM WEAKNESS     Sensory - Intact to LT  Psychiatric - Mood WNL, Affect WNL    ASSESSMENT/PLAN  69y Male with functional deficits after R pontine and L callosal infarct.  Continue current medical management  Pain - Tylenol PRN  DVT PPX - enoxaparin Injectable 40 milliGRAM(s) every 24 hours  Active issues - none  Continue 3hrs a day of comprehensive rehab program.

## 2024-09-28 NOTE — PROGRESS NOTE ADULT - ASSESSMENT
68 y/o male RHD with a PMH of HTN, HLD who presents to [facility] on 9/19/24 with acute onset gait difficulties, left side weakness, slurred speech, intermittent dizziness, and diplopia secondary to R pontine and L callosal infarct. Hospital course significant for elevated LFTs/ cholelithiasis (to be managed as outpt).    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  - TTE (9/20)- normal LV function  - S/P ILR for afib surveillance  - Aspirin 81mg daily  - Atorvastatin dose will be decreased to 40mg HS due to increasing LFT's    # Transaminitis  - with h/o cholelithiasis. Asymptomatic  - decrease the dose of Lipitor to 40 mg hs and monitor    # HTN  - Losartan 100mg daily    # HLD  - Atorvastatin 40mg HS    # cholelithiasis  # Elevated LFTs  - CT C/A/P (9/23)- Cholelithiasis with nonspecific thickening of the gallbladder wall. Correlate with LFTs.  - avoid hepatotoxic meds  -elevated LFT'a but clinically pt is asymptomatic. No c/o abd pain, N/V  - outpatient GI follow up    DVT ppx- Lovenox

## 2024-09-29 PROCEDURE — 99232 SBSQ HOSP IP/OBS MODERATE 35: CPT

## 2024-09-29 RX ORDER — BACITRACIN 500 [USP'U]/G
1 OINTMENT TOPICAL DAILY
Refills: 0 | Status: DISCONTINUED | OUTPATIENT
Start: 2024-09-29 | End: 2024-10-10

## 2024-09-29 RX ORDER — ACETAMINOPHEN 325 MG
650 TABLET ORAL EVERY 6 HOURS
Refills: 0 | Status: DISCONTINUED | OUTPATIENT
Start: 2024-09-29 | End: 2024-10-10

## 2024-09-29 RX ADMIN — ATORVASTATIN CALCIUM 40 MILLIGRAM(S): 10 TABLET, FILM COATED ORAL at 21:46

## 2024-09-29 RX ADMIN — Medication 81 MILLIGRAM(S): at 11:34

## 2024-09-29 RX ADMIN — LOSARTAN POTASSIUM 100 MILLIGRAM(S): 100 TABLET, FILM COATED ORAL at 05:30

## 2024-09-29 RX ADMIN — PANTOPRAZOLE SODIUM 40 MILLIGRAM(S): 40 TABLET, DELAYED RELEASE ORAL at 05:30

## 2024-09-29 RX ADMIN — Medication 2 TABLET(S): at 21:46

## 2024-09-29 RX ADMIN — ENOXAPARIN SODIUM 40 MILLIGRAM(S): 150 INJECTION SUBCUTANEOUS at 11:34

## 2024-09-29 RX ADMIN — BACITRACIN 1 APPLICATION(S): 500 OINTMENT TOPICAL at 15:24

## 2024-09-29 RX ADMIN — Medication 1 APPLICATION(S): at 18:48

## 2024-09-29 NOTE — PROGRESS NOTE ADULT - SUBJECTIVE AND OBJECTIVE BOX
Patient is a 69y old  Male who presents with a chief complaint of R pontine and L callosal infarct (29 Sep 2024 09:44)      Patient seen and examined at bedside.    ALLERGIES:  grass (Sneezing)  No Known Drug Allergies  dust (Sneezing)    MEDICATIONS  (STANDING):  aspirin  chewable 81 milliGRAM(s) Oral daily  atorvastatin 40 milliGRAM(s) Oral at bedtime  enoxaparin Injectable 40 milliGRAM(s) SubCutaneous every 24 hours  ketoconazole 2% Shampoo 1 Application(s) Topical every 24 hours  losartan 100 milliGRAM(s) Oral daily  pantoprazole    Tablet 40 milliGRAM(s) Oral before breakfast  polyethylene glycol 3350 17 Gram(s) Oral daily  senna 2 Tablet(s) Oral at bedtime    MEDICATIONS  (PRN):  melatonin 6 milliGRAM(s) Oral at bedtime PRN Insomnia    Vital Signs Last 24 Hrs  T(F): 97.6 (29 Sep 2024 08:18), Max: 98 (28 Sep 2024 19:32)  HR: 61 (29 Sep 2024 08:18) (61 - 66)  BP: 142/89 (29 Sep 2024 08:18) (120/70 - 142/89)  RR: 14 (29 Sep 2024 08:18) (14 - 16)  SpO2: 97% (29 Sep 2024 08:18) (97% - 98%)  I&O's Summary      PHYSICAL EXAM:  General: NAD, A/O   ENT: MMM, no scleral icterus  Neck: Supple, No JVD, no thyroidomegaly  Lungs: Clear to auscultation bilaterally, no wheezes, no rales, no rhonchi, good inspiratory effort  Cardio: RRR, S1/S2, No murmurs  Abdomen: Soft, Nontender, Nondistended; Bowel sounds present  Extremities: No calf tenderness, No pitting edema, no skin changes    LABS:      Pro  7.4  /  Alb  3.7  /  TBili  0.6  /  DBili  0.1  /  AST  58  /  ALT  125  /  AlkPhos  96  09-27     9-20 Chol 175 mg/dL LDL -- HDL 33 mg/dL Trig 240 mg/dL      COVID-19 PCR: NotDetec (09-23-24 @ 19:52)  COVID-19 PCR: NotDetec (09-23-24 @ 19:52)

## 2024-09-29 NOTE — PROGRESS NOTE ADULT - SUBJECTIVE AND OBJECTIVE BOX
Pt. seen and examined at bedside.  No overnight events.      REVIEW OF SYSTEMS  Constitutional - No fever,  No fatigue  Neurological - No headaches, ++ loss of strength  Musculoskeletal - No joint pain, No joint swelling, No muscle pain    VITALS  T(C): 36.4 (09-29-24 @ 08:18), Max: 36.7 (09-28-24 @ 19:32)  HR: 61 (09-29-24 @ 08:18) (61 - 66)  BP: 142/89 (09-29-24 @ 08:18) (120/70 - 142/89)  RR: 14 (09-29-24 @ 08:18) (14 - 16)  SpO2: 97% (09-29-24 @ 08:18) (97% - 98%)  Wt(kg): --       MEDICATIONS   aspirin  chewable 81 milliGRAM(s) daily  atorvastatin 40 milliGRAM(s) at bedtime  enoxaparin Injectable 40 milliGRAM(s) every 24 hours  ketoconazole 2% Shampoo 1 Application(s) every 24 hours  losartan 100 milliGRAM(s) daily  melatonin 6 milliGRAM(s) at bedtime PRN  pantoprazole    Tablet 40 milliGRAM(s) before breakfast  polyethylene glycol 3350 17 Gram(s) daily  senna 2 Tablet(s) at bedtime      RECENT LABS/IMAGING                        ---------  PHYSICAL EXAM  Constitutional - NAD, Comfortable  Pulm - Breathing comfortably, No wheezing  Abd - Soft, NTND  Extremities - No edema, No calf tenderness  Neurologic Exam -                    Cognitive - Awake, Alert     Communication - Fluent     Motor - LEFT ARM WEAKNESS      Sensory - Intact to LT  Psychiatric - Mood WNL, Affect WNL    ASSESSMENT/PLAN  69y Male with functional deficits after R pontine and L callosal infarct  Continue current medical management  Pain - Tylenol PRN  DVT PPX - enoxaparin Injectable 40 milliGRAM(s) every 24 hours  Active issues - none  Continue 3hrs a day of comprehensive rehab program.

## 2024-09-30 ENCOUNTER — NON-APPOINTMENT (OUTPATIENT)
Age: 69
End: 2024-09-30

## 2024-09-30 LAB
ALBUMIN SERPL ELPH-MCNC: 3.7 G/DL — SIGNIFICANT CHANGE UP (ref 3.3–5)
ALP SERPL-CCNC: 93 U/L — SIGNIFICANT CHANGE UP (ref 40–120)
ALT FLD-CCNC: 100 U/L — HIGH (ref 10–45)
ANION GAP SERPL CALC-SCNC: 8 MMOL/L — SIGNIFICANT CHANGE UP (ref 5–17)
AST SERPL-CCNC: 40 U/L — SIGNIFICANT CHANGE UP (ref 10–40)
BASOPHILS # BLD AUTO: 0.04 K/UL — SIGNIFICANT CHANGE UP (ref 0–0.2)
BASOPHILS NFR BLD AUTO: 0.6 % — SIGNIFICANT CHANGE UP (ref 0–2)
BILIRUB SERPL-MCNC: 0.7 MG/DL — SIGNIFICANT CHANGE UP (ref 0.2–1.2)
BUN SERPL-MCNC: 15 MG/DL — SIGNIFICANT CHANGE UP (ref 7–23)
CALCIUM SERPL-MCNC: 9.2 MG/DL — SIGNIFICANT CHANGE UP (ref 8.4–10.5)
CHLORIDE SERPL-SCNC: 105 MMOL/L — SIGNIFICANT CHANGE UP (ref 96–108)
CO2 SERPL-SCNC: 29 MMOL/L — SIGNIFICANT CHANGE UP (ref 22–31)
CREAT SERPL-MCNC: 1 MG/DL — SIGNIFICANT CHANGE UP (ref 0.5–1.3)
EGFR: 81 ML/MIN/1.73M2 — SIGNIFICANT CHANGE UP
EOSINOPHIL # BLD AUTO: 0.06 K/UL — SIGNIFICANT CHANGE UP (ref 0–0.5)
EOSINOPHIL NFR BLD AUTO: 1 % — SIGNIFICANT CHANGE UP (ref 0–6)
GLUCOSE SERPL-MCNC: 111 MG/DL — HIGH (ref 70–99)
HCT VFR BLD CALC: 44.1 % — SIGNIFICANT CHANGE UP (ref 39–50)
HGB BLD-MCNC: 14.5 G/DL — SIGNIFICANT CHANGE UP (ref 13–17)
IMM GRANULOCYTES NFR BLD AUTO: 0.6 % — SIGNIFICANT CHANGE UP (ref 0–0.9)
LYMPHOCYTES # BLD AUTO: 1.15 K/UL — SIGNIFICANT CHANGE UP (ref 1–3.3)
LYMPHOCYTES # BLD AUTO: 18.5 % — SIGNIFICANT CHANGE UP (ref 13–44)
MCHC RBC-ENTMCNC: 28.4 PG — SIGNIFICANT CHANGE UP (ref 27–34)
MCHC RBC-ENTMCNC: 32.9 GM/DL — SIGNIFICANT CHANGE UP (ref 32–36)
MCV RBC AUTO: 86.5 FL — SIGNIFICANT CHANGE UP (ref 80–100)
MONOCYTES # BLD AUTO: 0.52 K/UL — SIGNIFICANT CHANGE UP (ref 0–0.9)
MONOCYTES NFR BLD AUTO: 8.4 % — SIGNIFICANT CHANGE UP (ref 2–14)
NEUTROPHILS # BLD AUTO: 4.4 K/UL — SIGNIFICANT CHANGE UP (ref 1.8–7.4)
NEUTROPHILS NFR BLD AUTO: 70.9 % — SIGNIFICANT CHANGE UP (ref 43–77)
NRBC # BLD: 0 /100 WBCS — SIGNIFICANT CHANGE UP (ref 0–0)
PLATELET # BLD AUTO: 264 K/UL — SIGNIFICANT CHANGE UP (ref 150–400)
POTASSIUM SERPL-MCNC: 4.5 MMOL/L — SIGNIFICANT CHANGE UP (ref 3.5–5.3)
POTASSIUM SERPL-SCNC: 4.5 MMOL/L — SIGNIFICANT CHANGE UP (ref 3.5–5.3)
PROT SERPL-MCNC: 7.2 G/DL — SIGNIFICANT CHANGE UP (ref 6–8.3)
RBC # BLD: 5.1 M/UL — SIGNIFICANT CHANGE UP (ref 4.2–5.8)
RBC # FLD: 13.2 % — SIGNIFICANT CHANGE UP (ref 10.3–14.5)
SODIUM SERPL-SCNC: 142 MMOL/L — SIGNIFICANT CHANGE UP (ref 135–145)
WBC # BLD: 6.21 K/UL — SIGNIFICANT CHANGE UP (ref 3.8–10.5)
WBC # FLD AUTO: 6.21 K/UL — SIGNIFICANT CHANGE UP (ref 3.8–10.5)

## 2024-09-30 PROCEDURE — 99232 SBSQ HOSP IP/OBS MODERATE 35: CPT

## 2024-09-30 RX ADMIN — ENOXAPARIN SODIUM 40 MILLIGRAM(S): 150 INJECTION SUBCUTANEOUS at 11:16

## 2024-09-30 RX ADMIN — Medication 17 GRAM(S): at 11:16

## 2024-09-30 RX ADMIN — Medication 1 APPLICATION(S): at 16:02

## 2024-09-30 RX ADMIN — Medication 2 TABLET(S): at 21:11

## 2024-09-30 RX ADMIN — BACITRACIN 1 APPLICATION(S): 500 OINTMENT TOPICAL at 11:17

## 2024-09-30 RX ADMIN — ATORVASTATIN CALCIUM 40 MILLIGRAM(S): 10 TABLET, FILM COATED ORAL at 21:11

## 2024-09-30 RX ADMIN — Medication 81 MILLIGRAM(S): at 11:17

## 2024-09-30 RX ADMIN — LOSARTAN POTASSIUM 100 MILLIGRAM(S): 100 TABLET, FILM COATED ORAL at 06:24

## 2024-09-30 RX ADMIN — PANTOPRAZOLE SODIUM 40 MILLIGRAM(S): 40 TABLET, DELAYED RELEASE ORAL at 06:23

## 2024-09-30 NOTE — PROGRESS NOTE ADULT - ASSESSMENT
70 y/o male RHD with a PMH of HTN, HLD who presents to [facility] on 9/19/24 with acute onset gait difficulties, left side weakness, slurred speech, intermittent dizziness, and diplopia secondary to R pontine and L callosal infarct. Hospital course significant for elevated LFTs/ cholelithiasis (to be managed as outpt).    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  - TTE (9/20)- normal LV function  - S/P ILR for afib surveillance  - Aspirin 81mg daily  - Atorvastatin dose will be decreased to 40mg HS due to increasing LFT's    # Transaminitis  - with h/o cholelithiasis. Asymptomatic  - decrease the dose of Lipitor to 40 mg hs and monitor    # HTN  - Losartan 100mg daily    # HLD  - Atorvastatin 40mg HS    # cholelithiasis  # Elevated LFTs  - CT C/A/P (9/23)- Cholelithiasis with nonspecific thickening of the gallbladder wall. Correlate with LFTs.  - avoid hepatotoxic meds  -elevated LFT'a but clinically pt is asymptomatic. No c/o abd pain, N/V  - outpatient GI follow up    DVT ppx- Lovenox

## 2024-09-30 NOTE — PROGRESS NOTE ADULT - SUBJECTIVE AND OBJECTIVE BOX
Patient is a 69y old  Male who presents with a chief complaint of R pontine and L callosal infarct (29 Sep 2024 10:05)    HPI:  70 y/o male RH-dominant, with a PMH of HTN, HLD who presents to [facility] on 9/19/24 with acute onset gait difficulties, left side weakness, slurred speech, intermittent dizziness, and diplopia. LKN is 0700 am 9/18. 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 TTE showed normal LV function. Patient was not a candidate for TNK as he was outside of appropriate window, nor mechanical thrombectomy as there was no LVO. Aspirin and high dose statin was initiated.    Hospital course significant for elevated LFTs, CT C/A/P showed cholelithiasis, patient is recommended to f/u with outpatient GI as he is asymptomatic.     Patient optimized and was evaluated by PM&R and therapy for functional deficits, gait/ADL impairments and acute rehabilitation was recommended. Patient was cleared for discharge to [facility] IRF on 9/23/24. (23 Sep 2024 15:20)      SUBJECTIVE  Interval History:      Allergies    grass (Sneezing)  No Known Drug Allergies  dust (Sneezing)    Intolerances        OBJECTIVE    69y  Vital Signs Last 24 Hrs  T(C): 36.7 (30 Sep 2024 07:50), Max: 36.7 (29 Sep 2024 16:34)  T(F): 98.1 (30 Sep 2024 07:50), Max: 98.1 (29 Sep 2024 16:34)  HR: 62 (30 Sep 2024 07:50) (62 - 79)  BP: 115/72 (30 Sep 2024 07:50) (115/72 - 127/74)  BP(mean): --  RR: 16 (30 Sep 2024 07:50) (14 - 16)  SpO2: 96% (30 Sep 2024 07:50) (96% - 98%)    Parameters below as of 30 Sep 2024 07:50  Patient On (Oxygen Delivery Method): room air      Daily     Daily       MEDICATIONS  (STANDING):  aspirin  chewable 81 milliGRAM(s) Oral daily  atorvastatin 40 milliGRAM(s) Oral at bedtime  bacitracin   Ointment 1 Application(s) Topical daily  enoxaparin Injectable 40 milliGRAM(s) SubCutaneous every 24 hours  losartan 100 milliGRAM(s) Oral daily  pantoprazole    Tablet 40 milliGRAM(s) Oral before breakfast  polyethylene glycol 3350 17 Gram(s) Oral daily  senna 2 Tablet(s) Oral at bedtime    MEDICATIONS  (PRN):  acetaminophen     Tablet .. 650 milliGRAM(s) Oral every 6 hours PRN Mild Pain (1 - 3)  melatonin 6 milliGRAM(s) Oral at bedtime PRN Insomnia      RECENT LABS:                          14.5   6.21  )-----------( 264      ( 30 Sep 2024 06:05 )             44.1     09-30    142  |  105  |  15  ----------------------------<  111[H]  4.5   |  29  |  1.00    Ca    9.2      30 Sep 2024 06:05    TPro  7.2  /  Alb  3.7  /  TBili  0.7  /  DBili  x   /  AST  40  /  ALT  100[H]  /  AlkPhos  93  09-30    LIVER FUNCTIONS - ( 30 Sep 2024 06:05 )  Alb: 3.7 g/dL / Pro: 7.2 g/dL / ALK PHOS: 93 U/L / ALT: 100 U/L / AST: 40 U/L / GGT: x             Urinalysis Basic - ( 30 Sep 2024 06:05 )    Color: x / Appearance: x / SG: x / pH: x  Gluc: 111 mg/dL / Ketone: x  / Bili: x / Urobili: x   Blood: x / Protein: x / Nitrite: x   Leuk Esterase: x / RBC: x / WBC x   Sq Epi: x / Non Sq Epi: x / Bacteria: x        CAPILLARY BLOOD GLUCOSE               Patient is a 69y old  Male who presents with a chief complaint of R pontine and L callosal infarct (29 Sep 2024 10:05)    HPI:  68 y/o male RH-dominant, with a PMH of HTN, HLD who presents to [facility] on 9/19/24 with acute onset gait difficulties, left side weakness, slurred speech, intermittent dizziness, and diplopia. LKN is 0700 am 9/18. 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 TTE showed normal LV function. Patient was not a candidate for TNK as he was outside of appropriate window, nor mechanical thrombectomy as there was no LVO. Aspirin and high dose statin was initiated.    Hospital course significant for elevated LFTs, CT C/A/P showed cholelithiasis, patient is recommended to f/u with outpatient GI as he is asymptomatic.     Patient optimized and was evaluated by PM&R and therapy for functional deficits, gait/ADL impairments and acute rehabilitation was recommended. Patient was cleared for discharge to [facility] IRF on 9/23/24. (23 Sep 2024 15:20)      SUBJECTIVE  Interval History:  Patient seen and examined in gym this morning. No new complaints. No acute events overnight.     Allergies    grass (Sneezing)  No Known Drug Allergies  dust (Sneezing)    Intolerances        OBJECTIVE    69y  Vital Signs Last 24 Hrs  T(C): 36.7 (30 Sep 2024 07:50), Max: 36.7 (29 Sep 2024 16:34)  T(F): 98.1 (30 Sep 2024 07:50), Max: 98.1 (29 Sep 2024 16:34)  HR: 62 (30 Sep 2024 07:50) (62 - 79)  BP: 115/72 (30 Sep 2024 07:50) (115/72 - 127/74)  BP(mean): --  RR: 16 (30 Sep 2024 07:50) (14 - 16)  SpO2: 96% (30 Sep 2024 07:50) (96% - 98%)    Parameters below as of 30 Sep 2024 07:50  Patient On (Oxygen Delivery Method): room air      Daily     Daily       MEDICATIONS  (STANDING):  aspirin  chewable 81 milliGRAM(s) Oral daily  atorvastatin 40 milliGRAM(s) Oral at bedtime  bacitracin   Ointment 1 Application(s) Topical daily  enoxaparin Injectable 40 milliGRAM(s) SubCutaneous every 24 hours  losartan 100 milliGRAM(s) Oral daily  pantoprazole    Tablet 40 milliGRAM(s) Oral before breakfast  polyethylene glycol 3350 17 Gram(s) Oral daily  senna 2 Tablet(s) Oral at bedtime    MEDICATIONS  (PRN):  acetaminophen     Tablet .. 650 milliGRAM(s) Oral every 6 hours PRN Mild Pain (1 - 3)  melatonin 6 milliGRAM(s) Oral at bedtime PRN Insomnia      RECENT LABS:                          14.5   6.21  )-----------( 264      ( 30 Sep 2024 06:05 )             44.1     09-30    142  |  105  |  15  ----------------------------<  111[H]  4.5   |  29  |  1.00    Ca    9.2      30 Sep 2024 06:05    TPro  7.2  /  Alb  3.7  /  TBili  0.7  /  DBili  x   /  AST  40  /  ALT  100[H]  /  AlkPhos  93  09-30    LIVER FUNCTIONS - ( 30 Sep 2024 06:05 )  Alb: 3.7 g/dL / Pro: 7.2 g/dL / ALK PHOS: 93 U/L / ALT: 100 U/L / AST: 40 U/L / GGT: x             Urinalysis Basic - ( 30 Sep 2024 06:05 )    Color: x / Appearance: x / SG: x / pH: x  Gluc: 111 mg/dL / Ketone: x  / Bili: x / Urobili: x   Blood: x / Protein: x / Nitrite: x   Leuk Esterase: x / RBC: x / WBC x   Sq Epi: x / Non Sq Epi: x / Bacteria: x        CAPILLARY BLOOD GLUCOSE               Patient is a 69y old  Male who presents with a chief complaint of R pontine and L callosal infarct (29 Sep 2024 10:05)    HPI:  70 y/o male RH-dominant, with a PMH of HTN, HLD who presents to [facility] on 9/19/24 with acute onset gait difficulties, left side weakness, slurred speech, intermittent dizziness, and diplopia. LKN is 0700 am 9/18. 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 TTE showed normal LV function. Patient was not a candidate for TNK as he was outside of appropriate window, nor mechanical thrombectomy as there was no LVO. Aspirin and high dose statin was initiated.    Hospital course significant for elevated LFTs, CT C/A/P showed cholelithiasis, patient is recommended to f/u with outpatient GI as he is asymptomatic.     Patient optimized and was evaluated by PM&R and therapy for functional deficits, gait/ADL impairments and acute rehabilitation was recommended. Patient was cleared for discharge to [facility] IRF on 9/23/24. (23 Sep 2024 15:20)      SUBJECTIVE  Interval History:  Patient seen and examined in gym this morning. No new complaints. No acute events overnight.     Allergies    grass (Sneezing)  No Known Drug Allergies  dust (Sneezing)    Intolerances        OBJECTIVE    69y  Vital Signs Last 24 Hrs  T(C): 36.7 (30 Sep 2024 07:50), Max: 36.7 (29 Sep 2024 16:34)  T(F): 98.1 (30 Sep 2024 07:50), Max: 98.1 (29 Sep 2024 16:34)  HR: 62 (30 Sep 2024 07:50) (62 - 79)  BP: 115/72 (30 Sep 2024 07:50) (115/72 - 127/74)  BP(mean): --  RR: 16 (30 Sep 2024 07:50) (14 - 16)  SpO2: 96% (30 Sep 2024 07:50) (96% - 98%)    Parameters below as of 30 Sep 2024 07:50  Patient On (Oxygen Delivery Method): room air      Daily     Daily       MEDICATIONS  (STANDING):  aspirin  chewable 81 milliGRAM(s) Oral daily  atorvastatin 40 milliGRAM(s) Oral at bedtime  bacitracin   Ointment 1 Application(s) Topical daily  enoxaparin Injectable 40 milliGRAM(s) SubCutaneous every 24 hours  losartan 100 milliGRAM(s) Oral daily  pantoprazole    Tablet 40 milliGRAM(s) Oral before breakfast  polyethylene glycol 3350 17 Gram(s) Oral daily  senna 2 Tablet(s) Oral at bedtime    MEDICATIONS  (PRN):  acetaminophen     Tablet .. 650 milliGRAM(s) Oral every 6 hours PRN Mild Pain (1 - 3)  melatonin 6 milliGRAM(s) Oral at bedtime PRN Insomnia      RECENT LABS:                          14.5   6.21  )-----------( 264      ( 30 Sep 2024 06:05 )             44.1     09-30    142  |  105  |  15  ----------------------------<  111[H]  4.5   |  29  |  1.00    Ca    9.2      30 Sep 2024 06:05    TPro  7.2  /  Alb  3.7  /  TBili  0.7  /  DBili  x   /  AST  40  /  ALT  100[H]  /  AlkPhos  93  09-30    LIVER FUNCTIONS - ( 30 Sep 2024 06:05 )  Alb: 3.7 g/dL / Pro: 7.2 g/dL / ALK PHOS: 93 U/L / ALT: 100 U/L / AST: 40 U/L / GGT: x             Urinalysis Basic - ( 30 Sep 2024 06:05 )    Color: x / Appearance: x / SG: x / pH: x  Gluc: 111 mg/dL / Ketone: x  / Bili: x / Urobili: x   Blood: x / Protein: x / Nitrite: x   Leuk Esterase: x / RBC: x / WBC x   Sq Epi: x / Non Sq Epi: x / Bacteria: x        CAPILLARY BLOOD GLUCOSE

## 2024-09-30 NOTE — PROGRESS NOTE ADULT - ASSESSMENT
MICA LE is a 70 y/o male RHD with a PMH of HTN, HLD who presents to [facility] on 24 with acute onset gait difficulties, left side weakness, slurred speech, intermittent dizziness, and diplopia secondary to R pontine and L callosal infarct. Hospital course significant for elevated LFTs/ cholelithiasis (to be managed as outpt).    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  - CTH ()- No hydrocephalus, acute intracranial hemorrhage, mass effect, or brain edema.  - MR Head  ()- : Acute/subacute infarction within the right side of the elba as well as left lateral genu of the corpus callosum with associated cytotoxic edema and without associated hemorrhage.   - TTE ()- normal LV function  - S/P ILR for afib surveillance  - Aspirin 81mg daily  - Atorvastatin 40mg HS- LFTs improving with reduction, reviewed with patient and wife  - continue comprehensive rehab program, 3 hours a day, 5 days a week. OT PT SLP  - recreation therapy and psychology consult  - Precautions: cardiac, fall, vision, ILR    # HTN  - Losartan 100mg daily  - BP (115/72 - 142/89)     # HLD  - Decreased to Atorvastatin 40mg HS     # cholelithiasis  # transaminitis   - CT C/A/P ()- Cholelithiasis with nonspecific thickening of the gallbladder wall. Correlate with LFTs.  - AST  104[H]  /  ALT  177[H]  /  AlkPhos  102  . -> AST/ALT/ALK 96/58/125 -> 40/100/93    - avoid hepatotoxic meds, decreased atorvastatin and DC tylenol.   - outpatient GI follow up    # Sleep:   - Maintain quiet hours and low stim environment.  - Melatonin 6mg PRN to maximize participation in therapy during the day.     # Pain Management:  - Tylenol stopped    # GI/Bowel:  - Senna QHS, Miralax PRN Daily  - GI ppx: Pantoprazole 40mg    # /Bladder:   - PVR x1 on admission (SC if > 400)  - No concerns    # Skin/Pressure Injury:   - Skin assessment on admission: loop recorder site c/d/i  - Itching scalp, resistant dandruff. Ketoconazole shampoo ordered, discussed with patient and wife    # Diet  - Regular- DASH    #  DVT ppx:  -  Lovenox 40mg daily    # Case discussed in IDT rounds  (initial):  - regular solid thin liquids, mild deficits quantitaive reasoning, set up eating, supervision oral hygiene, mod assist toileting and toilet transfers, mod assist bathing and dressing, independent bed mobility, CG transfers, ambulates 150 feet with min assist, 8 steps with 1 HR and min assist. Interested in art for rec therapy, retired olesya  - barriers: LUE weakness, visual perceptual deficits, reduced balance, coordination, dizziness  - goals: ambulate to commode with supervision, perform toileting tasks with set up (patient prefers not to rely on family)  - target: supervision with iADLs, set up toileting and dressing, supervision transfers, supervision ambulation. 10/10 [place] [facility] caregiver training     # LABS   CBC CMP on     ---------------  Code Status: FULL  Emergency Contact: SON: EDILMA [phone]    PCP Dr Lynn: [phone]    Outpatient Follow-up (Specialty/Name of physician):    Libman, Richard Benjamin  Neurology  [address]  [place] [address]  Phone: [phone]  Fax: [phone]  Follow Up Time: 2 weeks    Edin Neves  Cardiology  [address] [facility] [facility], [address]  [place] [address]  Phone: [phone]  Fax: [phone]  Follow Up Time: 2 weeks    Felice Hazel  Cardiac Electrophysiology  [address] [place] [address], [address]  [place] [address]  Phone: [phone]  Fax: [phone]  Follow Up Time: 2 weeks    Felice Herrera  Gastroenterology  [address] [place] [address]  Phone: [phone]  Fax: [phone]  Follow Up Time: 2 weeks MICA LE is a 70 y/o male RHD with a PMH of HTN, HLD who presents to [facility] on 24 with acute onset gait difficulties, left side weakness, slurred speech, intermittent dizziness, and diplopia secondary to R pontine and L callosal infarct. Hospital course significant for elevated LFTs/ cholelithiasis (to be managed as outpt).    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  - MR Head  ()- : Acute/subacute infarction within the right side of the elba as well as left lateral genu of the corpus callosum with associated cytotoxic edema and without associated hemorrhage.   - TTE ()- normal LV function  - S/P ILR for afib surveillance  - Aspirin 81mg daily  - Atorvastatin 40mg HS- LFTs improving with reduction  - continue comprehensive rehab program, 3 hours a day, 5 days a week. OT PT SLP  - recreation therapy and psychology consult  - Precautions: cardiac, fall, vision, ILR    # HTN  - Losartan 100mg daily  - BP (115/72 - 142/89)     # HLD  - Decreased to Atorvastatin 40mg HS     # cholelithiasis  # transaminitis   - CT C/A/P ()- Cholelithiasis with nonspecific thickening of the gallbladder wall. Correlate with LFTs.  - AST  104[H]  /  ALT  177[H]  /  AlkPhos  102  . -> AST/ALT/ALK 96/58/125 -> 40/100/93    - avoid hepatotoxic meds  - decreased atorvastatin and DC tylenol.   - outpatient GI follow up    # Sleep:   - Maintain quiet hours and low stim environment.  - Melatonin 6mg PRN to maximize participation in therapy during the day.     # Pain Management:  - Tylenol stopped    # GI/Bowel:  - Senna QHS, Miralax PRN Daily  - GI ppx: Pantoprazole 40mg    # /Bladder:   - PVR x1 on admission (SC if > 400)  - No concerns    # Skin/Pressure Injury:   - Skin assessment on admission: loop recorder site c/d/i  - Itching scalp, resistant dandruff. Ketoconazole shampoo ordered, discussed with patient and wife    # Diet  - Regular- DASH    #  DVT ppx:  -  Lovenox 40mg daily    # Case discussed in IDT rounds  (initial):  - regular solid thin liquids, mild deficits quantitaive reasoning, set up eating, supervision oral hygiene, mod assist toileting and toilet transfers, mod assist bathing and dressing, independent bed mobility, CG transfers, ambulates 150 feet with min assist, 8 steps with 1 HR and min assist. Interested in art for rec therapy, retired [employer]  - barriers: LUE weakness, visual perceptual deficits, reduced balance, coordination, dizziness  - goals: ambulate to commode with supervision, perform toileting tasks with set up (patient prefers not to rely on family)  - target: supervision with iADLs, set up toileting and dressing, supervision transfers, supervision ambulation. 10/10 [place] home [facility] caregiver training     # LABS   CBC CMP 10/3    ---------------  Code Status: FULL  Emergency Contact: SON: EDILMA [phone]    PCP Dr Lynn: [phone]    Outpatient Follow-up (Specialty/Name of physician):    Libman, Richard Benjamin  Neurology  [address]  [place] [address]  Phone: [phone]  Fax: [phone]  Follow Up Time: 2 weeks    Edin Neves  Cardiology  [address] [facility] [address] [facility], [address]  [place] [address]  Phone: [phone]  Fax: [phone]  Follow Up Time: 2 weeks    Felice Hazel  Cardiac Electrophysiology  [address] [place] [address], [address]  [place] [address]  Phone: [phone]  Fax: [phone]  Follow Up Time: 2 weeks    Felice Herrera  Gastroenterology  [address] [place] [address]  Phone: [phone]  Fax: [phone]  Follow Up Time: 2 weeks MICA LE is a 70 y/o male RHD with a PMH of HTN, HLD who presents to [facility] on 24 with acute onset gait difficulties, left side weakness, slurred speech, intermittent dizziness, and diplopia secondary to R pontine and L callosal infarct. Hospital course significant for elevated LFTs/ cholelithiasis (to be managed as outpt).    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  - MR Head  ()- : Acute/subacute infarction within the right side of the elba as well as left lateral genu of the corpus callosum with associated cytotoxic edema and without associated hemorrhage.   - TTE ()- normal LV function  - S/P ILR for afib surveillance  - Aspirin 81mg daily  - Atorvastatin 40mg HS- LFTs improving with reduction  - continue comprehensive rehab program, 3 hours a day, 5 days a week. OT PT SLP  - recreation therapy and psychology consult  - Precautions: cardiac, fall, vision, ILR    # HTN  - Losartan 100mg daily  - BP (115/72 - 142/89)     # HLD  - Decreased to Atorvastatin 40mg HS     # cholelithiasis  # transaminitis   - CT C/A/P ()- Cholelithiasis with nonspecific thickening of the gallbladder wall. Correlate with LFTs.  - AST  104[H]  /  ALT  177[H]  /  AlkPhos  102  . -> AST/ALT/ALK 96/58/125 -> 40/100/93    - avoid hepatotoxic meds  - decreased atorvastatin and DC tylenol.   - outpatient GI follow up    # Sleep:   - Maintain quiet hours and low stim environment.  - Melatonin 6mg PRN to maximize participation in therapy during the day.     # Pain Management:  - Tylenol stopped    # GI/Bowel:  - Senna QHS, Miralax PRN Daily  - GI ppx: Pantoprazole 40mg    # /Bladder:   - PVR x1 on admission (SC if > 400)  - No concerns    # Skin/Pressure Injury:   - Skin assessment on admission: loop recorder site c/d/i  - Itching scalp, resistant dandruff. Ketoconazole shampoo ordered, discussed with patient and wife. Needs additional dose, (only received x1) ordered 9./30    # Diet  - Regular- DASH    #  DVT ppx:  -  Lovenox 40mg daily    # Case discussed in IDT rounds  (initial):  - regular solid thin liquids, mild deficits quantitaive reasoning, set up eating, supervision oral hygiene, mod assist toileting and toilet transfers, mod assist bathing and dressing, independent bed mobility, CG transfers, ambulates 150 feet with min assist, 8 steps with 1 HR and min assist. Interested in art for rec therapy, retired olesya  - barriers: LUE weakness, visual perceptual deficits, reduced balance, coordination, dizziness  - goals: ambulate to commode with supervision, perform toileting tasks with set up (patient prefers not to rely on family)  - target: supervision with iADLs, set up toileting and dressing, supervision transfers, supervision ambulation. 10/10 [facility] caregiver training     # LABS   CBC CMP 10/3    ---------------  Code Status: FULL  Emergency Contact: SON: EDILMA [phone]    PCP Dr Lynn: [phone]    Outpatient Follow-up (Specialty/Name of physician):    Libman, Richard Benjamin  Neurology  [address]  [place] [address]  Phone: [phone]  Fax: [phone]  Follow Up Time: 2 weeks    Edin Neves  Cardiology  [address] [facility] [address], [address]  [place] [address]  Phone: [phone]  Fax: [phone]  Follow Up Time: 2 weeks    Felice Hazel  Cardiac Electrophysiology  [address] [place] [address], [address]  [place] [address]  Phone: [phone]  Fax: [phone]  Follow Up Time: 2 weeks    Felice Herrera  Gastroenterology  [address] [place] [address]  Phone: [phone]  Fax: [phone]  Follow Up Time: 2 weeks

## 2024-09-30 NOTE — PROGRESS NOTE ADULT - ATTENDING COMMENTS
Progress note amended to include my discussions with patient, resident, hospitalist, RN, SW, PT and my findings        RECENT LABS    Vital Signs Last 24 Hrs  T(C): 36.7 (30 Sep 2024 07:50), Max: 36.7 (29 Sep 2024 16:34)  T(F): 98.1 (30 Sep 2024 07:50), Max: 98.1 (29 Sep 2024 16:34)  HR: 62 (30 Sep 2024 07:50) (62 - 79)  BP: 115/72 (30 Sep 2024 07:50) (115/72 - 127/74)  BP(mean): --  RR: 16 (30 Sep 2024 07:50) (14 - 16)  SpO2: 96% (30 Sep 2024 07:50) (96% - 98%)    Parameters below as of 30 Sep 2024 07:50  Patient On (Oxygen Delivery Method): room air                              14.5   6.21  )-----------( 264      ( 30 Sep 2024 06:05 )             44.1     09-30    142  |  105  |  15  ----------------------------<  111[H]  4.5   |  29  |  1.00    Ca    9.2      30 Sep 2024 06:05    TPro  7.2  /  Alb  3.7  /  TBili  0.7  /  DBili  x   /  AST  40  /  ALT  100[H]  /  AlkPhos  93  09-30      Urinalysis Basic - ( 30 Sep 2024 06:05 )    Color: x / Appearance: x / SG: x / pH: x  Gluc: 111 mg/dL / Ketone: x  / Bili: x / Urobili: x   Blood: x / Protein: x / Nitrite: x   Leuk Esterase: x / RBC: x / WBC x   Sq Epi: x / Non Sq Epi: x / Bacteria: x      CAPILLARY BLOOD GLUCOSE Progress note amended to include my discussions with patient, resident, hospitalist, RN, SW, PT and my findings    Patient seen in OT with wife in attendance. He is performing left UE exercises with pole, abduction, and had also performed quadriped exercises on mat prior. Improved AROM abduction to >90 degrees.  He still expresses frustration; "no good" despite noted improvement. Encouragement provided. Wife reports that he has had nizoral shampoo x 1 out of two doses ordered (confirmed with staff) to receive second today. No complaints of pain, no B/B complaints.     Vitals and labs reviewed. ALT remains elevated but overall improving. Remainder labs and vitals stable.    Continue current program. For f/u IDT rounds 10/2.    RECENT LABS    Vital Signs Last 24 Hrs  T(C): 36.7 (30 Sep 2024 07:50), Max: 36.7 (29 Sep 2024 16:34)  T(F): 98.1 (30 Sep 2024 07:50), Max: 98.1 (29 Sep 2024 16:34)  HR: 62 (30 Sep 2024 07:50) (62 - 79)  BP: 115/72 (30 Sep 2024 07:50) (115/72 - 127/74)  BP(mean): --  RR: 16 (30 Sep 2024 07:50) (14 - 16)  SpO2: 96% (30 Sep 2024 07:50) (96% - 98%)    Parameters below as of 30 Sep 2024 07:50  Patient On (Oxygen Delivery Method): room air                              14.5   6.21  )-----------( 264      ( 30 Sep 2024 06:05 )             44.1     09-30    142  |  105  |  15  ----------------------------<  111[H]  4.5   |  29  |  1.00    Ca    9.2      30 Sep 2024 06:05    TPro  7.2  /  Alb  3.7  /  TBili  0.7  /  DBili  x   /  AST  40  /  ALT  100[H]  /  AlkPhos  93  09-30      Urinalysis Basic - ( 30 Sep 2024 06:05 )    Color: x / Appearance: x / SG: x / pH: x  Gluc: 111 mg/dL / Ketone: x  / Bili: x / Urobili: x   Blood: x / Protein: x / Nitrite: x   Leuk Esterase: x / RBC: x / WBC x   Sq Epi: x / Non Sq Epi: x / Bacteria: x      CAPILLARY BLOOD GLUCOSE

## 2024-09-30 NOTE — PROGRESS NOTE ADULT - SUBJECTIVE AND OBJECTIVE BOX
PROGRESS NOTE:     Patient is a 69y old  Male who presents with a chief complaint of R pontine and L callosal infarct (30 Sep 2024 08:19)      SUBJECTIVE / OVERNIGHT EVENTS: pt was seen and evaluated today  no complains offered      ADDITIONAL REVIEW OF SYSTEMS:    MEDICATIONS  (STANDING):  aspirin  chewable 81 milliGRAM(s) Oral daily  atorvastatin 40 milliGRAM(s) Oral at bedtime  bacitracin   Ointment 1 Application(s) Topical daily  enoxaparin Injectable 40 milliGRAM(s) SubCutaneous every 24 hours  ketoconazole 2% Shampoo 1 Application(s) Topical once  losartan 100 milliGRAM(s) Oral daily  pantoprazole    Tablet 40 milliGRAM(s) Oral before breakfast  polyethylene glycol 3350 17 Gram(s) Oral daily  senna 2 Tablet(s) Oral at bedtime    MEDICATIONS  (PRN):  acetaminophen     Tablet .. 650 milliGRAM(s) Oral every 6 hours PRN Mild Pain (1 - 3)  melatonin 6 milliGRAM(s) Oral at bedtime PRN Insomnia      CAPILLARY BLOOD GLUCOSE        I&O's Summary      PHYSICAL EXAM:  Vital Signs Last 24 Hrs  T(C): 36.7 (30 Sep 2024 07:50), Max: 36.7 (29 Sep 2024 16:34)  T(F): 98.1 (30 Sep 2024 07:50), Max: 98.1 (29 Sep 2024 16:34)  HR: 62 (30 Sep 2024 07:50) (62 - 67)  BP: 115/72 (30 Sep 2024 07:50) (115/72 - 127/74)  BP(mean): --  RR: 16 (30 Sep 2024 07:50) (14 - 16)  SpO2: 96% (30 Sep 2024 07:50) (96% - 97%)    Parameters below as of 30 Sep 2024 07:50  Patient On (Oxygen Delivery Method): room air    PHYSICAL EXAM:  General: NAD, A/O   ENT: MMM, no scleral icterus  Neck: Supple, No JVD, no thyroidomegaly  Lungs: Clear to auscultation bilaterally, no wheezes, no rales, no rhonchi, good inspiratory effort  Cardio: RRR, S1/S2, No murmurs  Abdomen: Soft, Nontender, Nondistended; Bowel sounds present  Extremities: No calf tenderness, No pitting edema, no skin changes      LABS:                        14.5   6.21  )-----------( 264      ( 30 Sep 2024 06:05 )             44.1     09-30    142  |  105  |  15  ----------------------------<  111[H]  4.5   |  29  |  1.00    Ca    9.2      30 Sep 2024 06:05    TPro  7.2  /  Alb  3.7  /  TBili  0.7  /  DBili  x   /  AST  40  /  ALT  100[H]  /  AlkPhos  93  09-30          Urinalysis Basic - ( 30 Sep 2024 06:05 )    Color: x / Appearance: x / SG: x / pH: x  Gluc: 111 mg/dL / Ketone: x  / Bili: x / Urobili: x   Blood: x / Protein: x / Nitrite: x   Leuk Esterase: x / RBC: x / WBC x   Sq Epi: x / Non Sq Epi: x / Bacteria: x          RADIOLOGY & ADDITIONAL TESTS:  Results Reviewed:  yes  Imaging Personally Reviewed: yes  Electrocardiogram Personally Reviewed: yes    COORDINATION OF CARE:  Care Discussed with Consultants/Other Providers [Y/N]: y   Prior or Outpatient Records Reviewed [Y/N]: y

## 2024-10-01 PROCEDURE — 99232 SBSQ HOSP IP/OBS MODERATE 35: CPT

## 2024-10-01 RX ADMIN — PANTOPRAZOLE SODIUM 40 MILLIGRAM(S): 40 TABLET, DELAYED RELEASE ORAL at 05:34

## 2024-10-01 RX ADMIN — BACITRACIN 1 APPLICATION(S): 500 OINTMENT TOPICAL at 12:50

## 2024-10-01 RX ADMIN — ATORVASTATIN CALCIUM 40 MILLIGRAM(S): 10 TABLET, FILM COATED ORAL at 21:07

## 2024-10-01 RX ADMIN — Medication 2 TABLET(S): at 21:07

## 2024-10-01 RX ADMIN — LOSARTAN POTASSIUM 100 MILLIGRAM(S): 100 TABLET, FILM COATED ORAL at 05:34

## 2024-10-01 RX ADMIN — ENOXAPARIN SODIUM 40 MILLIGRAM(S): 150 INJECTION SUBCUTANEOUS at 12:50

## 2024-10-01 RX ADMIN — Medication 81 MILLIGRAM(S): at 12:50

## 2024-10-01 NOTE — PROGRESS NOTE ADULT - SUBJECTIVE AND OBJECTIVE BOX
Patient is a 69y old  Male who presents with a chief complaint of R pontine and L callosal infarct (30 Sep 2024 11:48)      HPI:  68 y/o male RH-dominant, with a PMH of HTN, HLD who presents to [facility] on 9/19/24 with acute onset gait difficulties, left side weakness, slurred speech, intermittent dizziness, and diplopia. LKN is 0700 am 9/18. 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 TTE showed normal LV function. Patient was not a candidate for TNK as he was outside of appropriate window, nor mechanical thrombectomy as there was no LVO. Aspirin and high dose statin was initiated.    Hospital course significant for elevated LFTs, CT C/A/P showed cholelithiasis, patient is recommended to f/u with outpatient GI as he is asymptomatic.     Patient optimized and was evaluated by PM&R and therapy for functional deficits, gait/ADL impairments and acute rehabilitation was recommended. Patient was cleared for discharge to [facility] IRF on 9/23/24. (23 Sep 2024 15:20)      PAST MEDICAL & SURGICAL HISTORY:  Hypertriglyceridemia      HTN (hypertension)      Fracture  olecrenon process      S/P inguinal hernia repair  bilateral          MEDICATIONS  (STANDING):  aspirin  chewable 81 milliGRAM(s) Oral daily  atorvastatin 40 milliGRAM(s) Oral at bedtime  bacitracin   Ointment 1 Application(s) Topical daily  enoxaparin Injectable 40 milliGRAM(s) SubCutaneous every 24 hours  losartan 100 milliGRAM(s) Oral daily  pantoprazole    Tablet 40 milliGRAM(s) Oral before breakfast  polyethylene glycol 3350 17 Gram(s) Oral daily  senna 2 Tablet(s) Oral at bedtime    MEDICATIONS  (PRN):  acetaminophen     Tablet .. 650 milliGRAM(s) Oral every 6 hours PRN Mild Pain (1 - 3)  melatonin 6 milliGRAM(s) Oral at bedtime PRN Insomnia      Allergies    grass (Sneezing)  No Known Drug Allergies  dust (Sneezing)    Intolerances          VITALS  69y  Vital Signs Last 24 Hrs  T(C): 36.6 (01 Oct 2024 08:00), Max: 36.6 (30 Sep 2024 20:35)  T(F): 97.9 (01 Oct 2024 08:00), Max: 97.9 (01 Oct 2024 08:00)  HR: 61 (01 Oct 2024 08:00) (60 - 72)  BP: 117/71 (01 Oct 2024 08:00) (116/73 - 120/60)  BP(mean): --  RR: 16 (01 Oct 2024 08:00) (15 - 16)  SpO2: 96% (01 Oct 2024 08:00) (96% - 96%)    Parameters below as of 01 Oct 2024 08:00  Patient On (Oxygen Delivery Method): room air      Daily     Daily         RECENT LABS:                          14.5   6.21  )-----------( 264      ( 30 Sep 2024 06:05 )             44.1     09-30    142  |  105  |  15  ----------------------------<  111[H]  4.5   |  29  |  1.00    Ca    9.2      30 Sep 2024 06:05    TPro  7.2  /  Alb  3.7  /  TBili  0.7  /  DBili  x   /  AST  40  /  ALT  100[H]  /  AlkPhos  93  09-30    LIVER FUNCTIONS - ( 30 Sep 2024 06:05 )  Alb: 3.7 g/dL / Pro: 7.2 g/dL / ALK PHOS: 93 U/L / ALT: 100 U/L / AST: 40 U/L / GGT: x             Urinalysis Basic - ( 30 Sep 2024 06:05 )    Color: x / Appearance: x / SG: x / pH: x  Gluc: 111 mg/dL / Ketone: x  / Bili: x / Urobili: x   Blood: x / Protein: x / Nitrite: x   Leuk Esterase: x / RBC: x / WBC x   Sq Epi: x / Non Sq Epi: x / Bacteria: x          CAPILLARY BLOOD GLUCOSE

## 2024-10-01 NOTE — PROGRESS NOTE ADULT - ASSESSMENT
MICA LE is a 70 y/o male RHD with a PMH of HTN, HLD who presents to [facility] on 24 with acute onset gait difficulties, left side weakness, slurred speech, intermittent dizziness, and diplopia secondary to R pontine and L callosal infarct. Hospital course significant for elevated LFTs/ cholelithiasis (to be managed as outpt).    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  - MR Head  ()- : Acute/subacute infarction within the right side of the elba as well as left lateral genu of the corpus callosum with associated cytotoxic edema and without associated hemorrhage.   - TTE ()- normal LV function  - S/P ILR for afib surveillance  - Aspirin 81mg daily  - Atorvastatin 40mg HS- LFTs improving with reduction  - continue comprehensive rehab program, 3 hours a day, 5 days a week. OT PT SLP  - recreation therapy and psychology consult  - Precautions: cardiac, fall, vision, ILR    # HTN  - Losartan 100mg daily  - BP (115/72 - 142/89)     # HLD  - Decreased to Atorvastatin 40mg HS     # cholelithiasis  # transaminitis   - CT C/A/P ()- Cholelithiasis with nonspecific thickening of the gallbladder wall. Correlate with LFTs.  - AST  104[H]  /  ALT  177[H]  /  AlkPhos  102  . -> AST/ALT/ALK 96/58/125 -> 40/100/93    - avoid hepatotoxic meds  - decreased atorvastatin and DC tylenol.   - outpatient GI follow up    # Sleep:   - Maintain quiet hours and low stim environment.  - Melatonin 6mg PRN to maximize participation in therapy during the day.     # Pain Management:  - Tylenol stopped    # GI/Bowel:  - Senna QHS, Miralax PRN Daily  - GI ppx: Pantoprazole 40mg    # /Bladder:   - PVR x1 on admission (SC if > 400)  - No concerns    # Skin/Pressure Injury:   - Skin assessment on admission: loop recorder site c/d/i  - Itching scalp, resistant dandruff. Ketoconazole shampoo ordered, discussed with patient and wife. Needs additional dose, (only received x1) ordered 9./30    # Diet  - Regular- DASH    #  DVT ppx:  -  Lovenox 40mg daily    # Case discussed in IDT rounds  (initial):  - regular solid thin liquids, mild deficits quantitaive reasoning, set up eating, supervision oral hygiene, mod assist toileting and toilet transfers, mod assist bathing and dressing, independent bed mobility, CG transfers, ambulates 150 feet with min assist, 8 steps with 1 HR and min assist. Interested in art for rec therapy, retired [employer]  - barriers: LUE weakness, visual perceptual deficits, reduced balance, coordination, dizziness  - goals: ambulate to commode with supervision, perform toileting tasks with set up (patient prefers not to rely on family)  - target: supervision with iADLs, set up toileting and dressing, supervision transfers, supervision ambulation. 10/10 [facility] caregiver training     # LABS   CBC CMP 10/3    ---------------  Code Status: FULL  Emergency Contact: SON: EDILMA [phone]    PCP Dr Lynn: [phone]    Outpatient Follow-up (Specialty/Name of physician):    Libman, Richard Benjamin  Neurology  [address], [address]  [place] [address]  Phone: [phone]  Fax: [phone]  Follow Up Time: 2 weeks    Edin Neves  Cardiology  [address], [address]  [place] [address]  Phone: [phone]  Fax: [phone]  Follow Up Time: 2 weeks    Felice Hazel  Cardiac Electrophysiology  [address] [place] [address], [id number]  [place] [address]  Phone: [phone]  Fax: [phone]  Follow Up Time: 2 weeks    Felice Herrera  Gastroenterology  [address] [place] [address]  Phone: [phone]  Fax: [phone]  Follow Up Time: 2 weeks

## 2024-10-01 NOTE — PROGRESS NOTE ADULT - SUBJECTIVE AND OBJECTIVE BOX
Patient is a 69y old  Male who presents with a chief complaint of R pontine and L callosal infarct (29 Sep 2024 10:05)    HPI:  68 y/o male RH-dominant, with a PMH of HTN, HLD who presents to [facility] on 9/19/24 with acute onset gait difficulties, left side weakness, slurred speech, intermittent dizziness, and diplopia. LKN is 0700 am 9/18. 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 TTE showed normal LV function. Patient was not a candidate for TNK as he was outside of appropriate window, nor mechanical thrombectomy as there was no LVO. Aspirin and high dose statin was initiated.    Hospital course significant for elevated LFTs, CT C/A/P showed cholelithiasis, patient is recommended to f/u with outpatient GI as he is asymptomatic.     Patient optimized and was evaluated by PM&R and therapy for functional deficits, gait/ADL impairments and acute rehabilitation was recommended. Patient was cleared for discharge to [facility] IRF on 9/23/24. (23 Sep 2024 15:20)      SUBJECTIVE  Interval History:  Patient seen walking in the hallway with CG and examined in gym this morning. No new complaints. No acute events overnight.  BM yesterday. Denies dysuria.     Allergies    grass (Sneezing)  No Known Drug Allergies  dust (Sneezing)    Intolerances        OBJECTIVE    69y  Vital Signs Last 24 Hrs                          14.5   6.21  )-----------( 264      ( 30 Sep 2024 06:05 )             44.1     09-30    142  |  105  |  15  ----------------------------<  111[H]  4.5   |  29  |  1.00    Ca    9.2      30 Sep 2024 06:05    TPro  7.2  /  Alb  3.7  /  TBili  0.7  /  DBili  x   /  AST  40  /  ALT  100[H]  /  AlkPhos  93  09-30    LIVER FUNCTIONS - ( 30 Sep 2024 06:05 )  Alb: 3.7 g/dL / Pro: 7.2 g/dL / ALK PHOS: 93 U/L / ALT: 100 U/L / AST: 40 U/L / GGT: x                 Urinalysis Basic - ( 30 Sep 2024 06:05 )    Color: x / Appearance: x / SG: x / pH: x  Gluc: 111 mg/dL / Ketone: x  / Bili: x / Urobili: x   Blood: x / Protein: x / Nitrite: x   Leuk Esterase: x / RBC: x / WBC x   Sq Epi: x / Non Sq Epi: x / Bacteria: x          Daily     Daily       MEDICATIONS  (STANDING):  aspirin  chewable 81 milliGRAM(s) Oral daily  atorvastatin 40 milliGRAM(s) Oral at bedtime  bacitracin   Ointment 1 Application(s) Topical daily  enoxaparin Injectable 40 milliGRAM(s) SubCutaneous every 24 hours  losartan 100 milliGRAM(s) Oral daily  pantoprazole    Tablet 40 milliGRAM(s) Oral before breakfast  polyethylene glycol 3350 17 Gram(s) Oral daily  senna 2 Tablet(s) Oral at bedtime    MEDICATIONS  (PRN):  acetaminophen     Tablet .. 650 milliGRAM(s) Oral every 6 hours PRN Mild Pain (1 - 3)  melatonin 6 milliGRAM(s) Oral at bedtime PRN Insomnia      RECENT LABS:                          14.5   6.21  )-----------( 264      ( 30 Sep 2024 06:05 )             44.1     09-30    142  |  105  |  15  ----------------------------<  111[H]  4.5   |  29  |  1.00    Ca    9.2      30 Sep 2024 06:05    TPro  7.2  /  Alb  3.7  /  TBili  0.7  /  DBili  x   /  AST  40  /  ALT  100[H]  /  AlkPhos  93  09-30    LIVER FUNCTIONS - ( 30 Sep 2024 06:05 )  Alb: 3.7 g/dL / Pro: 7.2 g/dL / ALK PHOS: 93 U/L / ALT: 100 U/L / AST: 40 U/L / GGT: x             Urinalysis Basic - ( 30 Sep 2024 06:05 )    Color: x / Appearance: x / SG: x / pH: x  Gluc: 111 mg/dL / Ketone: x  / Bili: x / Urobili: x   Blood: x / Protein: x / Nitrite: x   Leuk Esterase: x / RBC: x / WBC x   Sq Epi: x / Non Sq Epi: x / Bacteria: x        CAPILLARY BLOOD GLUCOSE    Review of Systems:   · General	negative  · Ophthalmologic	negative  · ENMT	negative  · Respiratory and Thorax	negative  · Cardiovascular	negative  · Gastrointestinal	negative  · Genitourinary	negative  · Musculoskeletal	negative  · Neurological Comments	L arm weakness     Physical Exam:   · Constitutional	well-groomed; no distress  · Respiratory	no respiratory distress; no use of accessory muscles  · Cardiovascular	regular rate and rhythm  · Gastrointestinal	soft; nontender; nondistended  · Motor	L arm weakness improving

## 2024-10-01 NOTE — PROGRESS NOTE ADULT - ASSESSMENT
MICA LE is a 68 y/o male RHD with a PMH of HTN, HLD who presents to [facility] on 24 with acute onset gait difficulties, left side weakness, slurred speech, intermittent dizziness, and diplopia secondary to R pontine and L callosal infarct. Hospital course significant for elevated LFTs/ cholelithiasis (to be managed as outpt).    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  - MR Head  ()- : Acute/subacute infarction within the right side of the elba as well as left lateral genu of the corpus callosum with associated cytotoxic edema and without associated hemorrhage.   - TTE ()- normal LV function  - S/P ILR for afib surveillance  - Aspirin 81mg daily  - Atorvastatin 40mg HS- LFTs improving with reduction  - continue comprehensive rehab program, 3 hours a day, 5 days a week. OT PT SLP  - recreation therapy and psychology consult  - Precautions: cardiac, fall, vision, ILR    # HTN  - Losartan 100mg daily  - BP (116/73 - 120/60) 10/1    # HLD  - Decreased to Atorvastatin 40mg HS     # cholelithiasis  # transaminitis   - CT C/A/P ()- Cholelithiasis with nonspecific thickening of the gallbladder wall. Correlate with LFTs.  - AST  104[H]  /  ALT  177[H]  /  AlkPhos  102  . -> AST/ALT/ALK 96/58/125 -> 40/100/93    - avoid hepatotoxic meds  - decreased atorvastatin and DC tylenol.   - CMP 10/3  - outpatient GI follow up    # Sleep:   - Maintain quiet hours and low stim environment.  - Melatonin 6mg PRN to maximize participation in therapy during the day.     # Pain Management:  - Tylenol stopped    # GI/Bowel:  - Senna QHS, Miralax PRN Daily  - GI ppx: Pantoprazole 40mg    # Skin/Pressure Injury:   - Skin assessment on admission: loop recorder site c/d/i  - Itching scalp, resistant dandruff. s/p Ketoconazole shampoo x2    # Diet  - Regular- DASH    #  DVT ppx:  -  Lovenox 40mg daily    # Case discussed in IDT rounds  (initial):  - regular solid thin liquids, mild deficits quantitative reasoning, set up eating, supervision oral hygiene, mod assist toileting and toilet transfers, mod assist bathing and dressing, independent bed mobility, CG transfers, ambulates 150 feet with min assist, 8 steps with 1 HR and min assist. Interested in art for rec therapy, retired [employer]  - barriers: LUE weakness, visual perceptual deficits, reduced balance, coordination, dizziness  - goals: ambulate to commode with supervision, perform toileting tasks with set up (patient prefers not to rely on family)  - target: supervision with iADLs, set up toileting and dressing, supervision transfers, supervision ambulation. 10/10 [facility] caregiver training     # LABS   CBC CMP 10/3    ---------------  Code Status: FULL  Emergency Contact: SON: EDILMA [phone]    PCP Dr Lynn: [phone]    Outpatient Follow-up (Specialty/Name of physician):    Libman, Richard Benjamin  Neurology  [address]  [place] [address]  Phone: [phone]  Fax: [phone]  Follow Up Time: 2 weeks    Edin Neves  Cardiology  [address], [address]  [place] [address]  Phone: [phone]  Fax: [phone]  Follow Up Time: 2 weeks    Felice Hazel  Cardiac Electrophysiology  [address] [place] [address], [id number]  [place] [address]  Phone: [phone]  Fax: [phone]  Follow Up Time: 2 weeks    Felice Herrera  Gastroenterology  [address] [place] [address]  Phone: [phone]  Fax: [phone]  Follow Up Time: 2 weeks

## 2024-10-02 PROCEDURE — 99232 SBSQ HOSP IP/OBS MODERATE 35: CPT

## 2024-10-02 RX ADMIN — ENOXAPARIN SODIUM 40 MILLIGRAM(S): 150 INJECTION SUBCUTANEOUS at 12:31

## 2024-10-02 RX ADMIN — Medication 81 MILLIGRAM(S): at 12:31

## 2024-10-02 RX ADMIN — Medication 2 TABLET(S): at 21:36

## 2024-10-02 RX ADMIN — ATORVASTATIN CALCIUM 40 MILLIGRAM(S): 10 TABLET, FILM COATED ORAL at 21:36

## 2024-10-02 RX ADMIN — PANTOPRAZOLE SODIUM 40 MILLIGRAM(S): 40 TABLET, DELAYED RELEASE ORAL at 06:16

## 2024-10-02 RX ADMIN — Medication 17 GRAM(S): at 12:31

## 2024-10-02 RX ADMIN — BACITRACIN 1 APPLICATION(S): 500 OINTMENT TOPICAL at 15:33

## 2024-10-02 RX ADMIN — LOSARTAN POTASSIUM 100 MILLIGRAM(S): 100 TABLET, FILM COATED ORAL at 06:16

## 2024-10-02 NOTE — PROGRESS NOTE ADULT - ASSESSMENT
68 y/o male RHD with a PMH of HTN, HLD who presents to [facility] on 9/19/24 with acute onset gait difficulties, left side weakness, slurred speech, intermittent dizziness, and diplopia secondary to R pontine and L callosal infarct. Hospital course significant for elevated LFTs/ cholelithiasis (to be managed as outpt).    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  - TTE (9/20)- normal LV function  - S/P ILR for afib surveillance  - Aspirin 81mg daily  - Atorvastatin dose will be decreased to 40mg HS due to increasing LFT's    # Transaminitis  - with h/o cholelithiasis. Asymptomatic  - decrease the dose of Lipitor to 40 mg hs and monitor    # HTN  - Losartan 100mg daily    # HLD  - Atorvastatin 40mg HS    # cholelithiasis  # Elevated LFTs  - CT C/A/P (9/23)- Cholelithiasis with nonspecific thickening of the gallbladder wall. Correlate with LFTs.  - avoid hepatotoxic meds  -elevated LFT'a but clinically pt is asymptomatic. No c/o abd pain, N/V  - outpatient GI follow up    DVT ppx- Lovenox

## 2024-10-02 NOTE — PROGRESS NOTE ADULT - SUBJECTIVE AND OBJECTIVE BOX
PROGRESS NOTE:     Patient is a 69y old  Male who presents with a chief complaint of R pontine and L callosal infarct (02 Oct 2024 09:16)      SUBJECTIVE / OVERNIGHT EVENTS: pt was seen and evaluated  no complains offered     ADDITIONAL REVIEW OF SYSTEMS: as per HPI    MEDICATIONS  (STANDING):  aspirin  chewable 81 milliGRAM(s) Oral daily  atorvastatin 40 milliGRAM(s) Oral at bedtime  bacitracin   Ointment 1 Application(s) Topical daily  enoxaparin Injectable 40 milliGRAM(s) SubCutaneous every 24 hours  losartan 100 milliGRAM(s) Oral daily  pantoprazole    Tablet 40 milliGRAM(s) Oral before breakfast  polyethylene glycol 3350 17 Gram(s) Oral daily  senna 2 Tablet(s) Oral at bedtime    MEDICATIONS  (PRN):  acetaminophen     Tablet .. 650 milliGRAM(s) Oral every 6 hours PRN Mild Pain (1 - 3)  melatonin 6 milliGRAM(s) Oral at bedtime PRN Insomnia      CAPILLARY BLOOD GLUCOSE        I&O's Summary      PHYSICAL EXAM:  Vital Signs Last 24 Hrs  T(C): 36.6 (02 Oct 2024 07:54), Max: 36.7 (02 Oct 2024 06:14)  T(F): 97.8 (02 Oct 2024 07:54), Max: 98 (02 Oct 2024 06:14)  HR: 58 (02 Oct 2024 07:54) (58 - 86)  BP: 117/73 (02 Oct 2024 07:54) (117/73 - 125/80)  BP(mean): --  RR: 16 (02 Oct 2024 07:54) (16 - 16)  SpO2: 94% (02 Oct 2024 07:54) (94% - 98%)    Parameters below as of 02 Oct 2024 07:54  Patient On (Oxygen Delivery Method): room air      PHYSICAL EXAM:  General: NAD, A/O   ENT: MMM, no scleral icterus  Neck: Supple, No JVD, no thyroidomegaly  Lungs: Clear to auscultation bilaterally, no wheezes, no rales, no rhonchi, good inspiratory effort  Cardio: RRR, S1/S2, No murmurs  Abdomen: Soft, Nontender, Nondistended; Bowel sounds present  Extremities: No calf tenderness, No pitting edema, no skin changes        LABS:               14.5   6.21  )-----------( 264      ( 30 Sep 2024 06:05 )             44.1     09-30    142  |  105  |  15  ----------------------------<  111[H]  4.5   |  29  |  1.00    Ca    9.2      30 Sep 2024 06:05    TPro  7.2  /  Alb  3.7  /  TBili  0.7  /  DBili  x   /  AST  40  /  ALT  100[H]  /  AlkPhos  93  09-30                  RADIOLOGY & ADDITIONAL TESTS:  Results Reviewed: yes   Imaging Personally Reviewed: yes  Electrocardiogram Personally Reviewed: yes    COORDINATION OF CARE:  Care Discussed with Consultants/Other Providers [Y/N]: yes  Prior or Outpatient Records Reviewed [Y/N]: yes

## 2024-10-02 NOTE — PROGRESS NOTE ADULT - ASSESSMENT
MICA LE is a 68 y/o male RHD with a PMH of HTN, HLD who presents to [facility] on 24 with acute onset gait difficulties, left side weakness, slurred speech, intermittent dizziness, and diplopia secondary to R pontine and L callosal infarct. Hospital course significant for elevated LFTs/ cholelithiasis (to be managed as outpt).    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  - MR Head  ()- : Acute/subacute infarction within the right side of the elba as well as left lateral genu of the corpus callosum with associated cytotoxic edema and without associated hemorrhage.   - S/P ILR for afib surveillance  - Aspirin 81mg daily  - Atorvastatin 40mg HS- LFTs improving with reduction  - continue comprehensive rehab program, 3 hours a day, 5 days a week. OT PT SLP  - recreation therapy and psychology consult  - Precautions: cardiac, fall, vision, ILR    # HTN  - Losartan 100mg daily  - BP (117/73 - 125/80) 10/2    # HLD  - Decreased to Atorvastatin 40mg HS     # cholelithiasis  # transaminitis   - CT C/A/P ()- Cholelithiasis with nonspecific thickening of the gallbladder wall. Correlate with LFTs.  - AST  104[H]  /  ALT  177[H]  /  AlkPhos  102  . -> AST/ALT/ALK 96/58/125 -> 40/100/93    - avoid hepatotoxic meds  - decreased atorvastatin and DC tylenol.   - CMP 10/3  - outpatient GI follow up    # Sleep:   - Maintain quiet hours and low stim environment.  - Melatonin 6mg PRN to maximize participation in therapy during the day.     # Pain Management:  - Tylenol stopped    # GI/Bowel:  - Senna QHS, Miralax PRN Daily  - GI ppx: Pantoprazole 40mg    # Skin/Pressure Injury:   - Skin assessment on admission: loop recorder site c/d/i  - Itching scalp, resistant dandruff. s/p Ketoconazole shampoo x2    # Diet  - Regular- DASH    #  DVT ppx:  -  Lovenox 40mg daily    # Case discussed in IDT rounds 10/2 (follow up)  -       - barriers: LUE weakness, visual perceptual deficits, reduced balance, coordination, dizziness  - goals: ambulate to commode with supervision, perform toileting tasks with set up (patient prefers not to rely on family)  - target: supervision with iADLs, set up toileting and dressing, supervision transfers, supervision ambulation. 10/10 [place] home [facility] caregiver training     # LABS   CBC CMP 10/3    ---------------  Code Status: FULL  Emergency Contact: SON: EDILMA [phone]    PCP Dr Lynn: [phone]    Outpatient Follow-up (Specialty/Name of physician):    Libman, Richard Benjamin  Neurology  [address] [facility] [address] [facility] [address]  [place] [address]  Phone: [phone]  Fax: [phone]  Follow Up Time: 2 weeks    Edin Neves  Cardiology  [address] [facility], [address]  [place] [address]  Phone: [phone]  Fax: [phone]  Follow Up Time: 2 weeks    Felice Hazel  Cardiac Electrophysiology  [address] [place] [address], [id number]  [place] [address]  Phone: [phone]  Fax: [phone]  Follow Up Time: 2 weeks    Felice Herrera  Gastroenterology  [address] [place] [address]  Phone: [phone]  Fax: [phone]  Follow Up Time: 2 weeks MICA LE is a 70 y/o male RHD with a PMH of HTN, HLD who presents to [facility] on 24 with acute onset gait difficulties, left side weakness, slurred speech, intermittent dizziness, and diplopia secondary to R pontine and L callosal infarct. Hospital course significant for elevated LFTs/ cholelithiasis (to be managed as outpt).    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  - MR Head  ()- : Acute/subacute infarction within the right side of the elba as well as left lateral genu of the corpus callosum with associated cytotoxic edema and without associated hemorrhage.   - S/P ILR for afib surveillance  - Aspirin 81mg daily  - Atorvastatin 40mg HS- LFTs improving with reduction  - continue comprehensive rehab program, 3 hours a day, 5 days a week. OT PT SLP  - recreation therapy and psychology consult  - Precautions: cardiac, fall, vision, ILR    # HTN  - Losartan 100mg daily  - BP (117/73 - 125/80) 10/2    # HLD  - Decreased to Atorvastatin 40mg HS     # cholelithiasis  # transaminitis   - CT C/A/P ()- Cholelithiasis with nonspecific thickening of the gallbladder wall. Correlate with LFTs.  - AST  104[H]  /  ALT  177[H]  /  AlkPhos  102  . -> AST/ALT/ALK 96/58/125 -> 40/100/93    - avoid hepatotoxic meds  - decreased atorvastatin and DC tylenol.   - CMP 10/3  - outpatient GI follow up    # Sleep:   - Maintain quiet hours and low stim environment.  - Melatonin 6mg PRN to maximize participation in therapy during the day.     # Pain Management:  - Tylenol stopped    # GI/Bowel:  - Senna QHS, Miralax PRN Daily  - GI ppx: Pantoprazole 40mg    # Skin/Pressure Injury:   - Skin assessment on admission: loop recorder site c/d/i  - Itching scalp, resistant dandruff. s/p Ketoconazole shampoo x2    # Diet  - Regular- DASH    #  DVT ppx:  -  Lovenox 40mg daily    # Case discussed in IDT rounds 10/2 (follow up)  - skin intact, continent B/B, eating and grooming set up, CG toileting, bathing min assist, UB supervision, LB CG, toilet and shower transfers CG-min assist, independent bed mobility, CG transfers, ambulation 115 feet QC and CG, 15 steps with 1 HR and min assist. Would recommend NBQC on dc  - barriers: LUE weakness, visual perceptual deficits, reduced balance, coordination, dizziness  - goals: ambulate to commode with supervision (progressing), perform toileting tasks with set up (progressing)  - target: supervision with iADLs, set up toileting and dressing, supervision transfers, supervision ambulation. 10/10 dc home [facility] caregiver training     # LABS   CBC CMP 10/3    ---------------  Code Status: FULL  Emergency Contact: SON: EDILMA [phone]    PCP Dr Lynn: [phone]    Outpatient Follow-up (Specialty/Name of physician):    Libman, Richard Benjamin  Neurology  [address] [facility] [address]  [place] [address]  Phone: [phone]  Fax: [phone]  Follow Up Time: 2 weeks    Edin Neves  Cardiology  [address], [address]  [place] [address]  Phone: [phone]  Fax: [phone]  Follow Up Time: 2 weeks    Felice Hazel  Cardiac Electrophysiology  [address] [place] [address], [address]  [place] [address]  Phone: [phone]  Fax: [phone]  Follow Up Time: 2 weeks    Felice Herrera  Gastroenterology  [address] [place] [address]  Phone: [phone]  Fax: [phone]  Follow Up Time: 2 weeks MICA LE is a 70 y/o male RHD with a PMH of HTN, HLD who presents to [facility] on 24 with acute onset gait difficulties, left side weakness, slurred speech, intermittent dizziness, and diplopia secondary to R pontine and L callosal infarct. Hospital course significant for elevated LFTs/ cholelithiasis (to be managed as outpt).    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  - MR Head  ()- : Acute/subacute infarction within the right side of the elba as well as left lateral genu of the corpus callosum with associated cytotoxic edema and without associated hemorrhage.   - S/P ILR for afib surveillance. Incision healing well, patient feels some irritation against shirt, DSD daily ordered  - Aspirin 81mg daily  - Atorvastatin 40mg HS- LFTs improving with reduction  - continue comprehensive rehab program, 3 hours a day, 5 days a week. OT PT SLP  - recreation therapy and psychology consult  - Precautions: cardiac, fall, vision, ILR    # HTN  - Losartan 100mg daily  - BP (117/73 - 125/80) 10/2    # HLD  - Decreased to Atorvastatin 40mg HS     # cholelithiasis  # transaminitis   - CT C/A/P ()- Cholelithiasis with nonspecific thickening of the gallbladder wall. Correlate with LFTs.  - AST  104[H]  /  ALT  177[H]  /  AlkPhos  102  . -> AST/ALT/ALK 96/58/125 -> 40/100/93    - avoid hepatotoxic meds  - decreased atorvastatin and DC tylenol.   - CMP 10/3  - outpatient GI follow up    # Sleep:   - Maintain quiet hours and low stim environment.  - Melatonin 6mg PRN to maximize participation in therapy during the day.     # Pain Management:  - Tylenol stopped    # GI/Bowel:  - Senna QHS, Miralax PRN Daily  - GI ppx: Pantoprazole 40mg    # Skin/Pressure Injury:   - Skin assessment on admission: loop recorder site c/d/i  - Itching scalp, resistant dandruff. s/p Ketoconazole shampoo x2. Improved but mild residual itching. will order one more dose, patient and wife agreeable    # Diet  - Regular- DASH    #  DVT ppx:  -  Lovenox 40mg daily    # Case discussed in IDT rounds 10/2 (follow up)  - skin intact, continent B/B, eating and grooming set up, CG toileting, bathing min assist, UB supervision, LB CG, toilet and shower transfers CG-min assist, independent bed mobility, CG transfers, ambulation 115 feet QC and CG, 15 steps with 1 HR and min assist. Would recommend NBQC on dc  - barriers: LUE weakness, visual perceptual deficits, reduced balance, coordination, dizziness  - goals: ambulate to commode with supervision (progressing), perform toileting tasks with set up (progressing)  - target: supervision with iADLs, set up toileting and dressing, supervision transfers, supervision ambulation. 10/10 dc home [facility] caregiver training     # LABS   CBC CMP 10/3    ---------------  Code Status: FULL  Emergency Contact: SON: EDILMA [phone]    PCP Dr Lynn: [phone]    Outpatient Follow-up (Specialty/Name of physician):    Libman, Richard Benjamin  Neurology  [address] [facility] [address]  [place] [address]  Phone: [phone]  Fax: [phone]  Follow Up Time: 2 weeks    Edin Neves  Cardiology  [address] [facility], [address]  [place] [address]  Phone: [phone]  Fax: [phone]  Follow Up Time: 2 weeks    Felice Hazel  Cardiac Electrophysiology  [address] [place] [address], [address]  [place] [address]  Phone: [phone]  Fax: [phone]  Follow Up Time: 2 weeks    Felice Herrera  Gastroenterology  [address] [place] [address]  Phone: [phone]  Fax: [phone]  Follow Up Time: 2 weeks

## 2024-10-02 NOTE — PROGRESS NOTE ADULT - SUBJECTIVE AND OBJECTIVE BOX
Patient is a 69y old  Male who presents with a chief complaint of R pontine and L callosal infarct (01 Oct 2024 11:47)      HPI:  70 y/o male RH-dominant, with a PMH of HTN, HLD who presents to [facility] on 9/19/24 with acute onset gait difficulties, left side weakness, slurred speech, intermittent dizziness, and diplopia. LKN is 0700 am 9/18. 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 TTE showed normal LV function. Patient was not a candidate for TNK as he was outside of appropriate window, nor mechanical thrombectomy as there was no LVO. Aspirin and high dose statin was initiated.    Hospital course significant for elevated LFTs, CT C/A/P showed cholelithiasis, patient is recommended to f/u with outpatient GI as he is asymptomatic.     Patient optimized and was evaluated by PM&R and therapy for functional deficits, gait/ADL impairments and acute rehabilitation was recommended. Patient was cleared for discharge to [facility] on 9/23/24. (23 Sep 2024 15:20)      PAST MEDICAL & SURGICAL HISTORY:  Hypertriglyceridemia      HTN (hypertension)      Fracture  olecrenon process      S/P inguinal hernia repair  bilateral          MEDICATIONS  (STANDING):  aspirin  chewable 81 milliGRAM(s) Oral daily  atorvastatin 40 milliGRAM(s) Oral at bedtime  bacitracin   Ointment 1 Application(s) Topical daily  enoxaparin Injectable 40 milliGRAM(s) SubCutaneous every 24 hours  losartan 100 milliGRAM(s) Oral daily  pantoprazole    Tablet 40 milliGRAM(s) Oral before breakfast  polyethylene glycol 3350 17 Gram(s) Oral daily  senna 2 Tablet(s) Oral at bedtime    MEDICATIONS  (PRN):  acetaminophen     Tablet .. 650 milliGRAM(s) Oral every 6 hours PRN Mild Pain (1 - 3)  melatonin 6 milliGRAM(s) Oral at bedtime PRN Insomnia      Allergies    grass (Sneezing)  No Known Drug Allergies  dust (Sneezing)    Intolerances          VITALS  69y  Vital Signs Last 24 Hrs  T(C): 36.6 (02 Oct 2024 07:54), Max: 36.7 (02 Oct 2024 06:14)  T(F): 97.8 (02 Oct 2024 07:54), Max: 98 (02 Oct 2024 06:14)  HR: 58 (02 Oct 2024 07:54) (58 - 86)  BP: 117/73 (02 Oct 2024 07:54) (117/73 - 125/80)  BP(mean): --  RR: 16 (02 Oct 2024 07:54) (16 - 16)  SpO2: 94% (02 Oct 2024 07:54) (94% - 98%)    Parameters below as of 02 Oct 2024 07:54  Patient On (Oxygen Delivery Method): room air      Daily     Daily         RECENT LABS:                      CAPILLARY BLOOD GLUCOSE

## 2024-10-03 LAB
ALBUMIN SERPL ELPH-MCNC: 3.9 G/DL — SIGNIFICANT CHANGE UP (ref 3.3–5)
ALP SERPL-CCNC: 96 U/L — SIGNIFICANT CHANGE UP (ref 40–120)
ALT FLD-CCNC: 102 U/L — HIGH (ref 10–45)
ANION GAP SERPL CALC-SCNC: 11 MMOL/L — SIGNIFICANT CHANGE UP (ref 5–17)
AST SERPL-CCNC: 47 U/L — HIGH (ref 10–40)
BASOPHILS # BLD AUTO: 0.05 K/UL — SIGNIFICANT CHANGE UP (ref 0–0.2)
BASOPHILS NFR BLD AUTO: 0.7 % — SIGNIFICANT CHANGE UP (ref 0–2)
BILIRUB SERPL-MCNC: 0.8 MG/DL — SIGNIFICANT CHANGE UP (ref 0.2–1.2)
BUN SERPL-MCNC: 18 MG/DL — SIGNIFICANT CHANGE UP (ref 7–23)
CALCIUM SERPL-MCNC: 8.9 MG/DL — SIGNIFICANT CHANGE UP (ref 8.4–10.5)
CHLORIDE SERPL-SCNC: 107 MMOL/L — SIGNIFICANT CHANGE UP (ref 96–108)
CO2 SERPL-SCNC: 27 MMOL/L — SIGNIFICANT CHANGE UP (ref 22–31)
CREAT SERPL-MCNC: 1.17 MG/DL — SIGNIFICANT CHANGE UP (ref 0.5–1.3)
EGFR: 67 ML/MIN/1.73M2 — SIGNIFICANT CHANGE UP
EOSINOPHIL # BLD AUTO: 0.07 K/UL — SIGNIFICANT CHANGE UP (ref 0–0.5)
EOSINOPHIL NFR BLD AUTO: 1 % — SIGNIFICANT CHANGE UP (ref 0–6)
GLUCOSE SERPL-MCNC: 121 MG/DL — HIGH (ref 70–99)
HCT VFR BLD CALC: 46 % — SIGNIFICANT CHANGE UP (ref 39–50)
HGB BLD-MCNC: 14.9 G/DL — SIGNIFICANT CHANGE UP (ref 13–17)
IMM GRANULOCYTES NFR BLD AUTO: 0.6 % — SIGNIFICANT CHANGE UP (ref 0–0.9)
LYMPHOCYTES # BLD AUTO: 1.62 K/UL — SIGNIFICANT CHANGE UP (ref 1–3.3)
LYMPHOCYTES # BLD AUTO: 23.4 % — SIGNIFICANT CHANGE UP (ref 13–44)
MCHC RBC-ENTMCNC: 28.5 PG — SIGNIFICANT CHANGE UP (ref 27–34)
MCHC RBC-ENTMCNC: 32.4 GM/DL — SIGNIFICANT CHANGE UP (ref 32–36)
MCV RBC AUTO: 88 FL — SIGNIFICANT CHANGE UP (ref 80–100)
MONOCYTES # BLD AUTO: 0.51 K/UL — SIGNIFICANT CHANGE UP (ref 0–0.9)
MONOCYTES NFR BLD AUTO: 7.4 % — SIGNIFICANT CHANGE UP (ref 2–14)
NEUTROPHILS # BLD AUTO: 4.63 K/UL — SIGNIFICANT CHANGE UP (ref 1.8–7.4)
NEUTROPHILS NFR BLD AUTO: 66.9 % — SIGNIFICANT CHANGE UP (ref 43–77)
NRBC # BLD: 0 /100 WBCS — SIGNIFICANT CHANGE UP (ref 0–0)
PLATELET # BLD AUTO: 268 K/UL — SIGNIFICANT CHANGE UP (ref 150–400)
POTASSIUM SERPL-MCNC: 4.3 MMOL/L — SIGNIFICANT CHANGE UP (ref 3.5–5.3)
POTASSIUM SERPL-SCNC: 4.3 MMOL/L — SIGNIFICANT CHANGE UP (ref 3.5–5.3)
PROT SERPL-MCNC: 7.6 G/DL — SIGNIFICANT CHANGE UP (ref 6–8.3)
RBC # BLD: 5.23 M/UL — SIGNIFICANT CHANGE UP (ref 4.2–5.8)
RBC # FLD: 13.5 % — SIGNIFICANT CHANGE UP (ref 10.3–14.5)
SODIUM SERPL-SCNC: 145 MMOL/L — SIGNIFICANT CHANGE UP (ref 135–145)
WBC # BLD: 6.92 K/UL — SIGNIFICANT CHANGE UP (ref 3.8–10.5)
WBC # FLD AUTO: 6.92 K/UL — SIGNIFICANT CHANGE UP (ref 3.8–10.5)

## 2024-10-03 PROCEDURE — 99232 SBSQ HOSP IP/OBS MODERATE 35: CPT

## 2024-10-03 RX ORDER — SENNOSIDES 8.6 MG
2 TABLET ORAL AT BEDTIME
Refills: 0 | Status: DISCONTINUED | OUTPATIENT
Start: 2024-10-03 | End: 2024-10-10

## 2024-10-03 RX ADMIN — Medication 1 APPLICATION(S): at 06:07

## 2024-10-03 RX ADMIN — ATORVASTATIN CALCIUM 40 MILLIGRAM(S): 10 TABLET, FILM COATED ORAL at 21:24

## 2024-10-03 RX ADMIN — LOSARTAN POTASSIUM 100 MILLIGRAM(S): 100 TABLET, FILM COATED ORAL at 06:06

## 2024-10-03 RX ADMIN — Medication 17 GRAM(S): at 11:13

## 2024-10-03 RX ADMIN — ENOXAPARIN SODIUM 40 MILLIGRAM(S): 150 INJECTION SUBCUTANEOUS at 11:12

## 2024-10-03 RX ADMIN — PANTOPRAZOLE SODIUM 40 MILLIGRAM(S): 40 TABLET, DELAYED RELEASE ORAL at 06:06

## 2024-10-03 RX ADMIN — Medication 81 MILLIGRAM(S): at 11:13

## 2024-10-03 RX ADMIN — BACITRACIN 1 APPLICATION(S): 500 OINTMENT TOPICAL at 11:13

## 2024-10-03 NOTE — PROGRESS NOTE ADULT - ASSESSMENT
MICA LE is a 70 y/o male RHD with a PMH of HTN, HLD who presents to [facility] on 24 with acute onset gait difficulties, left side weakness, slurred speech, intermittent dizziness, and diplopia secondary to R pontine and L callosal infarct. Hospital course significant for elevated LFTs/ cholelithiasis (to be managed as outpt).    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  - MR Head  ()- : Acute/subacute infarction within the right side of the elba as well as left lateral genu of the corpus callosum with associated cytotoxic edema and without associated hemorrhage.   - S/P ILR for afib surveillance. DSD daily  - Aspirin 81mg daily  - Atorvastatin 40mg HS- LFTs improving with reduction  - continue comprehensive rehab program, 3 hours a day, 5 days a week. OT PT SLP  - recreation therapy and psychology consult  - Precautions: cardiac, fall, vision, ILR    # HTN  - Losartan 100mg daily  - BP (112/71 - 121/73) 10/3    # HLD  - Decreased to Atorvastatin 40mg HS     # cholelithiasis  # transaminitis   - CT C/A/P ()- Cholelithiasis with nonspecific thickening of the gallbladder wall. Correlate with LFTs.  - AST  104[H]  /  ALT  177[H]  /  AlkPhos  102  . ->  AST  47[H]  /  ALT  102[H]  /  AlkPhos  96  10-03 improved  - avoid hepatotoxic meds  - decreased atorvastatin  - DC tylenol.   - CMP 10/7  - outpatient GI follow up    # Sleep:   - Melatonin 6mg PRN    # Pain Management:  - Tylenol stopped    # GI/Bowel:  - Senna QHS, Miralax PRN Daily  - GI ppx: Pantoprazole 40mg    # Skin/Pressure Injury:   - Skin assessment on admission: loop recorder site c/d/i  - Itching scalp, resistant dandruff. s/p Ketoconazole shampoo x 3    # Diet  - Regular- DASH    #  DVT ppx:  -  Lovenox 40mg daily    # Case discussed in IDT rounds 10/2 (follow up)  - skin intact, continent B/B, eating and grooming set up, CG toileting, bathing min assist, UB supervision, LB CG, toilet and shower transfers CG-min assist, independent bed mobility, CG transfers, ambulation 115 feet QC and CG, 15 steps with 1 HR and min assist. Would recommend NBQC on dc  - barriers: LUE weakness, visual perceptual deficits, reduced balance, coordination, dizziness  - goals: ambulate to commode with supervision (progressing), perform toileting tasks with set up (progressing)  - target: supervision with iADLs, set up toileting and dressing, supervision transfers, supervision ambulation. 10/10 dc home [facility] caregiver training     # LABS   CBC CMP 10/7    ---------------  Code Status: FULL  Emergency Contact: SON: EDILMA [phone]    PCP Dr Lynn: [phone]    Outpatient Follow-up (Specialty/Name of physician):    Libman, Richard Benjamin  Neurology  [address] [facility] [address]  [place] [address]  Phone: [phone]  Fax: [phone]  Follow Up Time: 2 weeks    Edin Neves  Cardiology  [address] [facility]  [place] [address]  Phone: [phone]  Fax: [phone]  Follow Up Time: 2 weeks    Felice Hazel  Cardiac Electrophysiology  [address] [place] [address], [address]  [place] [address]  Phone: [phone]  Fax: [phone]  Follow Up Time: 2 weeks    Felice Herrera  Gastroenterology  [address] [place] [address]  Phone: [phone]  Fax: [phone]  Follow Up Time: 2 weeks MICA LE is a 70 y/o male RHD with a PMH of HTN, HLD who presents to [facility] on 24 with acute onset gait difficulties, left side weakness, slurred speech, intermittent dizziness, and diplopia secondary to R pontine and L callosal infarct. Hospital course significant for elevated LFTs/ cholelithiasis (to be managed as outpt).    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  - MR Head  ()- : Acute/subacute infarction within the right side of the elba as well as left lateral genu of the corpus callosum with associated cytotoxic edema and without associated hemorrhage.   - S/P ILR for afib surveillance. DSD daily  - Aspirin 81mg daily  - Atorvastatin 40mg HS- LFTs improving with reduction  - continue comprehensive rehab program, 3 hours a day, 5 days a week. OT PT SLP  - recreation therapy and psychology consult  - Precautions: cardiac, fall, vision, ILR    # HTN  - Losartan 100mg daily  - BP (112/71 - 121/73) 10/3    # HLD  - Decreased to Atorvastatin 40mg HS     # cholelithiasis  # transaminitis   - CT C/A/P ()- Cholelithiasis with nonspecific thickening of the gallbladder wall. Correlate with LFTs.  - AST  104[H]  /  ALT  177[H]  /  AlkPhos  102  . ->  AST  47[H]  /  ALT  102[H]  /  AlkPhos  96  10-03 improved  - avoid hepatotoxic meds  - decreased atorvastatin  - DC tylenol.   - CMP 10/7  - outpatient GI follow up    # Sleep:   - Melatonin 6mg PRN    # Pain Management:  - Tylenol stopped    # GI/Bowel:  - Senna QHS, Miralax PRN Daily  - GI ppx: Pantoprazole 40mg    # Skin/Pressure Injury:   - Skin assessment on admission: loop recorder site c/d/i  - Itching scalp, resistant dandruff. s/p Ketoconazole shampoo x 3    # Diet  - Regular- DASH    #  DVT ppx:  -  Lovenox 40mg daily    # Case discussed in IDT rounds 10/2 (follow up)  - skin intact, continent B/B, eating and grooming set up, CG toileting, bathing min assist, UB supervision, LB CG, toilet and shower transfers CG-min assist, independent bed mobility, CG transfers, ambulation 115 feet QC and CG, 15 steps with 1 HR and min assist. Would recommend NBQC on dc  - barriers: LUE weakness, visual perceptual deficits, reduced balance, coordination, dizziness  - goals: ambulate to commode with supervision (progressing), perform toileting tasks with set up (progressing)  - target: supervision with iADLs, set up toileting and dressing, supervision transfers, supervision ambulation. 10/10 dc home [facility] caregiver training     # LABS   CBC CMP 10/7    ---------------  Code Status: FULL  Emergency Contact: SON: EDILMA [phone]    PCP Dr Lynn: [phone]    Outpatient Follow-up (Specialty/Name of physician):    Libman, Richard Benjamin  Neurology  [address] [facility] [address]  [place] [address]  Phone: [phone]  Fax: [phone]  Follow Up Time: 2 weeks    Edin Neves  Cardiology  [address] [facility]  [place] [address]  Phone: [phone]  Fax: [phone]  Follow Up Time: 2 weeks    Felice Hazel  Cardiac Electrophysiology  [address] [place] [address], [address]  [place] [address]  Phone: [phone]  Fax: [phone]  Follow Up Time: 2 weeks    Felice Herrera  Gastroenterology  [address] [place] [address]  Phone: [phone]  Fax: [phone]  Follow Up Time: 2 weeks

## 2024-10-03 NOTE — PROGRESS NOTE ADULT - SUBJECTIVE AND OBJECTIVE BOX
Patient is a 69y old  Male who presents with a chief complaint of R pontine and L callosal infarct (02 Oct 2024 13:10)      HPI:  68 y/o male RH-dominant, with a PMH of HTN, HLD who presents to [facility] on 9/19/24 with acute onset gait difficulties, left side weakness, slurred speech, intermittent dizziness, and diplopia. LKN is 0700 am 9/18. 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 TTE showed normal LV function. Patient was not a candidate for TNK as he was outside of appropriate window, nor mechanical thrombectomy as there was no LVO. Aspirin and high dose statin was initiated.    Hospital course significant for elevated LFTs, CT C/A/P showed cholelithiasis, patient is recommended to f/u with outpatient GI as he is asymptomatic.     Patient optimized and was evaluated by PM&R and therapy for functional deficits, gait/ADL impairments and acute rehabilitation was recommended. Patient was cleared for discharge to [facility] IRF on 9/23/24. (23 Sep 2024 15:20)      PAST MEDICAL & SURGICAL HISTORY:  Hypertriglyceridemia      HTN (hypertension)      Fracture  olecrenon process      S/P inguinal hernia repair  bilateral          MEDICATIONS  (STANDING):  aspirin  chewable 81 milliGRAM(s) Oral daily  atorvastatin 40 milliGRAM(s) Oral at bedtime  bacitracin   Ointment 1 Application(s) Topical daily  enoxaparin Injectable 40 milliGRAM(s) SubCutaneous every 24 hours  losartan 100 milliGRAM(s) Oral daily  pantoprazole    Tablet 40 milliGRAM(s) Oral before breakfast  polyethylene glycol 3350 17 Gram(s) Oral daily  senna 2 Tablet(s) Oral at bedtime    MEDICATIONS  (PRN):  acetaminophen     Tablet .. 650 milliGRAM(s) Oral every 6 hours PRN Mild Pain (1 - 3)  melatonin 6 milliGRAM(s) Oral at bedtime PRN Insomnia      Allergies    grass (Sneezing)  No Known Drug Allergies  dust (Sneezing)    Intolerances          VITALS  69y  Vital Signs Last 24 Hrs  T(C): 36.3 (03 Oct 2024 08:08), Max: 36.6 (02 Oct 2024 19:52)  T(F): 97.4 (03 Oct 2024 08:08), Max: 97.8 (02 Oct 2024 19:52)  HR: 76 (03 Oct 2024 08:08) (58 - 76)  BP: 113/73 (03 Oct 2024 08:08) (112/71 - 121/73)  BP(mean): --  RR: 16 (03 Oct 2024 08:08) (16 - 16)  SpO2: 96% (03 Oct 2024 08:08) (93% - 96%)    Parameters below as of 03 Oct 2024 08:08  Patient On (Oxygen Delivery Method): room air      Daily     Daily         RECENT LABS:                          14.9   6.92  )-----------( 268      ( 03 Oct 2024 06:48 )             46.0     10-03    145  |  107  |  18  ----------------------------<  121[H]  4.3   |  27  |  1.17    Ca    8.9      03 Oct 2024 06:48    TPro  7.6  /  Alb  3.9  /  TBili  0.8  /  DBili  x   /  AST  47[H]  /  ALT  102[H]  /  AlkPhos  96  10-03    LIVER FUNCTIONS - ( 03 Oct 2024 06:48 )  Alb: 3.9 g/dL / Pro: 7.6 g/dL / ALK PHOS: 96 U/L / ALT: 102 U/L / AST: 47 U/L / GGT: x             Urinalysis Basic - ( 03 Oct 2024 06:48 )    Color: x / Appearance: x / SG: x / pH: x  Gluc: 121 mg/dL / Ketone: x  / Bili: x / Urobili: x   Blood: x / Protein: x / Nitrite: x   Leuk Esterase: x / RBC: x / WBC x   Sq Epi: x / Non Sq Epi: x / Bacteria: x          CAPILLARY BLOOD GLUCOSE                   Patient is a 69y old  Male who presents with a chief complaint of R pontine and L callosal infarct (02 Oct 2024 13:10)      HPI:  68 y/o male RH-dominant, with a PMH of HTN, HLD who presents to [facility] on 9/19/24 with acute onset gait difficulties, left side weakness, slurred speech, intermittent dizziness, and diplopia. LKN is 0700 am 9/18. 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 TTE showed normal LV function. Patient was not a candidate for TNK as he was outside of appropriate window, nor mechanical thrombectomy as there was no LVO. Aspirin and high dose statin was initiated.    Hospital course significant for elevated LFTs, CT C/A/P showed cholelithiasis, patient is recommended to f/u with outpatient GI as he is asymptomatic.     Patient optimized and was evaluated by PM&R and therapy for functional deficits, gait/ADL impairments and acute rehabilitation was recommended. Patient was cleared for discharge to [facility] IRF on 9/23/24. (23 Sep 2024 15:20)      Patient seen and examined at bedside today. he had no new complaints and no overnight events. some loose stools so will make bowel reg prn only    PAST MEDICAL & SURGICAL HISTORY:  Hypertriglyceridemia      HTN (hypertension)      Fracture  olecrenon process      S/P inguinal hernia repair  bilateral          MEDICATIONS  (STANDING):  aspirin  chewable 81 milliGRAM(s) Oral daily  atorvastatin 40 milliGRAM(s) Oral at bedtime  bacitracin   Ointment 1 Application(s) Topical daily  enoxaparin Injectable 40 milliGRAM(s) SubCutaneous every 24 hours  losartan 100 milliGRAM(s) Oral daily  pantoprazole    Tablet 40 milliGRAM(s) Oral before breakfast  polyethylene glycol 3350 17 Gram(s) Oral daily  senna 2 Tablet(s) Oral at bedtime    MEDICATIONS  (PRN):  acetaminophen     Tablet .. 650 milliGRAM(s) Oral every 6 hours PRN Mild Pain (1 - 3)  melatonin 6 milliGRAM(s) Oral at bedtime PRN Insomnia      Allergies    grass (Sneezing)  No Known Drug Allergies  dust (Sneezing)    Intolerances          VITALS  69y  Vital Signs Last 24 Hrs  T(C): 36.3 (03 Oct 2024 08:08), Max: 36.6 (02 Oct 2024 19:52)  T(F): 97.4 (03 Oct 2024 08:08), Max: 97.8 (02 Oct 2024 19:52)  HR: 76 (03 Oct 2024 08:08) (58 - 76)  BP: 113/73 (03 Oct 2024 08:08) (112/71 - 121/73)  BP(mean): --  RR: 16 (03 Oct 2024 08:08) (16 - 16)  SpO2: 96% (03 Oct 2024 08:08) (93% - 96%)    Parameters below as of 03 Oct 2024 08:08  Patient On (Oxygen Delivery Method): room air      Daily     Daily         RECENT LABS:                          14.9   6.92  )-----------( 268      ( 03 Oct 2024 06:48 )             46.0     10-03    145  |  107  |  18  ----------------------------<  121[H]  4.3   |  27  |  1.17    Ca    8.9      03 Oct 2024 06:48    TPro  7.6  /  Alb  3.9  /  TBili  0.8  /  DBili  x   /  AST  47[H]  /  ALT  102[H]  /  AlkPhos  96  10-03    LIVER FUNCTIONS - ( 03 Oct 2024 06:48 )  Alb: 3.9 g/dL / Pro: 7.6 g/dL / ALK PHOS: 96 U/L / ALT: 102 U/L / AST: 47 U/L / GGT: x             Urinalysis Basic - ( 03 Oct 2024 06:48 )    Color: x / Appearance: x / SG: x / pH: x  Gluc: 121 mg/dL / Ketone: x  / Bili: x / Urobili: x   Blood: x / Protein: x / Nitrite: x   Leuk Esterase: x / RBC: x / WBC x   Sq Epi: x / Non Sq Epi: x / Bacteria: x          CAPILLARY BLOOD GLUCOSE

## 2024-10-03 NOTE — PROGRESS NOTE ADULT - ATTENDING COMMENTS
Progress note amended to include my discussions with patient, resident, hospitalist, RN, SW, PT and my findings    Patient seen in OT. He is performing left UE reaching and coordination exercises; smiling, but then later says that it's "weak" and "not like this" [right hand]. Left shoulder abduction 3+/5, to 100/105 degrees. elbow flexion 3+-4-/5. able to have improved coordination and control finger tapping left hand, although some difficulty reaching pinky. Non painful. We discussed his continued progress, as well as plans for continued therapy on dc; and timeframe/sig progress relatively short time and compared to admission. Overall, his frustration and mood has been improving during his stay. OT hours have been increased to increase focus on left UE return and function.    Labs and vitals reviewed, stable. Continue program    RECENT LABS    Vital Signs Last 24 Hrs  T(C): 36.3 (03 Oct 2024 08:08), Max: 36.6 (02 Oct 2024 19:52)  T(F): 97.4 (03 Oct 2024 08:08), Max: 97.8 (02 Oct 2024 19:52)  HR: 76 (03 Oct 2024 08:08) (58 - 76)  BP: 113/73 (03 Oct 2024 08:08) (112/71 - 121/73)  BP(mean): --  RR: 16 (03 Oct 2024 08:08) (16 - 16)  SpO2: 96% (03 Oct 2024 08:08) (93% - 96%)    Parameters below as of 03 Oct 2024 08:08  Patient On (Oxygen Delivery Method): room air                              14.9   6.92  )-----------( 268      ( 03 Oct 2024 06:48 )             46.0     10-03    145  |  107  |  18  ----------------------------<  121[H]  4.3   |  27  |  1.17    Ca    8.9      03 Oct 2024 06:48    TPro  7.6  /  Alb  3.9  /  TBili  0.8  /  DBili  x   /  AST  47[H]  /  ALT  102[H]  /  AlkPhos  96  10-03      Urinalysis Basic - ( 03 Oct 2024 06:48 )    Color: x / Appearance: x / SG: x / pH: x  Gluc: 121 mg/dL / Ketone: x  / Bili: x / Urobili: x   Blood: x / Protein: x / Nitrite: x   Leuk Esterase: x / RBC: x / WBC x   Sq Epi: x / Non Sq Epi: x / Bacteria: x      CAPILLARY BLOOD GLUCOSE

## 2024-10-03 NOTE — PROGRESS NOTE ADULT - ASSESSMENT
70 y/o male RHD with a PMH of HTN, HLD who presents to [facility] on 9/19/24 with acute onset gait difficulties, left side weakness, slurred speech, intermittent dizziness, and diplopia secondary to R pontine and L callosal infarct. Hospital course significant for elevated LFTs/ cholelithiasis (to be managed as outpt).    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  - TTE (9/20)- normal LV function  - S/P ILR for afib surveillance  - Aspirin 81mg daily  - Atorvastatin dose will be decreased to 40mg HS due to increasing LFT's    # Transaminitis  - with h/o cholelithiasis. Asymptomatic  - decrease the dose of Lipitor to 40 mg hs and monitor    # HTN  - Losartan 100mg daily    # HLD  - Atorvastatin 40mg HS    # cholelithiasis  # Elevated LFTs  - CT C/A/P (9/23)- Cholelithiasis with nonspecific thickening of the gallbladder wall. Correlate with LFTs.  - avoid hepatotoxic meds  -elevated LFT'a but clinically pt is asymptomatic. No c/o abd pain, N/V  - outpatient GI follow up    DVT ppx- Lovenox

## 2024-10-03 NOTE — PROGRESS NOTE ADULT - SUBJECTIVE AND OBJECTIVE BOX
PROGRESS NOTE:     Patient is a 69y old  Male who presents with a chief complaint of R pontine and L callosal infarct (03 Oct 2024 09:12)      SUBJECTIVE / OVERNIGHT EVENTS: pt was seen and evaluated this morning  no complains offered     ADDITIONAL REVIEW OF SYSTEMS:    MEDICATIONS  (STANDING):  aspirin  chewable 81 milliGRAM(s) Oral daily  atorvastatin 40 milliGRAM(s) Oral at bedtime  bacitracin   Ointment 1 Application(s) Topical daily  enoxaparin Injectable 40 milliGRAM(s) SubCutaneous every 24 hours  losartan 100 milliGRAM(s) Oral daily  pantoprazole    Tablet 40 milliGRAM(s) Oral before breakfast    MEDICATIONS  (PRN):  acetaminophen     Tablet .. 650 milliGRAM(s) Oral every 6 hours PRN Mild Pain (1 - 3)  melatonin 6 milliGRAM(s) Oral at bedtime PRN Insomnia  polyethylene glycol 3350 17 Gram(s) Oral daily PRN Constipation  senna 2 Tablet(s) Oral at bedtime PRN Constipation      CAPILLARY BLOOD GLUCOSE        I&O's Summary      PHYSICAL EXAM:  Vital Signs Last 24 Hrs  T(C): 36.3 (03 Oct 2024 08:08), Max: 36.6 (02 Oct 2024 19:52)  T(F): 97.4 (03 Oct 2024 08:08), Max: 97.8 (02 Oct 2024 19:52)  HR: 76 (03 Oct 2024 08:08) (58 - 76)  BP: 113/73 (03 Oct 2024 08:08) (112/71 - 121/73)  BP(mean): --  RR: 16 (03 Oct 2024 08:08) (16 - 16)  SpO2: 96% (03 Oct 2024 08:08) (93% - 96%)    Parameters below as of 03 Oct 2024 08:08  Patient On (Oxygen Delivery Method): room air    PHYSICAL EXAM:  General: NAD, A/O   ENT: MMM, no scleral icterus  Neck: Supple, No JVD, no thyroidomegaly  Lungs: Clear to auscultation bilaterally, no wheezes, no rales, no rhonchi, good inspiratory effort  Cardio: RRR, S1/S2, No murmurs  Abdomen: Soft, Nontender, Nondistended; Bowel sounds present  Extremities: No calf tenderness, No pitting edema, no skin changes        LABS:                        14.9   6.92  )-----------( 268      ( 03 Oct 2024 06:48 )             46.0     10-03    145  |  107  |  18  ----------------------------<  121[H]  4.3   |  27  |  1.17    Ca    8.9      03 Oct 2024 06:48    TPro  7.6  /  Alb  3.9  /  TBili  0.8  /  DBili  x   /  AST  47[H]  /  ALT  102[H]  /  AlkPhos  96  10-03          Urinalysis Basic - ( 03 Oct 2024 06:48 )    Color: x / Appearance: x / SG: x / pH: x  Gluc: 121 mg/dL / Ketone: x  / Bili: x / Urobili: x   Blood: x / Protein: x / Nitrite: x   Leuk Esterase: x / RBC: x / WBC x   Sq Epi: x / Non Sq Epi: x / Bacteria: x        labs reviewed 10/3  discussed during IDR

## 2024-10-04 PROCEDURE — 99232 SBSQ HOSP IP/OBS MODERATE 35: CPT

## 2024-10-04 RX ADMIN — LOSARTAN POTASSIUM 100 MILLIGRAM(S): 100 TABLET, FILM COATED ORAL at 05:16

## 2024-10-04 RX ADMIN — BACITRACIN 1 APPLICATION(S): 500 OINTMENT TOPICAL at 12:35

## 2024-10-04 RX ADMIN — ENOXAPARIN SODIUM 40 MILLIGRAM(S): 150 INJECTION SUBCUTANEOUS at 12:34

## 2024-10-04 RX ADMIN — PANTOPRAZOLE SODIUM 40 MILLIGRAM(S): 40 TABLET, DELAYED RELEASE ORAL at 05:15

## 2024-10-04 RX ADMIN — ATORVASTATIN CALCIUM 40 MILLIGRAM(S): 10 TABLET, FILM COATED ORAL at 21:36

## 2024-10-04 RX ADMIN — Medication 81 MILLIGRAM(S): at 12:35

## 2024-10-04 NOTE — PROGRESS NOTE ADULT - SUBJECTIVE AND OBJECTIVE BOX
Patient is a 69y old  Male who presents with a chief complaint of R pontine and L callosal infarct (04 Oct 2024 09:18)      Patient seen and examined at bedside. denies headache, fever, chills, cp, sob, n/v, abd pain.    ALLERGIES:  grass (Sneezing)  No Known Drug Allergies  dust (Sneezing)    MEDICATIONS  (STANDING):  aspirin  chewable 81 milliGRAM(s) Oral daily  atorvastatin 40 milliGRAM(s) Oral at bedtime  bacitracin   Ointment 1 Application(s) Topical daily  enoxaparin Injectable 40 milliGRAM(s) SubCutaneous every 24 hours  losartan 100 milliGRAM(s) Oral daily  pantoprazole    Tablet 40 milliGRAM(s) Oral before breakfast    MEDICATIONS  (PRN):  acetaminophen     Tablet .. 650 milliGRAM(s) Oral every 6 hours PRN Mild Pain (1 - 3)  melatonin 6 milliGRAM(s) Oral at bedtime PRN Insomnia  polyethylene glycol 3350 17 Gram(s) Oral daily PRN Constipation  senna 2 Tablet(s) Oral at bedtime PRN Constipation    Vital Signs Last 24 Hrs  T(F): 98 (03 Oct 2024 20:18), Max: 98 (03 Oct 2024 20:18)  HR: 61 (04 Oct 2024 05:18) (61 - 71)  BP: 110/71 (04 Oct 2024 05:18) (110/71 - 114/65)  RR: 16 (04 Oct 2024 05:18) (16 - 16)  SpO2: 96% (04 Oct 2024 05:18) (96% - 96%)  I&O's Summary        PHYSICAL EXAM:  General: NAD, pleasant  ENT: MMM, no scleral icterus  Neck: Supple, No JVD  Lungs: Clear to auscultation bilaterally, no wheezes, rales, rhonchi  Cardio: RRR, S1/S2  Abdomen: Soft, Nontender, Nondistended; Bowel sounds present  Extremities: No calf tenderness, No pitting edema    LABS:                        14.9   6.92  )-----------( 268      ( 03 Oct 2024 06:48 )             46.0       10-03    145  |  107  |  18  ----------------------------<  121  4.3   |  27  |  1.17    Ca    8.9      03 Oct 2024 06:48    TPro  7.6  /  Alb  3.9  /  TBili  0.8  /  DBili  x   /  AST  47  /  ALT  102  /  AlkPhos  96  10-03                09-20 Chol 175 mg/dL LDL -- HDL 33 mg/dL Trig 240 mg/dL                  Urinalysis Basic - ( 03 Oct 2024 06:48 )    Color: x / Appearance: x / SG: x / pH: x  Gluc: 121 mg/dL / Ketone: x  / Bili: x / Urobili: x   Blood: x / Protein: x / Nitrite: x   Leuk Esterase: x / RBC: x / WBC x   Sq Epi: x / Non Sq Epi: x / Bacteria: x        COVID-19 PCR: NotDetec (09-23-24 @ 19:52)      RADIOLOGY & ADDITIONAL TESTS:    Care Discussed with Consultants/Other Providers: yes, rehab

## 2024-10-04 NOTE — PROGRESS NOTE ADULT - ASSESSMENT
MICA LE is a 70 y/o male RHD with a PMH of HTN, HLD who presents to [facility] on 24 with acute onset gait difficulties, left side weakness, slurred speech, intermittent dizziness, and diplopia secondary to R pontine and L callosal infarct. Hospital course significant for elevated LFTs/ cholelithiasis (to be managed as outpt).    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  - MR Head  ()- : Acute/subacute infarction within the right side of the elba as well as left lateral genu of the corpus callosum with associated cytotoxic edema and without associated hemorrhage.   - S/P ILR for afib surveillance. DSD daily  - Aspirin 81mg daily  - Atorvastatin 40mg HS- LFTs improving with reduction  - continue comprehensive rehab program, 3 hours a day, 5 days a week. OT PT SLP  - recreation therapy and psychology consult  - Precautions: cardiac, fall, vision, ILR    # HTN  - Losartan 100mg daily  - BP (110/71 - 121/73) 10/4    # HLD  - Decreased to Atorvastatin 40mg HS     # cholelithiasis  # transaminitis   - CT C/A/P ()- Cholelithiasis with nonspecific thickening of the gallbladder wall. Correlate with LFTs.  - AST  104[H]  /  ALT  177[H]  /  AlkPhos  102  . ->  AST  47[H]  /  ALT  102[H]  /  AlkPhos  96  10-03 improved  - avoid hepatotoxic meds  - decreased atorvastatin  - DC tylenol.   - CMP 10/7  - outpatient GI follow up    # Sleep:   - Melatonin 6mg PRN    # Pain Management:  - Tylenol stopped    # GI/Bowel:  - Senna QHS, Miralax PRN Daily  - GI ppx: Pantoprazole 40mg    # Skin/Pressure Injury:   - Skin assessment on admission: loop recorder site c/d/i  - Itching scalp, resistant dandruff. s/p Ketoconazole shampoo x 3    # Diet  - Regular- DASH    #  DVT ppx:  -  Lovenox 40mg daily    # Case discussed in IDT rounds 10/2 (follow up)  - skin intact, continent B/B, eating and grooming set up, CG toileting, bathing min assist, UB supervision, LB CG, toilet and shower transfers CG-min assist, independent bed mobility, CG transfers, ambulation 115 feet QC and CG, 15 steps with 1 HR and min assist. Would recommend NBQC on dc  - barriers: LUE weakness, visual perceptual deficits, reduced balance, coordination, dizziness  - goals: ambulate to commode with supervision (progressing), perform toileting tasks with set up (progressing)  - target: supervision with iADLs, set up toileting and dressing, supervision transfers, supervision ambulation. 10/10 dc home [facility] caregiver training     # LABS   CBC CMP 10/7    ---------------  Code Status: FULL  Emergency Contact: SON: EDILMA [phone]    PCP Dr Lynn: [phone]    Outpatient Follow-up (Specialty/Name of physician):    Libman, Richard Benjamin  Neurology  [address]  [place] [address]  Phone: [phone]  Fax: [phone]  Follow Up Time: 2 weeks    dEin Neves  Cardiology  [address], [address]  [place] [address]  Phone: [phone]  Fax: [phone]  Follow Up Time: 2 weeks    Felice Hazel  Cardiac Electrophysiology  [address] [place] [address], [id number]  [place] [address]  Phone: [phone]  Fax: [phone]  Follow Up Time: 2 weeks    Felice Herrera  Gastroenterology  [address] [place] [address]  Phone: [phone]  Fax: [phone]  Follow Up Time: 2 weeks    Primary Care Dr. Lynn  [phone]  Send all DC paper work to Dr. Lynn MICA LE is a 68 y/o male RHD with a PMH of HTN, HLD who presents to [facility] on 9/19/24 with acute onset gait difficulties, left side weakness, slurred speech, intermittent dizziness, and diplopia secondary to R pontine and L callosal infarct. Hospital course significant for elevated LFTs/ cholelithiasis (to be managed as outpt).    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   - S/P ILR for afib surveillance. DSD daily  - Aspirin 81mg daily  - Atorvastatin 40mg HS- LFTs improving with reduction  - continue comprehensive rehab program, 3 hours a day, 5 days a week. OT PT SLP  - recreation therapy and psychology consult  - Precautions: cardiac, fall, vision, ILR    # HTN  - Losartan 100mg daily  - BP (110/71 - 121/73) 10/4    # HLD  - Decreased to Atorvastatin 40mg HS 9/26    # cholelithiasis  # transaminitis   - CT C/A/P (9/23)- Cholelithiasis with nonspecific thickening of the gallbladder wall. Correlate with LFTs.  - AST  104[H]  /  ALT  177[H]  /  AlkPhos  102  09-26. ->  AST  47[H]  /  ALT  102[H]  /  AlkPhos  96  10-03 improved  - avoid hepatotoxic meds  - decreased atorvastatin  - DC tylenol.   - CMP 10/7  - outpatient GI follow up    # Sleep:   - Melatonin 6mg PRN    # Pain Management:  - Tylenol stopped    # GI/Bowel:  - Senna QHS, Miralax PRN Daily  - GI ppx: Pantoprazole 40mg    # Skin/Pressure Injury:   - Skin assessment on admission: loop recorder site c/d/i  - Itching scalp, resistant dandruff. s/p Ketoconazole shampoo x 3. Patient reports today that dandruff and itching is still present although feels much better and asks for another shampoo. MEdication involved discussed with patient; family states he usually uses shampoo at home for problem but not as effective. Informed we would do one more dose only    # Diet  - Regular- DASH    #  DVT ppx:  -  Lovenox 40mg daily    # Case discussed in IDT rounds 10/2 (follow up)  - skin intact, continent B/B, eating and grooming set up, CG toileting, bathing min assist, UB supervision, LB CG, toilet and shower transfers CG-min assist, independent bed mobility, CG transfers, ambulation 115 feet QC and CG, 15 steps with 1 HR and min assist. Would recommend NBQC on dc  - barriers: LUE weakness, visual perceptual deficits, reduced balance, coordination, dizziness  - goals: ambulate to commode with supervision (progressing), perform toileting tasks with set up (progressing)  - target: supervision with iADLs, set up toileting and dressing, supervision transfers, supervision ambulation. 10/10 dc home [facility] caregiver training     # LABS   CBC CMP 10/7    ---------------  Code Status: FULL  Emergency Contact: SON: EDILMA [phone]    PCP Dr Marcela Lynn [phone] (office) and [phone]  [address] [place] [address] Erwin Gaona    Outpatient Follow-up (Specialty/Name of physician):    Libman, Richard Benjamin  Neurology  [address]  [place] [address]  Phone: [phone]  Fax: [phone]  Follow Up Time: 2 weeks    Edin Neves  Cardiology  [address], [address]  [place] [address]  Phone: [phone]  Fax: [phone]  Follow Up Time: 2 weeks    Felice Hazel  Cardiac Electrophysiology  [address] [place] [address], [id number]  [place] [address]  Phone: [phone]  Fax: [phone]  Follow Up Time: 2 weeks    Felice Herrera  Gastroenterology  [address] [place] [address]  Phone: [phone]  Fax: [phone]  Follow Up Time: 2 weeks    Primary Care Dr. Lynn  [phone]  Send all DC paper work to Dr. Lynn

## 2024-10-04 NOTE — PROGRESS NOTE ADULT - ASSESSMENT
70 y/o M with PMH of HTN, HLD who presents to [facility] on 9/19/24 with acute onset gait difficulties, left side weakness, slurred speech, intermittent dizziness, and diplopia secondary to R pontine and L callosal infarct. Hospital course significant for elevated LFTs/ cholelithiasis (to be managed as outpt).    #R pontine and L callosal infarct with left side weakness, slurred speech, intermittent dizziness, and diplopia.  -TTE (9/20)- normal LV function  -S/P ILR for afib surveillance  -Aspirin 81mg daily  -Atorvastatin dose will be decreased to 40mg HS due to increasing LFT's  -Continue comprehensive rehab program - PT/OT/SLP per rehab team  -Pain management, bowel regimen per rehab     #Transaminitis  -H/o cholelithiasis. Asymptomatic  -Continue lipitor 40mg qhs  -CT C/A/P (9/23)- Cholelithiasis with nonspecific thickening of the gallbladder wall. Correlate with LFTs.  -Avoid hepatotoxic meds  -Outpatient GI follow up    #HTN  -Continue Losartan 100mg daily    #HLD  -Continue Atorvastatin 40mg qhs    DVT ppx - Lovenox  GI ppx - PPI

## 2024-10-04 NOTE — PROGRESS NOTE ADULT - ATTENDING COMMENTS
Progress note amended to include my discussions with patient, resident, hospitalist, RN, SW, PT and my findings    Patient and wife present. Seen with assistance language line  in Mandarin  #[id number]    Patient is doing well. He chooses at times to answer in English or not wait for translation. Sleeping well, denies any H.A. Recent improvement in LFTs on lower atorvastatin dose discussed which he is happy about. He still reports some itching scalp, and dandruff--states it is much better, but asks for another nizoral shampoo. Medication involved discussed; he does use antidandruff shampoo at home but states is not as effective, wife agrees. Will give last dose, discussed with both    BP, HR controlled. Patient continues to make gains in left UE coordination, AROm, strength including hand function, able to gross grasp pen 3/5 and but not using first and second fingers. [place] program, on track for dc next week. Family also asking about med records, info for PCP listed above, SW to give HIPPA form for completion    RECENT LABS    Vital Signs Last 24 Hrs  T(C): 36.7 (03 Oct 2024 20:18), Max: 36.7 (03 Oct 2024 20:18)  T(F): 98 (03 Oct 2024 20:18), Max: 98 (03 Oct 2024 20:18)  HR: 81 (04 Oct 2024 15:00) (61 - 81)  BP: 111/76 (04 Oct 2024 15:00) (110/71 - 114/65)  BP(mean): --  RR: 16 (04 Oct 2024 15:00) (16 - 16)  SpO2: 98% (04 Oct 2024 15:00) (96% - 98%)    Parameters below as of 04 Oct 2024 05:18  Patient On (Oxygen Delivery Method): room air                              14.9   6.92  )-----------( 268      ( 03 Oct 2024 06:48 )             46.0     10-03    145  |  107  |  18  ----------------------------<  121[H]  4.3   |  27  |  1.17    Ca    8.9      03 Oct 2024 06:48    TPro  7.6  /  Alb  3.9  /  TBili  0.8  /  DBili  x   /  AST  47[H]  /  ALT  102[H]  /  AlkPhos  96  10-03      Urinalysis Basic - ( 03 Oct 2024 06:48 )    Color: x / Appearance: x / SG: x / pH: x  Gluc: 121 mg/dL / Ketone: x  / Bili: x / Urobili: x   Blood: x / Protein: x / Nitrite: x   Leuk Esterase: x / RBC: x / WBC x   Sq Epi: x / Non Sq Epi: x / Bacteria: x      CAPILLARY BLOOD GLUCOSE

## 2024-10-04 NOTE — PROGRESS NOTE ADULT - SUBJECTIVE AND OBJECTIVE BOX
Patient is a 69y old  Male who presents with a chief complaint of R pontine and L callosal infarct (03 Oct 2024 14:27)    HPI:  68 y/o male RH-dominant, with a PMH of HTN, HLD who presents to [facility] on 9/19/24 with acute onset gait difficulties, left side weakness, slurred speech, intermittent dizziness, and diplopia. LKN is 0700 am 9/18. 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 TTE showed normal LV function. Patient was not a candidate for TNK as he was outside of appropriate window, nor mechanical thrombectomy as there was no LVO. Aspirin and high dose statin was initiated.    Hospital course significant for elevated LFTs, CT C/A/P showed cholelithiasis, patient is recommended to f/u with outpatient GI as he is asymptomatic.     Patient optimized and was evaluated by PM&R and therapy for functional deficits, gait/ADL impairments and acute rehabilitation was recommended. Patient was cleared for discharge to [facility] IRF on 9/23/24. (23 Sep 2024 15:20)      SUBJECTIVE  Interval History:  Patient seen and examined at bedside this morning. He has no new complaints, no acute events over night. Had OT session this morning that went well. Wife again asks that all discharge papers be sent to patient family med doctor.     Allergies    grass (Sneezing)  No Known Drug Allergies  dust (Sneezing)    Intolerances        OBJECTIVE    69y  Vital Signs Last 24 Hrs  T(C): 36.7 (03 Oct 2024 20:18), Max: 36.7 (03 Oct 2024 20:18)  T(F): 98 (03 Oct 2024 20:18), Max: 98 (03 Oct 2024 20:18)  HR: 61 (04 Oct 2024 05:18) (61 - 71)  BP: 110/71 (04 Oct 2024 05:18) (110/71 - 114/65)  BP(mean): --  RR: 16 (04 Oct 2024 05:18) (16 - 16)  SpO2: 96% (04 Oct 2024 05:18) (96% - 96%)    Parameters below as of 04 Oct 2024 05:18  Patient On (Oxygen Delivery Method): room air      Daily     Daily       MEDICATIONS  (STANDING):  aspirin  chewable 81 milliGRAM(s) Oral daily  atorvastatin 40 milliGRAM(s) Oral at bedtime  bacitracin   Ointment 1 Application(s) Topical daily  enoxaparin Injectable 40 milliGRAM(s) SubCutaneous every 24 hours  losartan 100 milliGRAM(s) Oral daily  pantoprazole    Tablet 40 milliGRAM(s) Oral before breakfast    MEDICATIONS  (PRN):  acetaminophen     Tablet .. 650 milliGRAM(s) Oral every 6 hours PRN Mild Pain (1 - 3)  melatonin 6 milliGRAM(s) Oral at bedtime PRN Insomnia  polyethylene glycol 3350 17 Gram(s) Oral daily PRN Constipation  senna 2 Tablet(s) Oral at bedtime PRN Constipation      RECENT LABS:                          14.9   6.92  )-----------( 268      ( 03 Oct 2024 06:48 )             46.0     10-03    145  |  107  |  18  ----------------------------<  121[H]  4.3   |  27  |  1.17    Ca    8.9      03 Oct 2024 06:48    TPro  7.6  /  Alb  3.9  /  TBili  0.8  /  DBili  x   /  AST  47[H]  /  ALT  102[H]  /  AlkPhos  96  10-03    LIVER FUNCTIONS - ( 03 Oct 2024 06:48 )  Alb: 3.9 g/dL / Pro: 7.6 g/dL / ALK PHOS: 96 U/L / ALT: 102 U/L / AST: 47 U/L / GGT: x             Urinalysis Basic - ( 03 Oct 2024 06:48 )    Color: x / Appearance: x / SG: x / pH: x  Gluc: 121 mg/dL / Ketone: x  / Bili: x / Urobili: x   Blood: x / Protein: x / Nitrite: x   Leuk Esterase: x / RBC: x / WBC x   Sq Epi: x / Non Sq Epi: x / Bacteria: x        CAPILLARY BLOOD GLUCOSE               Patient is a 69y old  Male who presents with a chief complaint of R pontine and L callosal infarct (03 Oct 2024 14:27)    HPI:  68 y/o male RH-dominant, with a PMH of HTN, HLD who presents to [facility] on 9/19/24 with acute onset gait difficulties, left side weakness, slurred speech, intermittent dizziness, and diplopia. LKN is 0700 am 9/18. 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 TTE showed normal LV function. Patient was not a candidate for TNK as he was outside of appropriate window, nor mechanical thrombectomy as there was no LVO. Aspirin and high dose statin was initiated.    Hospital course significant for elevated LFTs, CT C/A/P showed cholelithiasis, patient is recommended to f/u with outpatient GI as he is asymptomatic.     Patient optimized and was evaluated by PM&R and therapy for functional deficits, gait/ADL impairments and acute rehabilitation was recommended. Patient was cleared for discharge to [facility] IRF on 9/23/24. (23 Sep 2024 15:20)      SUBJECTIVE  Interval History:  Patient seen and examined at bedside this morning. He has no new complaints, no acute events over night. Had OT session this morning that went well. Wife again asks that all discharge papers be sent to patient family med doctor.     Allergies    grass (Sneezing)  No Known Drug Allergies  dust (Sneezing)    Intolerances        OBJECTIVE    69y  Vital Signs Last 24 Hrs  T(C): 36.7 (03 Oct 2024 20:18), Max: 36.7 (03 Oct 2024 20:18)  T(F): 98 (03 Oct 2024 20:18), Max: 98 (03 Oct 2024 20:18)  HR: 61 (04 Oct 2024 05:18) (61 - 71)  BP: 110/71 (04 Oct 2024 05:18) (110/71 - 114/65)  BP(mean): --  RR: 16 (04 Oct 2024 05:18) (16 - 16)  SpO2: 96% (04 Oct 2024 05:18) (96% - 96%)    Parameters below as of 04 Oct 2024 05:18  Patient On (Oxygen Delivery Method): room air      Daily     Daily       MEDICATIONS  (STANDING):  aspirin  chewable 81 milliGRAM(s) Oral daily  atorvastatin 40 milliGRAM(s) Oral at bedtime  bacitracin   Ointment 1 Application(s) Topical daily  enoxaparin Injectable 40 milliGRAM(s) SubCutaneous every 24 hours  losartan 100 milliGRAM(s) Oral daily  pantoprazole    Tablet 40 milliGRAM(s) Oral before breakfast    MEDICATIONS  (PRN):  acetaminophen     Tablet .. 650 milliGRAM(s) Oral every 6 hours PRN Mild Pain (1 - 3)  melatonin 6 milliGRAM(s) Oral at bedtime PRN Insomnia  polyethylene glycol 3350 17 Gram(s) Oral daily PRN Constipation  senna 2 Tablet(s) Oral at bedtime PRN Constipation      RECENT LABS:                          14.9   6.92  )-----------( 268      ( 03 Oct 2024 06:48 )             46.0     10-03    145  |  107  |  18  ----------------------------<  121[H]  4.3   |  27  |  1.17    Ca    8.9      03 Oct 2024 06:48    TPro  7.6  /  Alb  3.9  /  TBili  0.8  /  DBili  x   /  AST  47[H]  /  ALT  102[H]  /  AlkPhos  96  10-03    LIVER FUNCTIONS - ( 03 Oct 2024 06:48 )  Alb: 3.9 g/dL / Pro: 7.6 g/dL / ALK PHOS: 96 U/L / ALT: 102 U/L / AST: 47 U/L / GGT: x             Urinalysis Basic - ( 03 Oct 2024 06:48 )    Color: x / Appearance: x / SG: x / pH: x  Gluc: 121 mg/dL / Ketone: x  / Bili: x / Urobili: x   Blood: x / Protein: x / Nitrite: x   Leuk Esterase: x / RBC: x / WBC x   Sq Epi: x / Non Sq Epi: x / Bacteria: x        CAPILLARY BLOOD GLUCOSE

## 2024-10-05 PROCEDURE — 99232 SBSQ HOSP IP/OBS MODERATE 35: CPT

## 2024-10-05 RX ADMIN — Medication 81 MILLIGRAM(S): at 12:57

## 2024-10-05 RX ADMIN — BACITRACIN 1 APPLICATION(S): 500 OINTMENT TOPICAL at 12:57

## 2024-10-05 RX ADMIN — LOSARTAN POTASSIUM 100 MILLIGRAM(S): 100 TABLET, FILM COATED ORAL at 06:10

## 2024-10-05 RX ADMIN — ENOXAPARIN SODIUM 40 MILLIGRAM(S): 150 INJECTION SUBCUTANEOUS at 12:56

## 2024-10-05 RX ADMIN — ATORVASTATIN CALCIUM 40 MILLIGRAM(S): 10 TABLET, FILM COATED ORAL at 21:33

## 2024-10-05 RX ADMIN — PANTOPRAZOLE SODIUM 40 MILLIGRAM(S): 40 TABLET, DELAYED RELEASE ORAL at 06:10

## 2024-10-05 NOTE — PROGRESS NOTE ADULT - SUBJECTIVE AND OBJECTIVE BOX
PROGRESS NOTE:     Patient is a 69y old  Male who presents with a chief complaint of R pontine and L callosal infarct (04 Oct 2024 09:30)          SUBJECTIVE & OBJECTIVE:   Pt seen and examined at bedside in AM    no overnight events.     REVIEW OF SYSTEMS: remaining ROS negative     PHYSICAL EXAM:  VITALS:  Vital Signs Last 24 Hrs  T(C): 36.3 (04 Oct 2024 20:51), Max: 36.9 (04 Oct 2024 18:30)  T(F): 97.4 (04 Oct 2024 20:51), Max: 98.4 (04 Oct 2024 18:30)  HR: 65 (05 Oct 2024 06:15) (62 - 81)  BP: 133/77 (05 Oct 2024 06:15) (109/64 - 133/77)  BP(mean): --  RR: 15 (04 Oct 2024 20:51) (15 - 16)  SpO2: 96% (04 Oct 2024 20:51) (96% - 98%)    Parameters below as of 04 Oct 2024 20:51  Patient On (Oxygen Delivery Method): room air          GENERAL: NAD,  no increased WOB  HEAD:  Atraumatic, Normocephalic  EYES: EOMI, conjunctiva and sclera clear  ENMT: Moist mucous membranes  NECK: Supple, No JVD  NERVOUS SYSTEM:  Alert   CHEST/LUNG: Clear to auscultation bilaterally; No rales, rhonchi, wheezing  HEART: Regular rate and rhythm  ABDOMEN: Soft, Nontender, Nondistended; Bowel sounds present  EXTREMITIES: No clubbing, cyanosis, calf tenderness or edema b/l      MEDICATIONS  (STANDING):  aspirin  chewable 81 milliGRAM(s) Oral daily  atorvastatin 40 milliGRAM(s) Oral at bedtime  bacitracin   Ointment 1 Application(s) Topical daily  enoxaparin Injectable 40 milliGRAM(s) SubCutaneous every 24 hours  ketoconazole 2% Shampoo 1 Application(s) Topical once  losartan 100 milliGRAM(s) Oral daily  pantoprazole    Tablet 40 milliGRAM(s) Oral before breakfast    MEDICATIONS  (PRN):  acetaminophen     Tablet .. 650 milliGRAM(s) Oral every 6 hours PRN Mild Pain (1 - 3)  melatonin 6 milliGRAM(s) Oral at bedtime PRN Insomnia  polyethylene glycol 3350 17 Gram(s) Oral daily PRN Constipation  senna 2 Tablet(s) Oral at bedtime PRN Constipation      Allergies    grass (Sneezing)  No Known Drug Allergies  dust (Sneezing)    Intolerances        LABS:                        14.9   6.92  )-----------( 268      ( 03 Oct 2024 06:48 )             46.0       10-03    145  |  107  |  18  ----------------------------<  121  4.3   |  27  |  1.17    Ca    8.9      03 Oct 2024 06:48    TPro  7.6  /  Alb  3.9  /  TBili  0.8  /  DBili  x   /  AST  47  /  ALT  102  /  AlkPhos  96  10-03    09-20 Chol 175 mg/dL LDL -- HDL 33 mg/dL Trig 240 mg/dL    Urinalysis Basic - ( 03 Oct 2024 06:48 )    Color: x / Appearance: x / SG: x / pH: x  Gluc: 121 mg/dL / Ketone: x  / Bili: x / Urobili: x   Blood: x / Protein: x / Nitrite: x   Leuk Esterase: x / RBC: x / WBC x   Sq Epi: x / Non Sq Epi: x / Bacteria: x        COVID-19 PCR: NotDetec (09-23-24 @ 19:52)      RADIOLOGY & ADDITIONAL TESTS:    Care Discussed with Consultants/Other Providers: yes, rehab

## 2024-10-05 NOTE — PROGRESS NOTE ADULT - ASSESSMENT
70 yo M  admitted to acute Rehabilitation with right pontine and left lateral genu infarcts    Pt. Stable  Active Issues    CVA-- cont. ASA and Lipitor    HTN-- Cont. Losartan  --BP controlled    GI--   Constipation-- Miralax PRN and/or Senna PRN    DVT ppx: Lovenox     Cont. comprehensive inpatient PT, OT and Speech    No acute issues,   Cont. current plan of care and medications as per primary team

## 2024-10-05 NOTE — PROGRESS NOTE ADULT - ASSESSMENT
70 y/o M with PMH of HTN, HLD who presents to [facility] on 9/19/24 with acute onset gait difficulties, left side weakness, slurred speech, intermittent dizziness, and diplopia secondary to R pontine and L callosal infarct. Hospital course significant for elevated LFTs/ cholelithiasis (to be managed as outpt).    #R pontine and L callosal infarct with left side weakness, slurred speech, intermittent dizziness, and diplopia.  -TTE (9/20)- normal LV function  -S/P ILR for afib surveillance  -Aspirin 81mg daily  -Atorvastatin dose decreased to 40mg HS due to increasing LFT's  -Continue comprehensive rehab program - PT/OT/SLP per rehab team  -Pain management, bowel regimen per rehab     #Transaminitis  -H/o cholelithiasis. Asymptomatic  -Continue lipitor 40mg qhs  -CT C/A/P (9/23)- Cholelithiasis with nonspecific thickening of the gallbladder wall  -Avoid hepatotoxic meds  -Outpatient GI follow up    #HTN  -Continue Losartan 100mg daily    #HLD  -Continue Atorvastatin 40mg qhs    DVT ppx - Lovenox  GI ppx - PPI

## 2024-10-05 NOTE — PROGRESS NOTE ADULT - SUBJECTIVE AND OBJECTIVE BOX
Subjective: Seen this AM.  pt. did not report any new issues to me but reported constipation with nursing.   Nursing will give pt. his PRN miralax today.  Senna was added yesterday.      VITALS  T(C): 36.3 (10-04-24 @ 20:51), Max: 36.9 (10-04-24 @ 18:30)  HR: 65 (10-05-24 @ 06:15) (62 - 81)  BP: 133/77 (10-05-24 @ 06:15) (109/64 - 133/77)  RR: 15 (10-04-24 @ 20:51) (15 - 16)  SpO2: 96% (10-04-24 @ 20:51) (96% - 98%)  Wt(kg): --    REVIEW OF SYSTEMS  Pertinent in last 24 h: Neurological deficits      Physical Exam:  Constitutional - NAD, Comfortable  HEENT - NCAT, EOMI  Chest - breathing comfortably on RA  Cardiovascular -   Warm well perfused  Abdomen -  Soft, NTND  Extremities - No edema, No calf tenderness   Neurologic Exam -    Stable                Cognitive - Awake, Alert, AAO x3     Cranial Nerves - dysarthria     Motor - no new deficit     Psychiatric - Mood stable, Affect WNL  -    RECENT LABS/IMAGING                  MEDICATIONS   acetaminophen     Tablet .. 650 milliGRAM(s) every 6 hours PRN  aspirin  chewable 81 milliGRAM(s) daily  atorvastatin 40 milliGRAM(s) at bedtime  bacitracin   Ointment 1 Application(s) daily  enoxaparin Injectable 40 milliGRAM(s) every 24 hours  ketoconazole 2% Shampoo 1 Application(s) once  losartan 100 milliGRAM(s) daily  melatonin 6 milliGRAM(s) at bedtime PRN  pantoprazole    Tablet 40 milliGRAM(s) before breakfast  polyethylene glycol 3350 17 Gram(s) daily PRN  senna 2 Tablet(s) at bedtime PRN      --------------------------------------------------------------------

## 2024-10-06 PROCEDURE — 99232 SBSQ HOSP IP/OBS MODERATE 35: CPT

## 2024-10-06 RX ADMIN — PANTOPRAZOLE SODIUM 40 MILLIGRAM(S): 40 TABLET, DELAYED RELEASE ORAL at 05:22

## 2024-10-06 RX ADMIN — ATORVASTATIN CALCIUM 40 MILLIGRAM(S): 10 TABLET, FILM COATED ORAL at 22:06

## 2024-10-06 RX ADMIN — Medication 81 MILLIGRAM(S): at 13:03

## 2024-10-06 RX ADMIN — LOSARTAN POTASSIUM 100 MILLIGRAM(S): 100 TABLET, FILM COATED ORAL at 05:22

## 2024-10-06 RX ADMIN — BACITRACIN 1 APPLICATION(S): 500 OINTMENT TOPICAL at 13:03

## 2024-10-06 RX ADMIN — ENOXAPARIN SODIUM 40 MILLIGRAM(S): 150 INJECTION SUBCUTANEOUS at 13:03

## 2024-10-06 NOTE — PROGRESS NOTE ADULT - SUBJECTIVE AND OBJECTIVE BOX
Subjective: Seen this AM.  No new complaints or overnight issues    VITALS  T(C): 36.4 (10-05-24 @ 20:26), Max: 36.9 (10-05-24 @ 17:42)  HR: 64 (10-06-24 @ 05:22) (64 - 74)  BP: 127/75 (10-06-24 @ 05:22) (104/60 - 127/75)  RR: 15 (10-05-24 @ 20:26) (15 - 15)  SpO2: 96% (10-05-24 @ 20:26) (96% - 97%)  Wt(kg): --    REVIEW OF SYSTEMS  Pertinent in last 24 h: Neurological deficits      Physical Exam:  Constitutional - NAD, Comfortable  HEENT - NCAT, EOMI  Chest - breathing comfortably on RA  Cardiovascular -   Warm well perfused  Abdomen -  Soft, NTND  Extremities - No edema, No calf tenderness   Neurologic Exam -    Stable                Cognitive - Awake, Alert, AAO x3     Cranial Nerves - dysarthria     Motor - no new deficit     Psychiatric - Mood stable, Affect WNL  -    RECENT LABS/IMAGING                  MEDICATIONS   acetaminophen     Tablet .. 650 milliGRAM(s) every 6 hours PRN  aspirin  chewable 81 milliGRAM(s) daily  atorvastatin 40 milliGRAM(s) at bedtime  bacitracin   Ointment 1 Application(s) daily  enoxaparin Injectable 40 milliGRAM(s) every 24 hours  ketoconazole 2% Shampoo 1 Application(s) once  losartan 100 milliGRAM(s) daily  melatonin 6 milliGRAM(s) at bedtime PRN  pantoprazole    Tablet 40 milliGRAM(s) before breakfast  polyethylene glycol 3350 17 Gram(s) daily PRN  senna 2 Tablet(s) at bedtime PRN      --------------------------------------------------------------------

## 2024-10-07 LAB
ALBUMIN SERPL ELPH-MCNC: 4.1 G/DL — SIGNIFICANT CHANGE UP (ref 3.3–5)
ALP SERPL-CCNC: 91 U/L — SIGNIFICANT CHANGE UP (ref 40–120)
ALT FLD-CCNC: 93 U/L — HIGH (ref 10–45)
ANION GAP SERPL CALC-SCNC: 9 MMOL/L — SIGNIFICANT CHANGE UP (ref 5–17)
AST SERPL-CCNC: 39 U/L — SIGNIFICANT CHANGE UP (ref 10–40)
BASOPHILS # BLD AUTO: 0.05 K/UL — SIGNIFICANT CHANGE UP (ref 0–0.2)
BASOPHILS NFR BLD AUTO: 0.8 % — SIGNIFICANT CHANGE UP (ref 0–2)
BILIRUB SERPL-MCNC: 0.6 MG/DL — SIGNIFICANT CHANGE UP (ref 0.2–1.2)
BUN SERPL-MCNC: 16 MG/DL — SIGNIFICANT CHANGE UP (ref 7–23)
CALCIUM SERPL-MCNC: 9.3 MG/DL — SIGNIFICANT CHANGE UP (ref 8.4–10.5)
CHLORIDE SERPL-SCNC: 103 MMOL/L — SIGNIFICANT CHANGE UP (ref 96–108)
CO2 SERPL-SCNC: 29 MMOL/L — SIGNIFICANT CHANGE UP (ref 22–31)
CREAT SERPL-MCNC: 0.94 MG/DL — SIGNIFICANT CHANGE UP (ref 0.5–1.3)
EGFR: 86 ML/MIN/1.73M2 — SIGNIFICANT CHANGE UP
EOSINOPHIL # BLD AUTO: 0.09 K/UL — SIGNIFICANT CHANGE UP (ref 0–0.5)
EOSINOPHIL NFR BLD AUTO: 1.5 % — SIGNIFICANT CHANGE UP (ref 0–6)
GLUCOSE SERPL-MCNC: 116 MG/DL — HIGH (ref 70–99)
HCT VFR BLD CALC: 46.5 % — SIGNIFICANT CHANGE UP (ref 39–50)
HGB BLD-MCNC: 15.1 G/DL — SIGNIFICANT CHANGE UP (ref 13–17)
IMM GRANULOCYTES NFR BLD AUTO: 0.5 % — SIGNIFICANT CHANGE UP (ref 0–0.9)
LYMPHOCYTES # BLD AUTO: 1.55 K/UL — SIGNIFICANT CHANGE UP (ref 1–3.3)
LYMPHOCYTES # BLD AUTO: 25.2 % — SIGNIFICANT CHANGE UP (ref 13–44)
MCHC RBC-ENTMCNC: 28.3 PG — SIGNIFICANT CHANGE UP (ref 27–34)
MCHC RBC-ENTMCNC: 32.5 GM/DL — SIGNIFICANT CHANGE UP (ref 32–36)
MCV RBC AUTO: 87.1 FL — SIGNIFICANT CHANGE UP (ref 80–100)
MONOCYTES # BLD AUTO: 0.43 K/UL — SIGNIFICANT CHANGE UP (ref 0–0.9)
MONOCYTES NFR BLD AUTO: 7 % — SIGNIFICANT CHANGE UP (ref 2–14)
NEUTROPHILS # BLD AUTO: 3.99 K/UL — SIGNIFICANT CHANGE UP (ref 1.8–7.4)
NEUTROPHILS NFR BLD AUTO: 65 % — SIGNIFICANT CHANGE UP (ref 43–77)
NRBC # BLD: 0 /100 WBCS — SIGNIFICANT CHANGE UP (ref 0–0)
PLATELET # BLD AUTO: 254 K/UL — SIGNIFICANT CHANGE UP (ref 150–400)
POTASSIUM SERPL-MCNC: 3.9 MMOL/L — SIGNIFICANT CHANGE UP (ref 3.5–5.3)
POTASSIUM SERPL-SCNC: 3.9 MMOL/L — SIGNIFICANT CHANGE UP (ref 3.5–5.3)
PROT SERPL-MCNC: 7.9 G/DL — SIGNIFICANT CHANGE UP (ref 6–8.3)
RBC # BLD: 5.34 M/UL — SIGNIFICANT CHANGE UP (ref 4.2–5.8)
RBC # FLD: 13.6 % — SIGNIFICANT CHANGE UP (ref 10.3–14.5)
SODIUM SERPL-SCNC: 141 MMOL/L — SIGNIFICANT CHANGE UP (ref 135–145)
WBC # BLD: 6.14 K/UL — SIGNIFICANT CHANGE UP (ref 3.8–10.5)
WBC # FLD AUTO: 6.14 K/UL — SIGNIFICANT CHANGE UP (ref 3.8–10.5)

## 2024-10-07 PROCEDURE — 99232 SBSQ HOSP IP/OBS MODERATE 35: CPT

## 2024-10-07 RX ADMIN — Medication 81 MILLIGRAM(S): at 12:25

## 2024-10-07 RX ADMIN — ENOXAPARIN SODIUM 40 MILLIGRAM(S): 150 INJECTION SUBCUTANEOUS at 12:25

## 2024-10-07 RX ADMIN — BACITRACIN 1 APPLICATION(S): 500 OINTMENT TOPICAL at 12:25

## 2024-10-07 RX ADMIN — PANTOPRAZOLE SODIUM 40 MILLIGRAM(S): 40 TABLET, DELAYED RELEASE ORAL at 05:47

## 2024-10-07 RX ADMIN — LOSARTAN POTASSIUM 100 MILLIGRAM(S): 100 TABLET, FILM COATED ORAL at 05:46

## 2024-10-07 RX ADMIN — ATORVASTATIN CALCIUM 40 MILLIGRAM(S): 10 TABLET, FILM COATED ORAL at 21:44

## 2024-10-07 NOTE — PROGRESS NOTE ADULT - ATTENDING COMMENTS
Progress note amended to include my discussions with patient, resident, hospitalist, RN    Case discussed with wife and patient with use of Mandarin language line  Claudia #[id number].     Patient and wife are doing well. He reports receiving 4th dose of nizoral shampoo with overall significant improvement in itching and dandruff. Has noted some mild residual dandruff; encouraged to use his regular dandruff shampoo if needed, absorption of ketoconazole and concerns given history of transaminitis discussed, they are agreeable. They are aware of discharge plan for the 10/10 and agreeable; SW to follow up with family for HIPPA forms and we will send records to PCP as he is aware of importance of follow up, and communication to non-[facility] physician.    STable over weekend. Vitals stable, LFTs continue to improve, tolerating lower dose statin    Continue program. Improving strength and coordination in left UE and including left hand with gross grasp 3+/5      RECENT LABS    Vital Signs Last 24 Hrs  T(C): 36.8 (07 Oct 2024 09:30), Max: 36.8 (07 Oct 2024 09:30)  T(F): 98.2 (07 Oct 2024 09:30), Max: 98.2 (07 Oct 2024 09:30)  HR: 74 (07 Oct 2024 09:30) (64 - 74)  BP: 117/80 (07 Oct 2024 09:30) (117/80 - 150/67)  BP(mean): --  RR: 16 (07 Oct 2024 09:30) (15 - 16)  SpO2: 96% (07 Oct 2024 09:30) (95% - 97%)    Parameters below as of 07 Oct 2024 09:30  Patient On (Oxygen Delivery Method): room air                              15.1   6.14  )-----------( 254      ( 07 Oct 2024 06:40 )             46.5     10-07    141  |  103  |  16  ----------------------------<  116[H]  3.9   |  29  |  0.94    Ca    9.3      07 Oct 2024 06:40    TPro  7.9  /  Alb  4.1  /  TBili  0.6  /  DBili  x   /  AST  39  /  ALT  93[H]  /  AlkPhos  91  10-07      Urinalysis Basic - ( 07 Oct 2024 06:40 )    Color: x / Appearance: x / SG: x / pH: x  Gluc: 116 mg/dL / Ketone: x  / Bili: x / Urobili: x   Blood: x / Protein: x / Nitrite: x   Leuk Esterase: x / RBC: x / WBC x   Sq Epi: x / Non Sq Epi: x / Bacteria: x      CAPILLARY BLOOD GLUCOSE

## 2024-10-07 NOTE — DISCHARGE NOTE PROVIDER - NSDCMRMEDTOKEN_GEN_ALL_CORE_FT
aspirin 81 mg oral tablet, chewable: 1 tab(s) orally once a day  atorvastatin 40 mg oral tablet: 1 tab(s) orally once a day (at bedtime)  losartan 100 mg oral tablet: 1 tab(s) orally once a day  pantoprazole 40 mg oral delayed release tablet: 1 tab(s) orally once a day (before a meal)

## 2024-10-07 NOTE — PROGRESS NOTE ADULT - ASSESSMENT
70 y/o M with PMH of HTN, HLD who presents to [facility] on 9/19/24 with acute onset gait difficulties, left side weakness, slurred speech, intermittent dizziness, and diplopia secondary to R pontine and L callosal infarct. Hospital course significant for elevated LFTs/ cholelithiasis (to be managed as outpt).    #R pontine and L callosal infarct with left side weakness, slurred speech, intermittent dizziness, and diplopia.  -TTE (9/20)- normal LV function  -S/P ILR for afib surveillance  -Aspirin 81mg daily  -Atorvastatin dose decreased to 40mg HS due to increasing LFT's    #Transaminitis  -H/o cholelithiasis. Asymptomatic  -Continue lipitor 40mg qhs  -CT C/A/P (9/23)- Cholelithiasis with nonspecific thickening of the gallbladder wall  -Avoid hepatotoxic meds  -Outpatient GI follow up    #HTN  -Continue Losartan 100mg daily    #HLD  -Continue Atorvastatin 40mg qhs    DVT ppx - Lovenox  GI ppx - PPI

## 2024-10-07 NOTE — PROGRESS NOTE ADULT - SUBJECTIVE AND OBJECTIVE BOX
Patient is a 69y old  Male who presents with a chief complaint of R pontine and L callosal infarct (06 Oct 2024 14:39)    HPI:  68 y/o male RH-dominant, with a PMH of HTN, HLD who presents to [facility] on 9/19/24 with acute onset gait difficulties, left side weakness, slurred speech, intermittent dizziness, and diplopia. LKN is 0700 am 9/18. 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 TTE showed normal LV function. Patient was not a candidate for TNK as he was outside of appropriate window, nor mechanical thrombectomy as there was no LVO. Aspirin and high dose statin was initiated.    Hospital course significant for elevated LFTs, CT C/A/P showed cholelithiasis, patient is recommended to f/u with outpatient GI as he is asymptomatic.     Patient optimized and was evaluated by PM&R and therapy for functional deficits, gait/ADL impairments and acute rehabilitation was recommended. Patient was cleared for discharge to [facility] IRF on 9/23/24. (23 Sep 2024 15:20)      SUBJECTIVE  Interval History:  Patient seen and examined at bedside. He states he is doing well and had a restful weekend. He notes that movement in the L arm has improved over last several days and is excited to show off picking up a phone with L hand. No complaints No acute events overnight    Allergies    grass (Sneezing)  No Known Drug Allergies  dust (Sneezing)    Intolerances        OBJECTIVE    69y  Vital Signs Last 24 Hrs  T(C): 36.7 (06 Oct 2024 20:05), Max: 36.7 (06 Oct 2024 18:30)  T(F): 98 (06 Oct 2024 20:05), Max: 98 (06 Oct 2024 18:30)  HR: 73 (07 Oct 2024 05:44) (64 - 73)  BP: 133/84 (07 Oct 2024 05:44) (122/77 - 150/67)  BP(mean): --  RR: 16 (06 Oct 2024 20:05) (15 - 16)  SpO2: 95% (06 Oct 2024 20:05) (95% - 97%)    Parameters below as of 06 Oct 2024 20:05  Patient On (Oxygen Delivery Method): room air      Daily     Daily       MEDICATIONS  (STANDING):  aspirin  chewable 81 milliGRAM(s) Oral daily  atorvastatin 40 milliGRAM(s) Oral at bedtime  bacitracin   Ointment 1 Application(s) Topical daily  enoxaparin Injectable 40 milliGRAM(s) SubCutaneous every 24 hours  ketoconazole 2% Shampoo 1 Application(s) Topical once  losartan 100 milliGRAM(s) Oral daily  pantoprazole    Tablet 40 milliGRAM(s) Oral before breakfast    MEDICATIONS  (PRN):  acetaminophen     Tablet .. 650 milliGRAM(s) Oral every 6 hours PRN Mild Pain (1 - 3)  melatonin 6 milliGRAM(s) Oral at bedtime PRN Insomnia  polyethylene glycol 3350 17 Gram(s) Oral daily PRN Constipation  senna 2 Tablet(s) Oral at bedtime PRN Constipation      RECENT LABS:                          15.1   6.14  )-----------( 254      ( 07 Oct 2024 06:40 )             46.5     10-07    141  |  103  |  16  ----------------------------<  116[H]  3.9   |  29  |  0.94    Ca    9.3      07 Oct 2024 06:40    TPro  7.9  /  Alb  4.1  /  TBili  0.6  /  DBili  x   /  AST  39  /  ALT  93[H]  /  AlkPhos  91  10-07    LIVER FUNCTIONS - ( 07 Oct 2024 06:40 )  Alb: 4.1 g/dL / Pro: 7.9 g/dL / ALK PHOS: 91 U/L / ALT: 93 U/L / AST: 39 U/L / GGT: x             Urinalysis Basic - ( 07 Oct 2024 06:40 )    Color: x / Appearance: x / SG: x / pH: x  Gluc: 116 mg/dL / Ketone: x  / Bili: x / Urobili: x   Blood: x / Protein: x / Nitrite: x   Leuk Esterase: x / RBC: x / WBC x   Sq Epi: x / Non Sq Epi: x / Bacteria: x        CAPILLARY BLOOD GLUCOSE

## 2024-10-07 NOTE — DISCHARGE NOTE PROVIDER - NSDCCPCAREPLAN_GEN_ALL_CORE_FT
PRINCIPAL DISCHARGE DIAGNOSIS  Diagnosis: Cerebrovascular accident (CVA)  Assessment and Plan of Treatment: You were intially admitted to the hospital after suffering a stroke which caused left side weakness. You were started on aspirin and atorvastatin to prevent further strokes. A loop recorded was surgically placed to monitor for heart rhythms which could have contributed to your stroke. You completed a comprehensive rehab program and made significant improments in your neurologic deficits.      SECONDARY DISCHARGE DIAGNOSES  Diagnosis: Transaminitis  Assessment and Plan of Treatment: During your hospital admission, your blood work showed some injury to your liver funciton. An ultrasound showed that you had gallstones but this did not appear to be the cause of the elevated liver enzymes. We stopped your Atorvastatin temporarily and your liver function improved. This is a common side effect of this medication. We were able to restart the atorvastatin at a lower dose and your liver function is now stable.    Diagnosis: Cholelithiasis  Assessment and Plan of Treatment: See above.    Diagnosis: Tinea capitis  Assessment and Plan of Treatment: You had itching of the scalp that was successfully treated with ketoconazole shampoo

## 2024-10-07 NOTE — DISCHARGE NOTE PROVIDER - NSDCFUADDAPPT_GEN_ALL_CORE_FT
Libman, Richard Benjamin  Neurology  [address] [facility] [facility], [address]  [place] [address]  Phone: [phone]  Fax: [phone]  Follow Up Time: 2 weeks    Edin Neves  Cardiology  [address] [facility], [address]  [place] [address]  Phone: [phone]  Fax: [phone]  Follow Up Time: 2 weeks    Felice Hazel  Cardiac Electrophysiology  [address] [place] [address], [id number]  [place] [address]  Phone: [phone]  Fax: [phone]  Follow Up Time: 2 weeks    Felice Herrera  Gastroenterology  [address] [place] [address]  Phone: [phone]  Fax: [phone]  Follow Up Time: 2 weeks    Dr. Marcela Lynn, Primary Care

## 2024-10-07 NOTE — DISCHARGE NOTE PROVIDER - HOSPITAL COURSE
HPI:  70 y/o male RH-dominant, with a PMH of HTN, HLD who presents to [facility] on 9/19/24 with acute onset gait difficulties, left side weakness, slurred speech, intermittent dizziness, and diplopia. LKN is 0700 am 9/18. 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 TTE showed normal LV function. Patient was not a candidate for TNK as he was outside of appropriate window, nor mechanical thrombectomy as there was no LVO. Aspirin and high dose statin was initiated. Hospital course significant for elevated LFTs, CT C/A/P showed cholelithiasis, patient is recommended to f/u with outpatient GI as he is asymptomatic. Patient optimized and was evaluated by PM&R and therapy for functional deficits, gait/ADL impairments and acute rehabilitation was recommended. Patient was cleared for discharge to [facility] IRF on 9/23/24. (23 Sep 2024 15:20)    Pt was stable upon rehab admission to  [facility]. Admitted with gait instabilty, ADL, and functional impairments.     Rehab Course significant for:  -Transaminitis which improved after decreasing atorvastatin dose to 40 mg.   - Pruritic scalp which patient had prior to hospitalization and was treated with ketoconazole shampoo     All other medical co-morbidities were stable.     Pt tolerated course of inpatient PT/OT/SLP rehab with significant functional improvements and met rehab goals prior to discharge.    Pt was medically cleared on 10/10/24  for discharged to home with services.      HPI:  68 y/o male RH-dominant, with a PMH of HTN, HLD who presents to [facility] on 9/19/24 with acute onset gait difficulties, left side weakness, slurred speech, intermittent dizziness, and diplopia. LKN is 0700 am 9/18. 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 TTE showed normal LV function. Patient was not a candidate for TNK as he was outside of appropriate window, nor mechanical thrombectomy as there was no LVO. Aspirin and high dose statin was initiated. Hospital course significant for elevated LFTs, CT C/A/P showed cholelithiasis, patient is recommended to f/u with outpatient GI as he is asymptomatic. Patient optimized and was evaluated by PM&R and therapy for functional deficits, gait/ADL impairments and acute rehabilitation was recommended. Patient was cleared for discharge to [facility] IRF on 9/23/24. (23 Sep 2024 15:20)    Pt was stable upon rehab admission to  [facility]patient [facility]. Admitted with gait instabilty, ADL, and functional impairments.     Rehab Course significant for transaminitis which improved after decreasing atorvastatin dose to 40 mg. Patient was asymptomatic, still with mild ALT elevation, to be followed as outpoatient. He also had pruritic scalpsecondary to resistant dandruff, chronic,  treated with ketoconazole shampoo with improvement. ILR site clean and healing well, to follow wit EP/cardiology on dc.                          15.1   6.14  )-----------( 254      ( 07 Oct 2024 06:40 )             46.5     10-07    141  |  103  |  16  ----------------------------<  116[H]  3.9   |  29  |  0.94    Ca    9.3      07 Oct 2024 06:40    TPro  7.9  /  Alb  4.1  /  TBili  0.6  /  DBili  x   /  AST  39  /  ALT  93[H]  /  AlkPhos  91  10-07          All other medical co-morbidities were stable.     Pt tolerated course of inpatient PT/OT/SLP rehab with significant functional improvements and met rehab goals prior to discharge. He had significant improvement in his left sided strength, coordination and regained some left hand function/ability to manipulate objects, and will continue with home PT OT SLP and neurology, PCP, cardiology/EP, PMR follow up on dc.    Pt was medically cleared on 10/10/24  for discharged to home with services.

## 2024-10-07 NOTE — PROGRESS NOTE ADULT - ASSESSMENT
MICA LE is a 70 y/o male RHD with a PMH of HTN, HLD who presents to [facility] on 9/19/24 with acute onset gait difficulties, left side weakness, slurred speech, intermittent dizziness, and diplopia secondary to R pontine and L callosal infarct. Hospital course significant for elevated LFTs/ cholelithiasis (to be managed as outpt).    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   - S/P ILR for afib surveillance. DSD daily  - Aspirin 81mg daily  - Atorvastatin 40mg HS- LFTs improving with reduction  - continue comprehensive rehab program, 3 hours a day, 5 days a week. OT PT SLP  - recreation therapy and psychology consult  - Precautions: cardiac, fall, vision, ILR    # HTN  - Losartan 100mg daily  - BP (127/75 - 150/67) 10/7    # HLD  - Decreased to Atorvastatin 40mg HS 9/26    # cholelithiasis  # transaminitis   - CT C/A/P (9/23)- Cholelithiasis with nonspecific thickening of the gallbladder wall. Correlate with LFTs.  - AST  104[H]  /  ALT  177[H]  /  AlkPhos  102  09-26. ->  AST  47[H]  /  ALT  102[H]  /  AlkPhos  96  10-03 improved  - avoid hepatotoxic meds  - decreased atorvastatin  - DC tylenol.   - CMP 10/10  - outpatient GI follow up    # Sleep:   - Melatonin 6mg PRN    # Pain Management:  - Tylenol stopped    # GI/Bowel:  - Senna QHS, Miralax PRN Daily  - GI ppx: Pantoprazole 40mg    # Skin/Pressure Injury:   - Skin assessment on admission: loop recorder site c/d/i  - Itching scalp, resistant dandruff. s/p Ketoconazole shampoo x 3.     # Diet  - Regular- DASH    #  DVT ppx:  -  Lovenox 40mg daily    # Case discussed in IDT rounds 10/2 (follow up)  - skin intact, continent B/B, eating and grooming set up, CG toileting, bathing min assist, UB supervision, LB CG, toilet and shower transfers CG-min assist, independent bed mobility, CG transfers, ambulation 115 feet QC and CG, 15 steps with 1 HR and min assist. Would recommend NBQC on dc  - barriers: LUE weakness, visual perceptual deficits, reduced balance, coordination, dizziness  - goals: ambulate to commode with supervision (progressing), perform toileting tasks with set up (progressing)  - target: supervision with iADLs, set up toileting and dressing, supervision transfers, supervision ambulation. 10/10 dc home [facility] caregiver training     # LABS   CBC CMP 10/10    ---------------  Code Status: FULL  Emergency Contact: SON: EDILMA [phone]    PCP Dr Marcela Lynn [phone] (office) and [phone]    Outpatient Follow-up (Specialty/Name of physician):    Libman, Richard Benjamin  Neurology  [address] [facility] [facility], [address]  [place] [address]  Phone: [phone]  Fax: [phone]  Follow Up Time: 2 weeks    Edin Neves  Cardiology  [address] [facility], [address]  [place] [address]  Phone: [phone]  Fax: [phone]  Follow Up Time: 2 weeks    Felice Hazel  Cardiac Electrophysiology  [address] [place] [address], [id number]  [place] [address]  Phone: [phone]  Fax: [phone]  Follow Up Time: 2 weeks    Felice Herrera  Gastroenterology  [address] [place] [address]  Phone: [phone]  Fax: [phone]  Follow Up Time: 2 weeks    Primary Care Dr. Lynn  [phone]  Send all DC paper work to Dr. Lynn

## 2024-10-07 NOTE — PROGRESS NOTE ADULT - SUBJECTIVE AND OBJECTIVE BOX
Patient is a 69y old  Male who presents with a chief complaint of R pontine and L callosal infarct (07 Oct 2024 09:28)      SUBJECTIVE / OVERNIGHT EVENTS:  Pt seen and examined at bedside. No acute events overnight.  Pt denies cp, palpitations, sob, abd pain, N/V, fever, chills.    ROS:  All other review of systems negative    Allergies    grass (Sneezing)  No Known Drug Allergies  dust (Sneezing)    Intolerances        MEDICATIONS  (STANDING):  aspirin  chewable 81 milliGRAM(s) Oral daily  atorvastatin 40 milliGRAM(s) Oral at bedtime  bacitracin   Ointment 1 Application(s) Topical daily  enoxaparin Injectable 40 milliGRAM(s) SubCutaneous every 24 hours  ketoconazole 2% Shampoo 1 Application(s) Topical once  losartan 100 milliGRAM(s) Oral daily  pantoprazole    Tablet 40 milliGRAM(s) Oral before breakfast    MEDICATIONS  (PRN):  acetaminophen     Tablet .. 650 milliGRAM(s) Oral every 6 hours PRN Mild Pain (1 - 3)  melatonin 6 milliGRAM(s) Oral at bedtime PRN Insomnia  polyethylene glycol 3350 17 Gram(s) Oral daily PRN Constipation  senna 2 Tablet(s) Oral at bedtime PRN Constipation      Vital Signs Last 24 Hrs  T(C): 36.8 (07 Oct 2024 09:30), Max: 36.8 (07 Oct 2024 09:30)  T(F): 98.2 (07 Oct 2024 09:30), Max: 98.2 (07 Oct 2024 09:30)  HR: 74 (07 Oct 2024 09:30) (64 - 74)  BP: 117/80 (07 Oct 2024 09:30) (117/80 - 150/67)  BP(mean): --  RR: 16 (07 Oct 2024 09:30) (15 - 16)  SpO2: 96% (07 Oct 2024 09:30) (95% - 97%)    Parameters below as of 07 Oct 2024 09:30  Patient On (Oxygen Delivery Method): room air      CAPILLARY BLOOD GLUCOSE        I&O's Summary      PHYSICAL EXAM:  GENERAL: NAD, well-developed male  HEAD:  Atraumatic, Normocephalic  NECK: Supple, No JVD  CHEST/LUNG: Clear to auscultation bilaterally; No wheeze, nonlabored breathing  HEART: Regular rate and rhythm; No murmurs, rubs, or gallops  ABDOMEN: Soft, Nontender, Nondistended; Bowel sounds present  EXTREMITIES:  No clubbing, cyanosis, or edema  PSYCH: calm, appropriate mood    LABS:                        15.1   6.14  )-----------( 254      ( 07 Oct 2024 06:40 )             46.5     10-07    141  |  103  |  16  ----------------------------<  116[H]  3.9   |  29  |  0.94    Ca    9.3      07 Oct 2024 06:40    TPro  7.9  /  Alb  4.1  /  TBili  0.6  /  DBili  x   /  AST  39  /  ALT  93[H]  /  AlkPhos  91  10-07          Urinalysis Basic - ( 07 Oct 2024 06:40 )    Color: x / Appearance: x / SG: x / pH: x  Gluc: 116 mg/dL / Ketone: x  / Bili: x / Urobili: x   Blood: x / Protein: x / Nitrite: x   Leuk Esterase: x / RBC: x / WBC x   Sq Epi: x / Non Sq Epi: x / Bacteria: x        RADIOLOGY & ADDITIONAL TESTS:  Results Reviewed:   Imaging Personally Reviewed:  Electrocardiogram Personally Reviewed:    COORDINATION OF CARE:  Care Discussed with Consultants/Other Providers [Y/N]:  Prior or Outpatient Records Reviewed [Y/N]:

## 2024-10-07 NOTE — DISCHARGE NOTE PROVIDER - CARE PROVIDER_API CALL
BUD VANESSA  [address] [facility], [address] [address]  Phone: [phone]  Fax: [phone]  Established Patient  Follow Up Time: 1 week

## 2024-10-08 ENCOUNTER — TRANSCRIPTION ENCOUNTER (OUTPATIENT)
Age: 69
End: 2024-10-08

## 2024-10-08 PROCEDURE — 99232 SBSQ HOSP IP/OBS MODERATE 35: CPT

## 2024-10-08 RX ORDER — PANTOPRAZOLE SODIUM 40 MG/1
1 TABLET, DELAYED RELEASE ORAL
Qty: 30 | Refills: 0
Start: 2024-10-08

## 2024-10-08 RX ORDER — ATORVASTATIN CALCIUM 10 MG/1
1 TABLET, FILM COATED ORAL
Qty: 30 | Refills: 0
Start: 2024-10-08

## 2024-10-08 RX ORDER — INFLUENZA VIRUS VACCINE 15; 15; 15; 15 UG/.5ML; UG/.5ML; UG/.5ML; UG/.5ML
0.5 SUSPENSION INTRAMUSCULAR ONCE
Refills: 0 | Status: COMPLETED | OUTPATIENT
Start: 2024-10-08 | End: 2024-10-10

## 2024-10-08 RX ORDER — LOSARTAN POTASSIUM 100 MG/1
1 TABLET, FILM COATED ORAL
Qty: 30 | Refills: 0
Start: 2024-10-08

## 2024-10-08 RX ORDER — ASPIRIN 325 MG
1 TABLET ORAL
Qty: 30 | Refills: 0
Start: 2024-10-08

## 2024-10-08 RX ADMIN — ATORVASTATIN CALCIUM 40 MILLIGRAM(S): 10 TABLET, FILM COATED ORAL at 21:01

## 2024-10-08 RX ADMIN — Medication 81 MILLIGRAM(S): at 12:22

## 2024-10-08 RX ADMIN — LOSARTAN POTASSIUM 100 MILLIGRAM(S): 100 TABLET, FILM COATED ORAL at 05:19

## 2024-10-08 RX ADMIN — ENOXAPARIN SODIUM 40 MILLIGRAM(S): 150 INJECTION SUBCUTANEOUS at 12:22

## 2024-10-08 RX ADMIN — BACITRACIN 1 APPLICATION(S): 500 OINTMENT TOPICAL at 12:22

## 2024-10-08 RX ADMIN — PANTOPRAZOLE SODIUM 40 MILLIGRAM(S): 40 TABLET, DELAYED RELEASE ORAL at 05:19

## 2024-10-08 NOTE — PROGRESS NOTE ADULT - ASSESSMENT
70 y/o M with PMH of HTN, HLD who presents to [facility] on 9/19/24 with acute onset gait difficulties, left side weakness, slurred speech, intermittent dizziness, and diplopia secondary to R pontine and L callosal infarct. Hospital course significant for elevated LFTs/ cholelithiasis (to be managed as outpt).    #R pontine and L callosal infarct with left side weakness, slurred speech, intermittent dizziness, and diplopia.  -TTE (9/20)- normal LV function  -S/P ILR for afib surveillance  -Aspirin 81mg daily  -Atorvastatin dose decreased to 40mg HS due to increasing LFT's    #Transaminitis  -H/o cholelithiasis. Asymptomatic and stable  -Continue lipitor 40mg qhs  -CT C/A/P (9/23)- Cholelithiasis with nonspecific thickening of the gallbladder wall  -Avoid hepatotoxic meds  -Outpatient GI follow up    #HTN  -Continue Losartan 100mg daily    #HLD  -Continue Atorvastatin 40mg qhs    DVT ppx - Lovenox  GI ppx - PPI Yes

## 2024-10-08 NOTE — PROGRESS NOTE ADULT - SUBJECTIVE AND OBJECTIVE BOX
CC: Patient is a 69y old  Male who presents with a chief complaint of R pontine and L callosal infarct (08 Oct 2024 09:35)      Interval History:  Patient seen and examined at bedside.  No overnight events  Denies CP, SOB, nausea, vomiting, dysuria    ALLERGIES:  grass (Sneezing)  No Known Drug Allergies  dust (Sneezing)    MEDICATIONS  (STANDING):  aspirin  chewable 81 milliGRAM(s) Oral daily  atorvastatin 40 milliGRAM(s) Oral at bedtime  bacitracin   Ointment 1 Application(s) Topical daily  enoxaparin Injectable 40 milliGRAM(s) SubCutaneous every 24 hours  ketoconazole 2% Shampoo 1 Application(s) Topical once  losartan 100 milliGRAM(s) Oral daily  pantoprazole    Tablet 40 milliGRAM(s) Oral before breakfast    MEDICATIONS  (PRN):  acetaminophen     Tablet .. 650 milliGRAM(s) Oral every 6 hours PRN Mild Pain (1 - 3)  melatonin 6 milliGRAM(s) Oral at bedtime PRN Insomnia  polyethylene glycol 3350 17 Gram(s) Oral daily PRN Constipation  senna 2 Tablet(s) Oral at bedtime PRN Constipation    Vital Signs Last 24 Hrs  T(F): 97.8 (08 Oct 2024 07:45), Max: 98.3 (07 Oct 2024 19:54)  HR: 63 (08 Oct 2024 07:45) (61 - 69)  BP: 130/81 (08 Oct 2024 07:45) (112/72 - 138/82)  RR: 16 (08 Oct 2024 07:45) (16 - 16)  SpO2: 95% (08 Oct 2024 07:45) (95% - 98%)  I&O's Summary        PHYSICAL EXAM:  GENERAL: NAD, well-developed male  HEAD:  Atraumatic, Normocephalic  NECK: Supple, No JVD  CHEST/LUNG: Clear to auscultation bilaterally; No wheeze, nonlabored breathing  HEART: Regular rate and rhythm; No murmurs, rubs, or gallops  ABDOMEN: Soft, Nontender, Nondistended; Bowel sounds present  EXTREMITIES:  No clubbing, cyanosis, or edema  PSYCH: calm, appropriate mood      LABS:                        15.1   6.14  )-----------( 254      ( 07 Oct 2024 06:40 )             46.5       10-07    141  |  103  |  16  ----------------------------<  116  3.9   |  29  |  0.94    Ca    9.3      07 Oct 2024 06:40    TPro  7.9  /  Alb  4.1  /  TBili  0.6  /  DBili  x   /  AST  39  /  ALT  93  /  AlkPhos  91  10-07                09-20 Chol 175 mg/dL LDL -- HDL 33 mg/dL Trig 240 mg/dL                  Urinalysis Basic - ( 07 Oct 2024 06:40 )    Color: x / Appearance: x / SG: x / pH: x  Gluc: 116 mg/dL / Ketone: x  / Bili: x / Urobili: x   Blood: x / Protein: x / Nitrite: x   Leuk Esterase: x / RBC: x / WBC x   Sq Epi: x / Non Sq Epi: x / Bacteria: x        COVID-19 PCR: NotDetec (09-23-24 @ 19:52)      Care Discussed with Consultants/Other Providers: Yes

## 2024-10-08 NOTE — PROGRESS NOTE ADULT - ATTENDING COMMENTS
Progress note amended to include my discussions with patient, resident, hospitalist, RN, SW, PT and my findings    Patient seen with wife and Mandarin language line  Marina #[id number].    Patient doing well. He said "getting better" when discussing his left arm. left shoulder abduction now elevated to 105 degrees actively; and motor testing 4-/5. elbow flexion 4/5. gross grasp 4-/5, and was able to  pen using thumb and index and middle finger. Still with issues of distal strength and gross and fine motor coordination, but continues to improved. Continuation of therapies on dc discussed. Wife and dangtent confirmed that SW met with them, and HIPPA paperwork provided to send information to PCP. Also informed them to bring dc instructions with them to their physicians with current meds on dc    ILR site with dressing in place. They inquire why ILR was done, why he developed stroke. Current results workup, evaluation for possible arrhtyhmia and how that would affect treatment/meds, as well as other risk factors discussed    Cotninue program, on track for dc 10/10        RECENT LABS    Vital Signs Last 24 Hrs  T(C): 36.6 (08 Oct 2024 07:45), Max: 36.8 (07 Oct 2024 19:54)  T(F): 97.8 (08 Oct 2024 07:45), Max: 98.3 (07 Oct 2024 19:54)  HR: 63 (08 Oct 2024 07:45) (61 - 69)  BP: 130/81 (08 Oct 2024 07:45) (112/72 - 138/82)  BP(mean): --  RR: 16 (08 Oct 2024 07:45) (16 - 16)  SpO2: 95% (08 Oct 2024 07:45) (95% - 98%)    Parameters below as of 08 Oct 2024 07:45  Patient On (Oxygen Delivery Method): room air                              15.1   6.14  )-----------( 254      ( 07 Oct 2024 06:40 )             46.5     10-07    141  |  103  |  16  ----------------------------<  116[H]  3.9   |  29  |  0.94    Ca    9.3      07 Oct 2024 06:40    TPro  7.9  /  Alb  4.1  /  TBili  0.6  /  DBili  x   /  AST  39  /  ALT  93[H]  /  AlkPhos  91  10-07      Urinalysis Basic - ( 07 Oct 2024 06:40 )    Color: x / Appearance: x / SG: x / pH: x  Gluc: 116 mg/dL / Ketone: x  / Bili: x / Urobili: x   Blood: x / Protein: x / Nitrite: x   Leuk Esterase: x / RBC: x / WBC x   Sq Epi: x / Non Sq Epi: x / Bacteria: x      CAPILLARY BLOOD GLUCOSE

## 2024-10-08 NOTE — CHART NOTE - NSCHARTNOTEFT_GEN_A_CORE
NUTRITION Follow Up Note    Pt with adequate appetite/intake at this time. RD visited pt during breakfast consuming ~% of breakfast provided. Pt with no reported issues chewing/swallowing. No reported N/V/D/C. LBM 9/30.     SOURCE: Patient [X]  Medical Record [X]  Nursing Staff [X]  Family Member []    DIET: Diet, Regular (09-23-24 @ 16:01) [Active]          EDEMA: None Noted     LAST BM: 9/30    SKIN: No pressure injuries noted     WEIGHT TRENDS: 145lbs       PERTINENT MEDICATIONS: MEDICATIONS  (STANDING):  aspirin  chewable 81 milliGRAM(s) Oral daily  atorvastatin 40 milliGRAM(s) Oral at bedtime  bacitracin   Ointment 1 Application(s) Topical daily  enoxaparin Injectable 40 milliGRAM(s) SubCutaneous every 24 hours  losartan 100 milliGRAM(s) Oral daily  pantoprazole    Tablet 40 milliGRAM(s) Oral before breakfast  polyethylene glycol 3350 17 Gram(s) Oral daily  senna 2 Tablet(s) Oral at bedtime    MEDICATIONS  (PRN):  acetaminophen     Tablet .. 650 milliGRAM(s) Oral every 6 hours PRN Mild Pain (1 - 3)  melatonin 6 milliGRAM(s) Oral at bedtime PRN Insomnia      PERTINENT LABS:  09-30 Na142 mmol/L Glu 111 mg/dL[H] K+ 4.5 mmol/L Cr  1.00 mg/dL BUN 15 mg/dL 09-30 Alb 3.7 g/dL 09-20 Chol 175 mg/dL LDL --    HDL 33 mg/dL[L] Trig 240 mg/dL[H]        ESTIMATED NEEDS:   [X] No Change Since Previous Assessment    PREVIOUS NUTRITION DIAGNOSIS: Inadequate Energy Intake  2/2 current acute illness  evidenced by wt loss during sudden hospitalization  Pt to meet 75% or > EER during current hospitalization        NUTRITION DIAGNOSIS IS [X] Ongoing  NEW NUTRITION DIAGNOSIS: [X] Not Applicable    INTERVENTIONS:   1. Continue Current Nutrition Care Regimen at This Time.     MONITORING & EVALUATION:   1. Weights   2. PO intakes   3. Skin integrity   4. Tolerance to diet prescription   5. Labs & POCT  6. Follow up (per protocol)    Registered Dietitian/Nutritionist Remains Available.  Bhavin Hess RD    Contact: [phone] or via [place] TEAMS
NUTRITION Follow Up Note    RD visited pt during breakfast & pt with adequate intake. Pt consuming ~% of meals provided consistently as per visits and flow sheets. Pt denies chewing/swallowing issues. No reported N/V/D/C pt reported having urge to use the bathroom during visit. LBM 10/07.     SOURCE: Patient [X]  Medical Record [X]  Nursing Staff [X]  Family Member []    DIET: Diet, Regular (09-23-24 @ 16:01) [Active]          EDEMA: None Noted     LAST BM: 10/07    SKIN: No pressure injuries     WEIGHT TRENDS: 145.2lbs   No New Weights     PERTINENT MEDICATIONS: MEDICATIONS  (STANDING):  aspirin  chewable 81 milliGRAM(s) Oral daily  atorvastatin 40 milliGRAM(s) Oral at bedtime  bacitracin   Ointment 1 Application(s) Topical daily  enoxaparin Injectable 40 milliGRAM(s) SubCutaneous every 24 hours  ketoconazole 2% Shampoo 1 Application(s) Topical once  losartan 100 milliGRAM(s) Oral daily  pantoprazole    Tablet 40 milliGRAM(s) Oral before breakfast    MEDICATIONS  (PRN):  acetaminophen     Tablet .. 650 milliGRAM(s) Oral every 6 hours PRN Mild Pain (1 - 3)  melatonin 6 milliGRAM(s) Oral at bedtime PRN Insomnia  polyethylene glycol 3350 17 Gram(s) Oral daily PRN Constipation  senna 2 Tablet(s) Oral at bedtime PRN Constipation      PERTINENT LABS:  10-07 Na141 mmol/L Glu 116 mg/dL[H] K+ 3.9 mmol/L Cr  0.94 mg/dL BUN 16 mg/dL 10-07 Alb 4.1 g/dL 09-20 Chol 175 mg/dL LDL --    HDL 33 mg/dL[L] Trig 240 mg/dL[H]        ESTIMATED NEEDS:   [X] No Change Since Previous Assessment    PREVIOUS NUTRITION DIAGNOSIS:  Inadequate Energy Intake  2/2 current acute illness  evidenced by wt loss during sudden hospitalization  Pt to meet 75% or > EER during current hospitalization      NUTRITION DIAGNOSIS IS [X]     [X] Resolved - Pt with adequate intake since admission and reaching goal of 75% or > EER during hospitalization.       NEW NUTRITION DIAGNOSIS: [X] Not Applicable    INTERVENTIONS:   1. Continue Current Nutrition Care Regimen at This Time.   2. Previous nutrition diagnosis resolved.    MONITORING & EVALUATION:   1. Weights   2. PO intakes   3. Skin integrity   4. Tolerance to diet prescription   5. Labs & POCT  6. Follow up (per protocol)    Registered Dietitian/Nutritionist Remains Available.  Bhavin Hess RD    Contact: [phone] or via MS TEAMS

## 2024-10-08 NOTE — PROGRESS NOTE ADULT - SUBJECTIVE AND OBJECTIVE BOX
Patient is a 69y old  Male who presents with a chief complaint of R pontine and L callosal infarct (07 Oct 2024 11:31)    HPI:  68 y/o male RH-dominant, with a PMH of HTN, HLD who presents to [facility] on 9/19/24 with acute onset gait difficulties, left side weakness, slurred speech, intermittent dizziness, and diplopia. LKN is 0700 am 9/18. 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 TTE showed normal LV function. Patient was not a candidate for TNK as he was outside of appropriate window, nor mechanical thrombectomy as there was no LVO. Aspirin and high dose statin was initiated.    Hospital course significant for elevated LFTs, CT C/A/P showed cholelithiasis, patient is recommended to f/u with outpatient GI as he is asymptomatic.     Patient optimized and was evaluated by PM&R and therapy for functional deficits, gait/ADL impairments and acute rehabilitation was recommended. Patient was cleared for discharge to [facility] IRF on 9/23/24. (23 Sep 2024 15:20)      SUBJECTIVE  Interval History:  Patient seen and examined this morning at bedside with wife. Patient is doing well with no new complaints. no acute events overnight.     Allergies    grass (Sneezing)  No Known Drug Allergies  dust (Sneezing)    Intolerances        OBJECTIVE    69y  Vital Signs Last 24 Hrs  T(C): 36.6 (08 Oct 2024 07:45), Max: 36.8 (07 Oct 2024 19:54)  T(F): 97.8 (08 Oct 2024 07:45), Max: 98.3 (07 Oct 2024 19:54)  HR: 63 (08 Oct 2024 07:45) (61 - 69)  BP: 130/81 (08 Oct 2024 07:45) (112/72 - 138/82)  BP(mean): --  RR: 16 (08 Oct 2024 07:45) (16 - 16)  SpO2: 95% (08 Oct 2024 07:45) (95% - 98%)    Parameters below as of 08 Oct 2024 07:45  Patient On (Oxygen Delivery Method): room air      Daily     Daily       MEDICATIONS  (STANDING):  aspirin  chewable 81 milliGRAM(s) Oral daily  atorvastatin 40 milliGRAM(s) Oral at bedtime  bacitracin   Ointment 1 Application(s) Topical daily  enoxaparin Injectable 40 milliGRAM(s) SubCutaneous every 24 hours  ketoconazole 2% Shampoo 1 Application(s) Topical once  losartan 100 milliGRAM(s) Oral daily  pantoprazole    Tablet 40 milliGRAM(s) Oral before breakfast    MEDICATIONS  (PRN):  acetaminophen     Tablet .. 650 milliGRAM(s) Oral every 6 hours PRN Mild Pain (1 - 3)  melatonin 6 milliGRAM(s) Oral at bedtime PRN Insomnia  polyethylene glycol 3350 17 Gram(s) Oral daily PRN Constipation  senna 2 Tablet(s) Oral at bedtime PRN Constipation      RECENT LABS:                          15.1   6.14  )-----------( 254      ( 07 Oct 2024 06:40 )             46.5     10-07    141  |  103  |  16  ----------------------------<  116[H]  3.9   |  29  |  0.94    Ca    9.3      07 Oct 2024 06:40    TPro  7.9  /  Alb  4.1  /  TBili  0.6  /  DBili  x   /  AST  39  /  ALT  93[H]  /  AlkPhos  91  10-07    LIVER FUNCTIONS - ( 07 Oct 2024 06:40 )  Alb: 4.1 g/dL / Pro: 7.9 g/dL / ALK PHOS: 91 U/L / ALT: 93 U/L / AST: 39 U/L / GGT: x             Urinalysis Basic - ( 07 Oct 2024 06:40 )    Color: x / Appearance: x / SG: x / pH: x  Gluc: 116 mg/dL / Ketone: x  / Bili: x / Urobili: x   Blood: x / Protein: x / Nitrite: x   Leuk Esterase: x / RBC: x / WBC x   Sq Epi: x / Non Sq Epi: x / Bacteria: x        CAPILLARY BLOOD GLUCOSE               Patient is a 69y old  Male who presents with a chief complaint of R pontine and L callosal infarct (07 Oct 2024 11:31)    HPI:  70 y/o male RH-dominant, with a PMH of HTN, HLD who presents to [facility] on 9/19/24 with acute onset gait difficulties, left side weakness, slurred speech, intermittent dizziness, and diplopia. LKN is 0700 am 9/18. 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 TTE showed normal LV function. Patient was not a candidate for TNK as he was outside of appropriate window, nor mechanical thrombectomy as there was no LVO. Aspirin and high dose statin was initiated.    Hospital course significant for elevated LFTs, CT C/A/P showed cholelithiasis, patient is recommended to f/u with outpatient GI as he is asymptomatic.     Patient optimized and was evaluated by PM&R and therapy for functional deficits, gait/ADL impairments and acute rehabilitation was recommended. Patient was cleared for discharge to [facility] IRF on 9/23/24. (23 Sep 2024 15:20)      SUBJECTIVE  Interval History:  Patient seen and examined this morning at bedside with wife. Patient is doing well with no new complaints. no acute events overnight.     Allergies    grass (Sneezing)  No Known Drug Allergies  dust (Sneezing)    Intolerances        OBJECTIVE    69y  Vital Signs Last 24 Hrs  T(C): 36.6 (08 Oct 2024 07:45), Max: 36.8 (07 Oct 2024 19:54)  T(F): 97.8 (08 Oct 2024 07:45), Max: 98.3 (07 Oct 2024 19:54)  HR: 63 (08 Oct 2024 07:45) (61 - 69)  BP: 130/81 (08 Oct 2024 07:45) (112/72 - 138/82)  BP(mean): --  RR: 16 (08 Oct 2024 07:45) (16 - 16)  SpO2: 95% (08 Oct 2024 07:45) (95% - 98%)    Parameters below as of 08 Oct 2024 07:45  Patient On (Oxygen Delivery Method): room air      Daily     Daily       MEDICATIONS  (STANDING):  aspirin  chewable 81 milliGRAM(s) Oral daily  atorvastatin 40 milliGRAM(s) Oral at bedtime  bacitracin   Ointment 1 Application(s) Topical daily  enoxaparin Injectable 40 milliGRAM(s) SubCutaneous every 24 hours  ketoconazole 2% Shampoo 1 Application(s) Topical once  losartan 100 milliGRAM(s) Oral daily  pantoprazole    Tablet 40 milliGRAM(s) Oral before breakfast    MEDICATIONS  (PRN):  acetaminophen     Tablet .. 650 milliGRAM(s) Oral every 6 hours PRN Mild Pain (1 - 3)  melatonin 6 milliGRAM(s) Oral at bedtime PRN Insomnia  polyethylene glycol 3350 17 Gram(s) Oral daily PRN Constipation  senna 2 Tablet(s) Oral at bedtime PRN Constipation      RECENT LABS:                          15.1   6.14  )-----------( 254      ( 07 Oct 2024 06:40 )             46.5     10-07    141  |  103  |  16  ----------------------------<  116[H]  3.9   |  29  |  0.94    Ca    9.3      07 Oct 2024 06:40    TPro  7.9  /  Alb  4.1  /  TBili  0.6  /  DBili  x   /  AST  39  /  ALT  93[H]  /  AlkPhos  91  10-07    LIVER FUNCTIONS - ( 07 Oct 2024 06:40 )  Alb: 4.1 g/dL / Pro: 7.9 g/dL / ALK PHOS: 91 U/L / ALT: 93 U/L / AST: 39 U/L / GGT: x             Urinalysis Basic - ( 07 Oct 2024 06:40 )    Color: x / Appearance: x / SG: x / pH: x  Gluc: 116 mg/dL / Ketone: x  / Bili: x / Urobili: x   Blood: x / Protein: x / Nitrite: x   Leuk Esterase: x / RBC: x / WBC x   Sq Epi: x / Non Sq Epi: x / Bacteria: x        CAPILLARY BLOOD GLUCOSE

## 2024-10-08 NOTE — PROGRESS NOTE ADULT - ASSESSMENT
MICA LE is a 68 y/o male RHD with a PMH of HTN, HLD who presents to [facility] on 9/19/24 with acute onset gait difficulties, left side weakness, slurred speech, intermittent dizziness, and diplopia secondary to R pontine and L callosal infarct. Hospital course significant for elevated LFTs/ cholelithiasis (to be managed as outpt).    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   - S/P ILR for afib surveillance. DSD daily  - Aspirin 81mg daily  - Atorvastatin 40mg HS- LFTs improving with reduction  - continue comprehensive rehab program, 3 hours a day, 5 days a week. OT PT SLP  - recreation therapy and psychology consult  - Precautions: cardiac, fall, vision, ILR    # HTN  - Losartan 100mg daily  - BP (117/80 - 138/82) 10/7    # HLD  - Decreased to Atorvastatin 40mg HS 9/26    # cholelithiasis  # transaminitis   - CT C/A/P (9/23)- Cholelithiasis with nonspecific thickening of the gallbladder wall. Correlate with LFTs.  - AST  104[H]  /  ALT  177[H]  /  AlkPhos  102  09-26. ->  AST  47[H]  /  ALT  102[H]  /  AlkPhos  96  10-03 improved  - avoid hepatotoxic meds  - decreased atorvastatin  - DC tylenol.   - CMP 10/10  - outpatient GI follow up    # Sleep:   - Melatonin 6mg PRN    # Pain Management:  - Tylenol stopped    # GI/Bowel:  - Senna QHS, Miralax PRN Daily  - GI ppx: Pantoprazole 40mg    # Skin/Pressure Injury:   - Skin assessment on admission: loop recorder site c/d/i  - Itching scalp, resistant dandruff. s/p Ketoconazole shampoo x 3.     # Diet  - Regular- DASH    #  DVT ppx:  -  Lovenox 40mg daily    # Case discussed in IDT rounds 10/2 (follow up)  - skin intact, continent B/B, eating and grooming set up, CG toileting, bathing min assist, UB supervision, LB CG, toilet and shower transfers CG-min assist, independent bed mobility, CG transfers, ambulation 115 feet QC and CG, 15 steps with 1 HR and min assist. Would recommend NBQC on dc  - barriers: LUE weakness, visual perceptual deficits, reduced balance, coordination, dizziness  - goals: ambulate to commode with supervision (progressing), perform toileting tasks with set up (progressing)  - target: supervision with iADLs, set up toileting and dressing, supervision transfers, supervision ambulation. 10/10 dc home [facility] caregiver training     # LABS   CBC CMP 10/10    ---------------  Code Status: FULL  Emergency Contact: SON: EDILMA [phone]    PCP Dr Marcela Lynn [phone] (office) and [phone] [address]    Outpatient Follow-up (Specialty/Name of physician):    Libman, Richard Benjamin  Neurology  [address], [address]  [place] [address]  Phone: [phone]  Fax: [phone]  Follow Up Time: 2 weeks    Edin Neves  Cardiology  [address] [facility] [facility], [address]  [place] [address]  Phone: [phone]  Fax: [phone]  Follow Up Time: 2 weeks    Felice Hazel  Cardiac Electrophysiology  [address] [place] [address], [id number]  [place] [address]  Phone: [phone]  Fax: [phone]  Follow Up Time: 2 weeks    Felice Herrera  Gastroenterology  [address] [place] [address]  Phone: [phone]  Fax: [phone]  Follow Up Time: 2 weeks    Primary Care Dr. Lynn  [phone]  Send all DC paper work to Dr. Lynn MICA LE is a 70 y/o male RHD with a PMH of HTN, HLD who presents to [facility] on 9/19/24 with acute onset gait difficulties, left side weakness, slurred speech, intermittent dizziness, and diplopia secondary to R pontine and L callosal infarct. Hospital course significant for elevated LFTs/ cholelithiasis (to be managed as outpt).    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   - S/P ILR for afib surveillance. DSD daily  - Aspirin 81mg daily  - Atorvastatin 40mg HS- LFTs improving with reduction  - continue comprehensive rehab program, 3 hours a day, 5 days a week. OT PT SLP  - recreation therapy and psychology consult  - Precautions: cardiac, fall, vision, ILR    # HTN  - Losartan 100mg daily  - BP (117/80 - 138/82) 10/7    # HLD  - Decreased to Atorvastatin 40mg HS 9/26    # cholelithiasis  # transaminitis   - CT C/A/P (9/23)- Cholelithiasis with nonspecific thickening of the gallbladder wall.  - AST  104[H]  /  ALT  177[H]  /  AlkPhos  102  09-26. ->  AST  47[H]  /  ALT  102[H]  /  AlkPhos  96 10/3 --> AST  39  /  ALT  93[H]  /  AlkPhos  91  10-07 improved  - avoid hepatotoxic meds  - DC tylenol. decreased atorvastatin  - CMP 10/10  - outpatient GI follow up    # Sleep:   - Melatonin 6mg PRN    # Pain Management:  - Tylenol stopped    # GI/Bowel:  - Senna QHS, Miralax PRN Daily  - GI ppx: Pantoprazole 40mg    # Skin/Pressure Injury:   - Skin assessment on admission: loop recorder site c/d/i  - Itching scalp, resistant dandruff. s/p Ketoconazole shampoo x 3.     # Diet  - Regular- DASH    #  DVT ppx:  -  Lovenox 40mg daily    # Case discussed in IDT rounds 10/2 (follow up)  - skin intact, continent B/B, eating and grooming set up, CG toileting, bathing min assist, UB supervision, LB CG, toilet and shower transfers CG-min assist, independent bed mobility, CG transfers, ambulation 115 feet QC and CG, 15 steps with 1 HR and min assist. Would recommend NBQC on dc  - barriers: LUE weakness, visual perceptual deficits, reduced balance, coordination, dizziness  - goals: ambulate to commode with supervision (progressing), perform toileting tasks with set up (progressing)  - target: supervision with iADLs, set up toileting and dressing, supervision transfers, supervision ambulation. 10/10 dc home wt caregiver training   - Pharmacy: [address] [phone] (retail per wife)    # LABS   CBC CMP 10/10    ---------------  Code Status: FULL  Emergency Contact: SON: EDILMA [phone]    PCP Dr Marcela Lynn [phone] (office) and [phone]  [address] [place] [address]    Outpatient Follow-up (Specialty/Name of physician):    Libman, Richard Benjamin  Neurology  [address] [facility], [address]  [place] [address]  Phone: [phone]  Fax: [phone]  Follow Up Time: 2 weeks    Edin Neves  Cardiology  [address] [facility], [address]  [place] [address]  Phone: [phone]  Fax: [phone]  Follow Up Time: 2 weeks    Felice Hazel  Cardiac Electrophysiology  [address] [place] [address], [id number]  [place] [address]  Phone: [phone]  Fax: [phone]  Follow Up Time: 2 weeks    Felice Herrera  Gastroenterology  [address] [place] [address]  Phone: [phone]  Fax: [phone]  Follow Up Time: 2 weeks    Primary Care Dr. Lynn  [phone]  Send all DC paper work to Dr. Lynn MICA LE is a 70 y/o male RHD with a PMH of HTN, HLD who presents to [facility] on 9/19/24 with acute onset gait difficulties, left side weakness, slurred speech, intermittent dizziness, and diplopia secondary to R pontine and L callosal infarct. Hospital course significant for elevated LFTs/ cholelithiasis (to be managed as outpt).    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   - S/P ILR for afib surveillance. DSD daily  - Aspirin 81mg daily  - Atorvastatin 40mg HS- LFTs improving with reduction  - continue comprehensive rehab program, 3 hours a day, 5 days a week. OT PT SLP  - recreation therapy and psychology consult  - Precautions: cardiac, fall, vision, ILR    # HTN  - Losartan 100mg daily  - BP (117/80 - 138/82) 10/7    # HLD  - Decreased to Atorvastatin 40mg HS 9/26    # cholelithiasis  # transaminitis   - CT C/A/P (9/23)- Cholelithiasis with nonspecific thickening of the gallbladder wall.  - AST  104[H]  /  ALT  177[H]  /  AlkPhos  102  09-26. ->  AST  47[H]  /  ALT  102[H]  /  AlkPhos  96 10/3 --> AST  39  /  ALT  93[H]  /  AlkPhos  91  10-07 improved  - avoid hepatotoxic meds  - DC tylenol. decreased atorvastatin  - CMP 10/10  - outpatient GI follow up    # Sleep:   - Melatonin 6mg PRN    # Pain Management:  - Tylenol stopped    # GI/Bowel:  - Senna QHS, Miralax PRN Daily  - GI ppx: Pantoprazole 40mg    # Skin/Pressure Injury:   - Skin assessment on admission: loop recorder site c/d/i  - Itching scalp, resistant dandruff. s/p Ketoconazole shampoo x 3.     # Diet  - Regular- DASH    #  DVT ppx:  -  Lovenox 40mg daily    # Case discussed in IDT rounds 10/2 (follow up)  - skin intact, continent B/B, eating and grooming set up, CG toileting, bathing min assist, UB supervision, LB CG, toilet and shower transfers CG-min assist, independent bed mobility, CG transfers, ambulation 115 feet QC and CG, 15 steps with 1 HR and min assist. Would recommend NBQC on dc  - barriers: LUE weakness, visual perceptual deficits, reduced balance, coordination, dizziness  - goals: ambulate to commode with supervision (progressing), perform toileting tasks with set up (progressing)  - target: supervision with iADLs, set up toileting and dressing, supervision transfers, supervision ambulation. 10/10 dc home wt caregiver training   - Pharmacy: [address] [phone] (retail per wife)    # LABS   CBC CMP 10/10    addendum 3:14 PM  Patient requests flu shot. Influenza vaccine 0.5 ordered    ---------------  Code Status: FULL  Emergency Contact: SON: EDILMA [phone]    PCP Dr Marcela Lynn [phone] (office) and [phone]  [address] [place] [address]    Outpatient Follow-up (Specialty/Name of physician):    Libman, Richard Benjamin  Neurology  [address] [facility] [facility], [address]  [place] [address]  Phone: [phone]  Fax: [phone]  Follow Up Time: 2 weeks    Edin Neves  Cardiology  [address] [facility], [address]  [place] [address]  Phone: [phone]  Fax: [phone]  Follow Up Time: 2 weeks    Felice Hazel  Cardiac Electrophysiology  [address] [facility], [id number]  [place] [address]  Phone: [phone]  Fax: [phone]  Follow Up Time: 2 weeks    Felice Herrera  Gastroenterology  [address] [place] [address]  Phone: [phone]  Fax: [phone]  Follow Up Time: 2 weeks    Primary Care Dr. Lynn  [phone]  Send all DC paper work to Dr. Lynn

## 2024-10-09 DIAGNOSIS — I63.50 CEREBRAL INFARCTION DUE TO UNSPECIFIED OCCLUSION OR STENOSIS OF UNSPECIFIED CEREBRAL ARTERY: ICD-10-CM

## 2024-10-09 DIAGNOSIS — G81.94 HEMIPLEGIA, UNSPECIFIED AFFECTING LEFT NONDOMINANT SIDE: ICD-10-CM

## 2024-10-09 DIAGNOSIS — Z74.09 OTHER REDUCED MOBILITY: ICD-10-CM

## 2024-10-09 DIAGNOSIS — R27.8 OTHER LACK OF COORDINATION: ICD-10-CM

## 2024-10-09 PROCEDURE — 99232 SBSQ HOSP IP/OBS MODERATE 35: CPT

## 2024-10-09 RX ADMIN — LOSARTAN POTASSIUM 100 MILLIGRAM(S): 100 TABLET, FILM COATED ORAL at 05:16

## 2024-10-09 RX ADMIN — ENOXAPARIN SODIUM 40 MILLIGRAM(S): 150 INJECTION SUBCUTANEOUS at 11:29

## 2024-10-09 RX ADMIN — Medication 81 MILLIGRAM(S): at 11:29

## 2024-10-09 RX ADMIN — ATORVASTATIN CALCIUM 40 MILLIGRAM(S): 10 TABLET, FILM COATED ORAL at 21:02

## 2024-10-09 RX ADMIN — PANTOPRAZOLE SODIUM 40 MILLIGRAM(S): 40 TABLET, DELAYED RELEASE ORAL at 05:16

## 2024-10-09 NOTE — PROGRESS NOTE ADULT - CONSTITUTIONAL COMMENTS
mood seems improved. confusion re: new NP team member otherwise O x 3-4
alert, joking, in much better spirits. Has some new left hand finger movement
alert, mood improved, NAD. Patient and family would like outpatient instead of home therapy
Seen with Mandarin language line  Camille #[id number]

## 2024-10-09 NOTE — PROGRESS NOTE ADULT - SUBJECTIVE AND OBJECTIVE BOX
Patient is a 69y old  Male who presents with a chief complaint of R pontine and L callosal infarct (08 Oct 2024 11:45)      HPI:  68 y/o male RH-dominant, with a PMH of HTN, HLD who presents to [facility] on 9/19/24 with acute onset gait difficulties, left side weakness, slurred speech, intermittent dizziness, and diplopia. LKN is 0700 am 9/18. 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 TTE showed normal LV function. Patient was not a candidate for TNK as he was outside of appropriate window, nor mechanical thrombectomy as there was no LVO. Aspirin and high dose statin was initiated.    Hospital course significant for elevated LFTs, CT C/A/P showed cholelithiasis, patient is recommended to f/u with outpatient GI as he is asymptomatic.     Patient optimized and was evaluated by PM&R and therapy for functional deficits, gait/ADL impairments and acute rehabilitation was recommended. Patient was cleared for discharge to [facility] IRF on 9/23/24. (23 Sep 2024 15:20)      PAST MEDICAL & SURGICAL HISTORY:  Hypertriglyceridemia      HTN (hypertension)      Fracture  olecrenon process      S/P inguinal hernia repair  bilateral          MEDICATIONS  (STANDING):  aspirin  chewable 81 milliGRAM(s) Oral daily  atorvastatin 40 milliGRAM(s) Oral at bedtime  bacitracin   Ointment 1 Application(s) Topical daily  enoxaparin Injectable 40 milliGRAM(s) SubCutaneous every 24 hours  influenza  Vaccine (HIGH DOSE) 0.5 milliLiter(s) IntraMuscular once  ketoconazole 2% Shampoo 1 Application(s) Topical once  losartan 100 milliGRAM(s) Oral daily  pantoprazole    Tablet 40 milliGRAM(s) Oral before breakfast    MEDICATIONS  (PRN):  acetaminophen     Tablet .. 650 milliGRAM(s) Oral every 6 hours PRN Mild Pain (1 - 3)  melatonin 6 milliGRAM(s) Oral at bedtime PRN Insomnia  polyethylene glycol 3350 17 Gram(s) Oral daily PRN Constipation  senna 2 Tablet(s) Oral at bedtime PRN Constipation      Allergies    grass (Sneezing)  No Known Drug Allergies  dust (Sneezing)    Intolerances          VITALS  69y  Vital Signs Last 24 Hrs  T(C): 36.5 (09 Oct 2024 08:40), Max: 36.6 (08 Oct 2024 19:47)  T(F): 97.7 (09 Oct 2024 08:40), Max: 97.9 (08 Oct 2024 19:47)  HR: 64 (09 Oct 2024 08:40) (60 - 65)  BP: 128/74 (09 Oct 2024 08:40) (112/64 - 129/70)  BP(mean): --  RR: 16 (09 Oct 2024 08:40) (16 - 16)  SpO2: 96% (09 Oct 2024 08:40) (96% - 98%)    Parameters below as of 09 Oct 2024 08:40  Patient On (Oxygen Delivery Method): room air      Daily     Daily         RECENT LABS:                      CAPILLARY BLOOD GLUCOSE

## 2024-10-09 NOTE — PROGRESS NOTE ADULT - ASSESSMENT
70 y/o M with PMH of HTN, HLD who presents to [facility] on 9/19/24 with acute onset gait difficulties, left side weakness, slurred speech, intermittent dizziness, and diplopia secondary to R pontine and L callosal infarct. Hospital course significant for elevated LFTs/ cholelithiasis (to be managed as outpt).    #R pontine and L callosal infarct with left side weakness, slurred speech, intermittent dizziness, and diplopia.  -TTE (9/20)- normal LV function  -S/P ILR for afib surveillance  -Aspirin 81mg daily  -Atorvastatin dose decreased to 40mg HS due to increasing LFT's    #Transaminitis  -H/o cholelithiasis. Asymptomatic and stable  -Continue lipitor 40mg qhs  -CT C/A/P (9/23)- Cholelithiasis with nonspecific thickening of the gallbladder wall  -Avoid hepatotoxic meds  -Outpatient GI follow up    #HTN  -Continue Losartan 100mg daily    #HLD  -Continue Atorvastatin 40mg qhs    DVT ppx - Lovenox  GI ppx - PPI

## 2024-10-09 NOTE — PROGRESS NOTE ADULT - TIME BILLING
MEDICAL COMPLEXITY
Time spent includes direct patient care  (interview and examination of patient), discussion with other providers, support staff and/or patient's family members, review of medical records, ordering diagnostic tests and analyzing results, and documentation.
MEDICAL COMPLEXITY
Time spent includes direct patient care  (interview and examination of patient), discussion with other providers, support staff and/or patient's family members, review of medical records, ordering diagnostic tests and analyzing results, and documentation.

## 2024-10-09 NOTE — PROGRESS NOTE ADULT - ASSESSMENT
MICA LE is a 70 y/o male RHD with a PMH of HTN, HLD who presents to [facility] on 9/19/24 with acute onset gait difficulties, left side weakness, slurred speech, intermittent dizziness, and diplopia secondary to R pontine and L callosal infarct. Hospital course significant for elevated LFTs/ cholelithiasis    # R pontine and L callosal infarct with left side weakness, slurred speech, intermittent dizziness, and diplopia.  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   - S/P ILR   - Aspirin 81mg daily  - Atorvastatin 40mg HS- LFTs improving with reduction  - continue comprehensive rehab program, 3 hours a day, 5 days a week. OT PT SLP  - recreation therapy and psychology consult  - Precautions: cardiac, fall, vision, ILR    # HTN  - Losartan 100mg daily  - BP (112/64 - 129/70) 10/9    # HLD  - Decreased to Atorvastatin 40mg HS 9/26    # cholelithiasis  # transaminitis   - CT C/A/P (9/23)- Cholelithiasis with nonspecific thickening of the gallbladder wall.  - AST  104[H]  /  ALT  177[H]  /  AlkPhos  102  09-26. -> AST  39  /  ALT  93[H]  /  AlkPhos  91  10-07 improved  - avoid hepatotoxic meds  - DC tylenol. decreased atorvastatin  - outpatient GI follow up    # Sleep:   - Melatonin 6mg PRN    # Pain Management:  - Tylenol stopped    # GI/Bowel:  - Senna QHS, Miralax PRN Daily  - GI ppx: Pantoprazole 40mg    # Skin  - Itching scalp, resistant dandruff. s/p Ketoconazole shampoo x 3.     # Diet  - Regular- DASH    #  DVT ppx:  -  Lovenox 40mg daily    # Case discussed in IDT rounds 10/9 (follow up)  -     - barriers: LUE weakness, visual perceptual deficits, reduced balance, coordination, dizziness  - goals: ambulate to commode with supervision (progressing), perform toileting tasks with set up (progressing)  - target: supervision with iADLs, set up toileting and dressing, supervision transfers, supervision ambulation. 10/10 [place] home [facility] caregiver training   - Pharmacy: [facility] [address] [place] [address] [phone] (retail per wife). Meds sent 10/8    # Patient requests flu shot. Influenza vaccine 0.5 ordered 10/8    ---------------  Code Status: FULL  Emergency Contact: SON: EDILMA [phone]    PCP Dr Marcela Lynn [phone] (office) and [phone]  [address] [place] [address] Erwin Gaona    Outpatient Follow-up (Specialty/Name of physician):    Libman, Richard Benjamin  Neurology  [address] [facility], [address]  [place] [address]  Phone: [phone]  Fax: [phone]  Follow Up Time: 2 weeks    Edin Neves  Cardiology  [address] [facility], [address]  [place] [address]  Phone: [phone]  Fax: [phone]  Follow Up Time: 2 weeks    Felice Hazel  Cardiac Electrophysiology  [address] [place] [address], [address]  [place] [address]  Phone: [phone]  Fax: [phone]  Follow Up Time: 2 weeks    Felice Herrera  Gastroenterology  [address] [place] [address]  Phone: [phone]  Fax: [phone]  Follow Up Time: 2 weeks    Primary Care Dr. Lynn  [phone]  Send all DC paper work to Dr. Lynn MICA LE is a 68 y/o male RHD with a PMH of HTN, HLD who presents to [facility] on 9/19/24 with acute onset gait difficulties, left side weakness, slurred speech, intermittent dizziness, and diplopia secondary to R pontine and L callosal infarct. Hospital course significant for elevated LFTs/ cholelithiasis    # R pontine and L callosal infarct with left side weakness, slurred speech, intermittent dizziness, and diplopia.  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   - S/P ILR   - Aspirin 81mg daily  - Atorvastatin 40mg HS- LFTs improving with reduction  - continue comprehensive rehab program, 3 hours a day, 5 days a week. OT PT SLP  - recreation therapy and psychology consult  - Precautions: cardiac, fall, vision, ILR    # HTN  - Losartan 100mg daily  - BP (112/64 - 129/70) 10/9    # HLD  - Decreased to Atorvastatin 40mg HS 9/26    # cholelithiasis  # transaminitis   - CT C/A/P (9/23)- Cholelithiasis with nonspecific thickening of the gallbladder wall.  - AST  104[H]  /  ALT  177[H]  /  AlkPhos  102  09-26. -> AST  39  /  ALT  93[H]  /  AlkPhos  91  10-07 improved  - avoid hepatotoxic meds  - DC tylenol. decreased atorvastatin  - outpatient GI follow up    # Sleep:   - Melatonin 6mg PRN    # Pain Management:  - Tylenol stopped    # GI/Bowel:  - Senna QHS, Miralax PRN Daily  - GI ppx: Pantoprazole 40mg    # Skin  - Itching scalp, resistant dandruff. s/p Ketoconazole shampoo x 3.     # Diet  - Regular- DASH    #  DVT ppx:  -  Lovenox 40mg daily    # Case discussed in IDT rounds 10/9 (follow up)  - continent B/B, family training completed today, CS/CG bADLS and transfers, mod ind bed mobility, CG incidental transfers and negotiating 16 steps, supervision/CG ambulation   - barriers: LUE weakness, visual perceptual deficits, reduced balance, coordination, dizziness  - goals: ambulate to commode with supervision (progressing), perform toileting tasks with set up (progressing)  - target: supervision with iADLs, set up toileting and dressing, supervision transfers, supervision ambulation. 10/10 dc home with caregiver training. Patient requesting OUTpatient PT OT  - Pharmacy: [facility] [address] [place] [address] [phone] (retail per wife). Meds sent 10/8, DME delivered today 10/9    # Patient requests flu shot. Influenza vaccine 0.5 ordered 10/8    ---------------  Code Status: FULL  Emergency Contact: SON: EDILMA [phone]    PCP Dr Marcela Lynn [phone] (office) and [phone] [facility] Hospital[facility]oyle[facility]win [facility]    Outpatient Follow-up (Specialty/Name of physician):    Libman, Richard Benjamin  Neurology  [address] [facility] [address]  [place] [address]  Phone: [phone]  Fax: [phone]  Follow Up Time: 2 weeks    Edin Neves  Cardiology  [address] [facility], [address]  [place] [address]  Phone: [phone]  Fax: [phone]  Follow Up Time: 2 weeks    Felice Hazel  Cardiac Electrophysiology  [address] [place] [address], [address]  [place] [address]  Phone: [phone]  Fax: [phone]  Follow Up Time: 2 weeks    Felice Herrera  Gastroenterology  [address] [place] [address]  Phone: [phone]  Fax: [phone]  Follow Up Time: 2 weeks    Primary Care Dr. Lynn  [phone]  Send all DC paper work to Dr. Lynn MICA LE is a 70 y/o male RHD with a PMH of HTN, HLD who presents to [facility] on 9/19/24 with acute onset gait difficulties, left side weakness, slurred speech, intermittent dizziness, and diplopia secondary to R pontine and L callosal infarct. Hospital course significant for elevated LFTs/ cholelithiasis    # R pontine and L callosal infarct with left side weakness, slurred speech, intermittent dizziness, and diplopia.  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   - S/P ILR   - Aspirin 81mg daily  - Atorvastatin 40mg HS- LFTs improving with reduction  - continue comprehensive rehab program, 3 hours a day, 5 days a week. OT PT SLP  - recreation therapy and psychology consult  - Precautions: cardiac, fall, vision, ILR    # HTN  - Losartan 100mg daily  - BP (112/64 - 129/70) 10/9    # HLD  - Decreased to Atorvastatin 40mg HS 9/26    # cholelithiasis  # transaminitis   - CT C/A/P (9/23)- Cholelithiasis with nonspecific thickening of the gallbladder wall.  - AST  104[H]  /  ALT  177[H]  /  AlkPhos  102  09-26. -> AST  39  /  ALT  93[H]  /  AlkPhos  91  10-07 improved  - avoid hepatotoxic meds  - DC tylenol. decreased atorvastatin  - outpatient GI follow up    # Sleep:   - Melatonin 6mg PRN    # Pain Management:  - Tylenol stopped    # GI/Bowel:  - Senna QHS, Miralax PRN Daily  - GI ppx: Pantoprazole 40mg    # Skin  - Itching scalp, resistant dandruff. s/p Ketoconazole shampoo x 3.     # Diet  - Regular- DASH    #  DVT ppx:  -  Lovenox 40mg daily    # Case discussed in IDT rounds 10/9 (follow up)  - continent B/B, family training completed today, CS/CG bADLS and transfers, mod ind bed mobility, CG incidental transfers and negotiating 16 steps, supervision/CG ambulation   - barriers: LUE weakness, visual perceptual deficits, reduced balance, coordination, dizziness  - goals: ambulate to commode with supervision (progressing), perform toileting tasks with set up (progressing)  - target: supervision with iADLs, set up toileting and dressing, supervision transfers, supervision ambulation. 10/10 dc home with caregiver training. Patient requesting OUTpatient PT OT  - Pharmacy: [address] [phone] (retail per wife). Meds sent 10/8, DME delivered today 10/9  - Outpatient PT and OT orders placed in [employer]    # Patient requests flu shot. Influenza vaccine 0.5 ordered 10/8    ---------------  Code Status: FULL  Emergency Contact: SON: EDILMA [phone]    PCP Dr Marcela Lynn [phone] (office) and [phone]  [address] [place] [address]    Outpatient Follow-up (Specialty/Name of physician):    Libman, Richard Benjamin  Neurology  [address] [facility], [address]  [place] [address]  Phone: [phone]  Fax: [phone]  Follow Up Time: 2 weeks    Edin Neves  Cardiology  [address] [facility], [address]  [place] [address]  Phone: [phone]  Fax: [phone]  Follow Up Time: 2 weeks    Fleice Hazel  Cardiac Electrophysiology  [address] [place] [address], [address]  [place] [address]  Phone: [phone]  Fax: [phone]  Follow Up Time: 2 weeks    Felice Herrera  Gastroenterology  [address] [place] [address]  Phone: [phone]  Fax: [phone]  Follow Up Time: 2 weeks    Primary Care Dr. Lynn  [phone]  Send all DC paper work to Dr. Lynn

## 2024-10-09 NOTE — PROGRESS NOTE ADULT - SUBJECTIVE AND OBJECTIVE BOX
CC: Patient is a 69y old  Male who presents with a chief complaint of R pontine and L callosal infarct (09 Oct 2024 09:10)      Interval History:  Patient seen and examined at bedside.  No overnight events  No complaints this morning  Denies CP, SOB, abd pain, N/V, fever, chills  Rarely has intermittent dizziness    ALLERGIES:  grass (Sneezing)  No Known Drug Allergies  dust (Sneezing)    MEDICATIONS  (STANDING):  aspirin  chewable 81 milliGRAM(s) Oral daily  atorvastatin 40 milliGRAM(s) Oral at bedtime  bacitracin   Ointment 1 Application(s) Topical daily  enoxaparin Injectable 40 milliGRAM(s) SubCutaneous every 24 hours  influenza  Vaccine (HIGH DOSE) 0.5 milliLiter(s) IntraMuscular once  ketoconazole 2% Shampoo 1 Application(s) Topical once  losartan 100 milliGRAM(s) Oral daily  pantoprazole    Tablet 40 milliGRAM(s) Oral before breakfast    MEDICATIONS  (PRN):  acetaminophen     Tablet .. 650 milliGRAM(s) Oral every 6 hours PRN Mild Pain (1 - 3)  melatonin 6 milliGRAM(s) Oral at bedtime PRN Insomnia  polyethylene glycol 3350 17 Gram(s) Oral daily PRN Constipation  senna 2 Tablet(s) Oral at bedtime PRN Constipation    Vital Signs Last 24 Hrs  T(F): 97.7 (09 Oct 2024 08:40), Max: 97.9 (08 Oct 2024 19:47)  HR: 64 (09 Oct 2024 08:40) (60 - 65)  BP: 128/74 (09 Oct 2024 08:40) (112/64 - 129/70)  RR: 16 (09 Oct 2024 08:40) (16 - 16)  SpO2: 96% (09 Oct 2024 08:40) (96% - 98%)  I&O's Summary        PHYSICAL EXAM:  GENERAL: NAD  HEAD:  Atraumatic, Normocephalic  NECK: Supple, No JVD  CHEST/LUNG: Clear to auscultation bilaterally; No wheeze, nonlabored breathing  HEART: Regular rate and rhythm; No murmurs, rubs, or gallops  ABDOMEN: Soft, Nontender, Nondistended; Bowel sounds present  EXTREMITIES:  No clubbing, cyanosis, or edema  PSYCH: calm, appropriate mood    LABS:                        15.1   6.14  )-----------( 254      ( 07 Oct 2024 06:40 )             46.5       10-07    141  |  103  |  16  ----------------------------<  116  3.9   |  29  |  0.94    Ca    9.3      07 Oct 2024 06:40    TPro  7.9  /  Alb  4.1  /  TBili  0.6  /  DBili  x   /  AST  39  /  ALT  93  /  AlkPhos  91  10-07                09-20 Chol 175 mg/dL LDL -- HDL 33 mg/dL Trig 240 mg/dL                  Urinalysis Basic - ( 07 Oct 2024 06:40 )    Color: x / Appearance: x / SG: x / pH: x  Gluc: 116 mg/dL / Ketone: x  / Bili: x / Urobili: x   Blood: x / Protein: x / Nitrite: x   Leuk Esterase: x / RBC: x / WBC x   Sq Epi: x / Non Sq Epi: x / Bacteria: x        COVID-19 PCR: [place] (09-23-24 @ 19:52)      Care Discussed with Consultants/Other Providers: Yes, Rehab provider

## 2024-10-09 NOTE — PROGRESS NOTE ADULT - COMMENTS
Patient seen with PT ambulating in hallway. He denies H/A, difficulty sleeping. Daily bowel movements, no urinary complaints. 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 He is grossly O x 3
Patient seen with Mandsravanthi Osei #[id number]. Wife also at bedside, seen with OT    Patient looks more optimistic and when asked how therapy was going reports "good" He denies headache or difficulty sleeping. His scalp itching has improved, but not resolved; he asks about cream, received 2 doses ketoconazole shampoo. will give one more dose, he and family agreeable.    NO b/B complaints
Patient seen in OT, performing ball exercises/catch while seated edge of mat, for balance/trunk control, and gross and finer motor coordination. He still primarily uses right UE for activity, with lag in left, but does try to incorporate into activity. Denies H/a; discussed with patient and wife that medications were sent and ready for pickup, in line for dc tomorrow    Will get flu shot today

## 2024-10-10 ENCOUNTER — TRANSCRIPTION ENCOUNTER (OUTPATIENT)
Age: 69
End: 2024-10-10

## 2024-10-10 VITALS
OXYGEN SATURATION: 95 % | HEART RATE: 65 BPM | DIASTOLIC BLOOD PRESSURE: 80 MMHG | TEMPERATURE: 98 F | RESPIRATION RATE: 16 BRPM | SYSTOLIC BLOOD PRESSURE: 122 MMHG

## 2024-10-10 PROCEDURE — 99239 HOSP IP/OBS DSCHRG MGMT >30: CPT

## 2024-10-10 RX ADMIN — LOSARTAN POTASSIUM 100 MILLIGRAM(S): 100 TABLET, FILM COATED ORAL at 05:05

## 2024-10-10 RX ADMIN — PANTOPRAZOLE SODIUM 40 MILLIGRAM(S): 40 TABLET, DELAYED RELEASE ORAL at 05:05

## 2024-10-10 RX ADMIN — Medication 81 MILLIGRAM(S): at 10:31

## 2024-10-10 RX ADMIN — INFLUENZA VIRUS VACCINE 0.5 MILLILITER(S): 15; 15; 15; 15 SUSPENSION INTRAMUSCULAR at 07:55

## 2024-10-10 NOTE — DISCHARGE NOTE NURSING/CASE MANAGEMENT/SOCIAL WORK - NSDCFUADDAPPT_GEN_ALL_CORE_FT
Please take provided scripts to outpatient therapies.     Libman, Richard Benjamin  Neurology  [address] [facility], [address]  [place] [address]  Phone: [phone]  Fax: [phone]  Follow Up Time: 2 weeks    Edin Neves  Cardiology  [address] [facility], [address]  [place] [address]  Phone: [phone]  Fax: [phone]  Follow Up Time: 2 weeks    Felice Hazel  Cardiac Electrophysiology  [address] [place] [address], [address]  [place] [address]  Phone: [phone]  Fax: [phone]  Follow Up Time: 2 weeks    Felice Herrera  Gastroenterology  [address] [place] [address]  Phone: [phone]  Fax: [phone]  Follow Up Time: 2 weeks    Dr. Marcela Lynn, Primary Care

## 2024-10-10 NOTE — DISCHARGE NOTE NURSING/CASE MANAGEMENT/SOCIAL WORK - NSDCVIVACCINE_GEN_ALL_CORE_FT
Influenza, high-dose, trivalent, preservative free; 10-Oct-2024 07:55; Aaron Alba (JAMAL); [employer]; [id number] (Exp. Date: 10-Hugh-2025); IntraMuscular; Deltoid Right.; 0.5 milliLiter(s); VIS (VIS Published: 06-Aug-2021, VIS Presented: 10-Oct-2024);

## 2024-10-10 NOTE — DISCHARGE NOTE NURSING/CASE MANAGEMENT/SOCIAL WORK - PATIENT PORTAL LINK FT
You can access the FollowMyHealth Patient Portal offered by [facility] by registering at the following website: [email or web address]. By joining [facility]’s FollowMyHealth portal, you will also be able to view your health information using other applications (apps) compatible with our system.

## 2024-10-10 NOTE — PROGRESS NOTE ADULT - PROVIDER SPECIALTY LIST ADULT
Hospitalist
Hospitalist
Physiatry
Hospitalist
Hospitalist
Physiatry
Physiatry
Hospitalist
Hospitalist
Neuropsychology
Hospitalist
Hospitalist
Physiatry
Rehab Medicine
Hospitalist
Physiatry
Physiatry
Rehab Medicine
Hospitalist
Hospitalist
Physiatry

## 2024-10-10 NOTE — PROGRESS NOTE ADULT - NEUROLOGICAL COMMENTS
L arm weakness
L arm weakness
improving weakness
L arm weakness improving
L arm weakness
L weakness
L arm weakness improving
L arm weakness
L arm weakness

## 2024-10-10 NOTE — PROGRESS NOTE ADULT - MOTOR
left shoulder abduction 3+ (to 105 degrees actively)  elbow flexion 3-/5 wrist extension 3-/5 finger flexion 3/5  ambulates with HHA in PT, for hip stabilization/trunk control. No scissoring gait noted, reduced left LE foot placement at timese but responds well to correction
L arm weakness
L arm weakness improving
LEFT    UE - ShAB 3/5, EF 3/5, EE 3+/5, WE 2/5,  3/5  RIGHT UE - ShAB 5/5, EF 5/5, EE 5/5, WE 5/5,  5/5  LEFT    LE - HF 4/5, KE 4/5, DF 2/5, PF 3/5   RIGHT LE - HF 5/5, KE 5/5, DF 5/5, PF 5/5
L sided weakness continues to improve.
Continually improving weakness.
L Arm weakness improving more in proximal muscles.
LEFT    UE - ShAB 3/5, EF 3/5, EE 3+/5, WE 3/5,  3/5   RIGHT UE - ShAB 5/5, EF 5/5, EE 5/5, WE 5/5,  5/5  LEFT    LE - HF 4/5, KE 4/5, DF 2/5, PF 3/5  RIGHT LE - HF 5/5, KE 5/5, DF 5/5, PF 5/5
left shoulder 3+/5 elbow flexion 4-/5 gross grasp 4-/5 pincer 3/5  calves soft no TTP
L arm weakness
left shoulder abduction 4-/5 elbow flexion 4-/5 wrist extension 2-3/5 finger flexion/tapping individual fingers 3-/5, reduced gross and fine motor coordination although gross motor coordination improved  no calf swelling masood SHARMA
L arm weakness
L arm weakness improving

## 2024-10-10 NOTE — DISCHARGE NOTE NURSING/CASE MANAGEMENT/SOCIAL WORK - NSDCPEFALRISK_GEN_ALL_CORE
For information on Fall & Injury Prevention, visit: [email or web address] OR  [email or web address] OR  https://www.cdc.gov/steadi/patient.html

## 2024-10-10 NOTE — PROGRESS NOTE ADULT - SUBJECTIVE AND OBJECTIVE BOX
Patient is a 69y old  Male who presents with a chief complaint of R pontine and L callosal infarct (09 Oct 2024 14:53)    HPI:  68 y/o male RH-dominant, with a PMH of HTN, HLD who presents to [facility] on 9/19/24 with acute onset gait difficulties, left side weakness, slurred speech, intermittent dizziness, and diplopia. LKN is 0700 am 9/18. 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 TTE showed normal LV function. Patient was not a candidate for TNK as he was outside of appropriate window, nor mechanical thrombectomy as there was no LVO. Aspirin and high dose statin was initiated.    Hospital course significant for elevated LFTs, CT C/A/P showed cholelithiasis, patient is recommended to f/u with outpatient GI as he is asymptomatic.     Patient optimized and was evaluated by PM&R and therapy for functional deficits, gait/ADL impairments and acute rehabilitation was recommended. Patient was cleared for discharge to [facility] IRF on 9/23/24. (23 Sep 2024 15:20)      SUBJECTIVE  Interval History:  Patient Seen and examined at bedside this morning. Scheduled to DC later today. No new complaints, No overnight events.     Allergies    grass (Sneezing)  No Known Drug Allergies  dust (Sneezing)    Intolerances        OBJECTIVE    69y  Vital Signs Last 24 Hrs  T(C): 36.6 (10 Oct 2024 07:45), Max: 36.7 (09 Oct 2024 20:16)  T(F): 97.9 (10 Oct 2024 07:45), Max: 98 (09 Oct 2024 20:16)  HR: 65 (10 Oct 2024 07:45) (62 - 66)  BP: 122/80 (10 Oct 2024 07:45) (122/80 - 130/77)  BP(mean): --  RR: 16 (10 Oct 2024 07:45) (16 - 16)  SpO2: 95% (10 Oct 2024 07:45) (95% - 96%)    Parameters below as of 10 Oct 2024 07:45  Patient On (Oxygen Delivery Method): room air      Daily     Daily       MEDICATIONS  (STANDING):  aspirin  chewable 81 milliGRAM(s) Oral daily  atorvastatin 40 milliGRAM(s) Oral at bedtime  bacitracin   Ointment 1 Application(s) Topical daily  enoxaparin Injectable 40 milliGRAM(s) SubCutaneous every 24 hours  ketoconazole 2% Shampoo 1 Application(s) Topical once  losartan 100 milliGRAM(s) Oral daily  pantoprazole    Tablet 40 milliGRAM(s) Oral before breakfast    MEDICATIONS  (PRN):  acetaminophen     Tablet .. 650 milliGRAM(s) Oral every 6 hours PRN Mild Pain (1 - 3)  melatonin 6 milliGRAM(s) Oral at bedtime PRN Insomnia  polyethylene glycol 3350 17 Gram(s) Oral daily PRN Constipation  senna 2 Tablet(s) Oral at bedtime PRN Constipation      RECENT LABS:                    CAPILLARY BLOOD GLUCOSE               Patient is a 69y old  Male who presents with a chief complaint of R pontine and L callosal infarct (09 Oct 2024 14:53)    HPI:  70 y/o male RH-dominant, with a PMH of HTN, HLD who presents to [facility] on 9/19/24 with acute onset gait difficulties, left side weakness, slurred speech, intermittent dizziness, and diplopia. LKN is 0700 am 9/18. 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 TTE showed normal LV function. Patient was not a candidate for TNK as he was outside of appropriate window, nor mechanical thrombectomy as there was no LVO. Aspirin and high dose statin was initiated.    Hospital course significant for elevated LFTs, CT C/A/P showed cholelithiasis, patient is recommended to f/u with outpatient GI as he is asymptomatic.     Patient optimized and was evaluated by PM&R and therapy for functional deficits, gait/ADL impairments and acute rehabilitation was recommended. Patient was cleared for discharge to [facility] IRF on 9/23/24. (23 Sep 2024 15:20)      SUBJECTIVE  Interval History:  Patient Seen and examined at bedside this morning. Scheduled to DC later today. No new complaints, No overnight events.     Allergies    grass (Sneezing)  No Known Drug Allergies  dust (Sneezing)    Intolerances        OBJECTIVE    69y  Vital Signs Last 24 Hrs  T(C): 36.6 (10 Oct 2024 07:45), Max: 36.7 (09 Oct 2024 20:16)  T(F): 97.9 (10 Oct 2024 07:45), Max: 98 (09 Oct 2024 20:16)  HR: 65 (10 Oct 2024 07:45) (62 - 66)  BP: 122/80 (10 Oct 2024 07:45) (122/80 - 130/77)  BP(mean): --  RR: 16 (10 Oct 2024 07:45) (16 - 16)  SpO2: 95% (10 Oct 2024 07:45) (95% - 96%)    Parameters below as of 10 Oct 2024 07:45  Patient On (Oxygen Delivery Method): room air      Daily     Daily       MEDICATIONS  (STANDING):  aspirin  chewable 81 milliGRAM(s) Oral daily  atorvastatin 40 milliGRAM(s) Oral at bedtime  bacitracin   Ointment 1 Application(s) Topical daily  enoxaparin Injectable 40 milliGRAM(s) SubCutaneous every 24 hours  ketoconazole 2% Shampoo 1 Application(s) Topical once  losartan 100 milliGRAM(s) Oral daily  pantoprazole    Tablet 40 milliGRAM(s) Oral before breakfast    MEDICATIONS  (PRN):  acetaminophen     Tablet .. 650 milliGRAM(s) Oral every 6 hours PRN Mild Pain (1 - 3)  melatonin 6 milliGRAM(s) Oral at bedtime PRN Insomnia  polyethylene glycol 3350 17 Gram(s) Oral daily PRN Constipation  senna 2 Tablet(s) Oral at bedtime PRN Constipation      RECENT LABS:                    CAPILLARY BLOOD GLUCOSE

## 2024-10-10 NOTE — PROGRESS NOTE ADULT - ATTENDING COMMENTS
Progress note amended to include my discussions with patient, resident, hospitalist, RN, SW, PT and my findings    Patient seen with wife and use of Mandarin language line  #[id number] [place]    Patient doing well, excited about going home "waiting for you to go". Denies any H/A. We reviewed BEFAST and importance of seeking medical attention if he develops neurological change/potential signs of stroke, as well as importance of compliance with medications and medical follow up including neuro , PCP and cardiology. He asks questions re: his mouth'; has left facial weakness, but also had issues where his jaw felt locked and needed his hands/physical assist to lift lower jaw in place. This pre-existed stroke; possible TMJ and dental follow up discussed, and reviewed changes in chewing/swallowing/speaking as other things to look at with facial weakness as potential stroke signs    Received flu vaccination today. Patient has opted for outpatient therapy instead of home, Rx placed in [employer].    Patient is medically cleared for dc home today, patient and family agreeable. Time spent for review medical follow up, patient education, review vitals, dc and coordination care 45 min        RECENT LABS    Vital Signs Last 24 Hrs  T(C): 36.6 (10 Oct 2024 07:45), Max: 36.7 (09 Oct 2024 20:16)  T(F): 97.9 (10 Oct 2024 07:45), Max: 98 (09 Oct 2024 20:16)  HR: 65 (10 Oct 2024 07:45) (62 - 66)  BP: 122/80 (10 Oct 2024 07:45) (122/80 - 130/77)  BP(mean): --  RR: 16 (10 Oct 2024 07:45) (16 - 16)  SpO2: 95% (10 Oct 2024 07:45) (95% - 96%)    Parameters below as of 10 Oct 2024 07:45  Patient On (Oxygen Delivery Method): room air Is This A New Presentation, Or A Follow-Up?: Acne

## 2024-10-10 NOTE — PROGRESS NOTE ADULT - CONSTITUTIONAL
normal/well-groomed/no distress

## 2024-10-10 NOTE — PROGRESS NOTE ADULT - ASSESSMENT
MICA LE is a 70 y/o male RHD with a PMH of HTN, HLD who presents to [facility] on 9/19/24 with acute onset gait difficulties, left side weakness, slurred speech, intermittent dizziness, and diplopia secondary to R pontine and L callosal infarct. Hospital course significant for elevated LFTs/ cholelithiasis    # R pontine and L callosal infarct with left side weakness, slurred speech, intermittent dizziness, and diplopia.  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   - S/P ILR   - Aspirin 81mg daily  - Atorvastatin 40mg HS- LFTs improving with reduction  - continue comprehensive rehab program, 3 hours a day, 5 days a week. OT PT SLP  - recreation therapy and psychology consult  - Precautions: cardiac, fall, vision, ILR    # HTN  - Losartan 100mg daily  - BP (122/80 - 130/77) 10/10    # HLD  - Decreased to Atorvastatin 40mg HS 9/26    # cholelithiasis  # transaminitis   - CT C/A/P (9/23)- Cholelithiasis with nonspecific thickening of the gallbladder wall.  - AST  104[H]  /  ALT  177[H]  /  AlkPhos  102  09-26. -> AST  39  /  ALT  93[H]  /  AlkPhos  91  10-07 improved  - avoid hepatotoxic meds  - DC tylenol. decreased atorvastatin  - outpatient GI follow up    # Sleep:   - Melatonin 6mg PRN    # Pain Management:  - Tylenol stopped    # GI/Bowel:  - Senna QHS, Miralax PRN Daily  - GI ppx: Pantoprazole 40mg    # Skin  - Itching scalp, resistant dandruff. s/p Ketoconazole shampoo x 3.     # Diet  - Regular- DASH    #  DVT ppx:  -  Lovenox 40mg daily    # Case discussed in IDT rounds 10/9 (follow up)  - continent B/B, family training completed today, CS/CG bADLS and transfers, mod ind bed mobility, CG incidental transfers and negotiating 16 steps, supervision/CG ambulation   - barriers: LUE weakness, visual perceptual deficits, reduced balance, coordination, dizziness  - goals: ambulate to commode with supervision (progressing), perform toileting tasks with set up (progressing)  - target: supervision with iADLs, set up toileting and dressing, supervision transfers, supervision ambulation. 10/10 dc home with caregiver training. Patient requesting OUTpatient PT OT  - [facility] [phone] (retail per wife). Meds sent 10/8, DME delivered 10/9  - Outpatient PT and OT orders placed in [employer]    # Patient requests flu shot. Influenza vaccine 0.5 ordered 10/8    ---------------  Code Status: FULL  Emergency Contact: SON: EDILMA [phone]    PCP Dr Marcela Lynn [phone] (office) and [phone]  [address] [place] [address]    Outpatient Follow-up (Specialty/Name of physician):    Libman, Richard Benjamin  Neurology  [address] [facility], [address]  [place] [address]  Phone: [phone]  Fax: [phone]  Follow Up Time: 2 weeks    Edin Neves  Cardiology  [address] [facility], [address]  [place] [address]  Phone: [phone]  Fax: [phone]  Follow Up Time: 2 weeks    Felice Hazel  Cardiac Electrophysiology  [address] [place] [address], [address]  [place] [address]  Phone: [phone]  Fax: [phone]  Follow Up Time: 2 weeks    Felice Herrera  Gastroenterology  [address] [place] [address]  Phone: [phone]  Fax: [phone]  Follow Up Time: 2 weeks    Primary Care Dr. Lynn  [phone]  Send all DC paper work to Dr. Lynn - PHI form sent to Dr. Lucero office 10/10  MICA LE is a 70 y/o male RHD with a PMH of HTN, HLD who presents to [facility] on 9/19/24 with acute onset gait difficulties, left side weakness, slurred speech, intermittent dizziness, and diplopia secondary to R pontine and L callosal infarct. Hospital course significant for elevated LFTs/ cholelithiasis    # R pontine and L callosal infarct with left side weakness, slurred speech, intermittent dizziness, and diplopia.  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   - S/P ILR   - Aspirin 81mg daily and Atorvastatin 40mg HS secondary stroke prevention, reviewed with patient and wife  - outpatient PT and OT  - PCP, cardiology, neurology, PMR f/u on dc    # HTN  - Losartan 100mg daily  - BP (122/80 - 130/77) 10/10  - PCP f/u on dc    # HLD  - Atorvastatin 40mg HS 9/26    # cholelithiasis  # transaminitis   - CT C/A/P (9/23)- Cholelithiasis with nonspecific thickening of the gallbladder wall.  -  AST  39  /  ALT  93[H]  /  AlkPhos  91  10-07 improved  - avoid hepatotoxic meds  - DC tylenol. decreased atorvastatin  - outpatient GI follow up    # Sleep:   - Melatonin 6mg PRN    # Pain Management:  - Tylenol stopped    # GI/Bowel:  - Senna QHS, Miralax PRN Daily  - GI ppx: Pantoprazole 40mg    # Skin  - Itching scalp, resistant dandruff. s/p Ketoconazole shampoo x 3.     # Diet  - Regular- DASH    #  DVT ppx:  -  Lovenox 40mg daily    # s/p. Influenza vaccine 0.5 10/10    ---------------  Code Status: FULL  Emergency Contact: SON: EDILMA [phone]    PCP Dr Marcela Lynn [phone] (office) and [phone] [address]    Outpatient Follow-up (Specialty/Name of physician):    Libman, Richard Benjamin  Neurology  [address] [place] [address], [address]  [place] [address]  Phone: [phone]  Fax: [phone]  Follow Up Time: 2 weeks    Edin Neves  Cardiology  [address] [facility], [address]  [place] [address]  Phone: [phone]  Fax: [phone]  Follow Up Time: 2 weeks    Felice Hazel  Cardiac Electrophysiology  [address] [place] [address], [address]  [place] [address]  Phone: [phone]  Fax: [phone]  Follow Up Time: 2 weeks    Felice Herrera  Gastroenterology  [address] [place] [address]  Phone: [phone]  Fax: [phone]  Follow Up Time: 2 weeks    Primary Care Dr. Lynn  [phone]  Send all DC paper work to Dr. Lynn - PHI form sent to Dr. Lynns office 10/10

## 2024-10-10 NOTE — PROGRESS NOTE ADULT - REASON FOR ADMISSION
R pontine and L callosal infarct

## 2024-10-29 ENCOUNTER — APPOINTMENT (OUTPATIENT)
Dept: NEUROLOGY | Facility: CLINIC | Age: 69
End: 2024-10-29
Payer: COMMERCIAL

## 2024-10-29 VITALS
WEIGHT: 148 LBS | BODY MASS INDEX: 24.66 KG/M2 | DIASTOLIC BLOOD PRESSURE: 91 MMHG | HEIGHT: 65 IN | SYSTOLIC BLOOD PRESSURE: 154 MMHG

## 2024-10-29 DIAGNOSIS — G90.09 OTHER IDIOPATHIC PERIPHERAL AUTONOMIC NEUROPATHY: ICD-10-CM

## 2024-10-29 LAB
24R-OH-CALCIDIOL SERPL-MCNC: 79.2 PG/ML
ESTIMATED AVERAGE GLUCOSE: 126 MG/DL
FOLATE SERPL-MCNC: 11.5 NG/ML
HBA1C MFR BLD HPLC: 6 %
TSH SERPL-ACNC: 0.58 UIU/ML
VIT B12 SERPL-MCNC: 766 PG/ML

## 2024-10-29 PROCEDURE — 93880 EXTRACRANIAL BILAT STUDY: CPT

## 2024-10-29 PROCEDURE — 93892 TCD EMBOLI DETECT W/O INJ: CPT

## 2024-10-29 PROCEDURE — 99205 OFFICE O/P NEW HI 60 MIN: CPT

## 2024-10-29 PROCEDURE — 93886 INTRACRANIAL COMPLETE STUDY: CPT

## 2024-10-30 LAB
ALBUMIN MFR SERPL ELPH: 61.9 %
ALBUMIN SERPL-MCNC: 4.5 G/DL
ALBUMIN/GLOB SERPL: 1.7 RATIO
ALPHA1 GLOB MFR SERPL ELPH: 3.5 %
ALPHA1 GLOB SERPL ELPH-MCNC: 0.3 G/DL
ALPHA2 GLOB MFR SERPL ELPH: 9.3 %
ALPHA2 GLOB SERPL ELPH-MCNC: 0.7 G/DL
B-GLOBULIN MFR SERPL ELPH: 11.2 %
B-GLOBULIN SERPL ELPH-MCNC: 0.8 G/DL
GAMMA GLOB FLD ELPH-MCNC: 1 G/DL
GAMMA GLOB MFR SERPL ELPH: 14.1 %
INTERPRETATION SERPL IEP-IMP: NORMAL
PROT SERPL-MCNC: 7.2 G/DL
PROT SERPL-MCNC: 7.2 G/DL

## 2024-11-01 LAB — B BURGDOR DNA SPEC QL NAA+PROBE: NEGATIVE

## 2024-11-02 LAB — VIT B1 SERPL-MCNC: 121.6 NMOL/L

## 2024-11-15 PROCEDURE — 97530 THERAPEUTIC ACTIVITIES: CPT | Mod: GO

## 2024-11-15 PROCEDURE — 87635 SARS-COV-2 COVID-19 AMP PRB: CPT

## 2024-11-15 PROCEDURE — 85025 COMPLETE CBC W/AUTO DIFF WBC: CPT

## 2024-11-15 PROCEDURE — 80053 COMPREHEN METABOLIC PANEL: CPT

## 2024-11-15 PROCEDURE — 97112 NEUROMUSCULAR REEDUCATION: CPT | Mod: GP

## 2024-11-15 PROCEDURE — 97110 THERAPEUTIC EXERCISES: CPT | Mod: GP

## 2024-11-15 PROCEDURE — 97165 OT EVAL LOW COMPLEX 30 MIN: CPT | Mod: GO

## 2024-11-15 PROCEDURE — 97535 SELF CARE MNGMENT TRAINING: CPT | Mod: GO

## 2024-11-15 PROCEDURE — 36415 COLL VENOUS BLD VENIPUNCTURE: CPT

## 2024-11-15 PROCEDURE — 92507 TX SP LANG VOICE COMM INDIV: CPT | Mod: GN

## 2024-11-15 PROCEDURE — 80076 HEPATIC FUNCTION PANEL: CPT

## 2024-11-15 PROCEDURE — 97116 GAIT TRAINING THERAPY: CPT | Mod: GP

## 2024-11-15 PROCEDURE — 92523 SPEECH SOUND LANG COMPREHEN: CPT | Mod: GN

## 2024-11-15 PROCEDURE — 92610 EVALUATE SWALLOWING FUNCTION: CPT | Mod: GN

## 2024-11-15 PROCEDURE — 97161 PT EVAL LOW COMPLEX 20 MIN: CPT | Mod: GP

## 2024-11-15 PROCEDURE — 90662 IIV NO PRSV INCREASED AG IM: CPT

## 2025-01-15 ENCOUNTER — APPOINTMENT (OUTPATIENT)
Dept: NEUROLOGY | Facility: CLINIC | Age: 70
End: 2025-01-15
Payer: COMMERCIAL

## 2025-01-15 VITALS
WEIGHT: 148 LBS | HEART RATE: 80 BPM | BODY MASS INDEX: 24.63 KG/M2 | DIASTOLIC BLOOD PRESSURE: 84 MMHG | SYSTOLIC BLOOD PRESSURE: 164 MMHG

## 2025-01-15 DIAGNOSIS — I63.50 CEREBRAL INFARCTION DUE TO UNSPECIFIED OCCLUSION OR STENOSIS OF UNSPECIFIED CEREBRAL ARTERY: ICD-10-CM

## 2025-01-15 DIAGNOSIS — G81.94 HEMIPLEGIA, UNSPECIFIED AFFECTING LEFT NONDOMINANT SIDE: ICD-10-CM

## 2025-01-15 DIAGNOSIS — G47.00 INSOMNIA, UNSPECIFIED: ICD-10-CM

## 2025-01-15 PROCEDURE — 99203 OFFICE O/P NEW LOW 30 MIN: CPT

## 2025-01-29 ENCOUNTER — APPOINTMENT (OUTPATIENT)
Dept: NEUROLOGY | Facility: CLINIC | Age: 70
End: 2025-01-29